# Patient Record
Sex: MALE | Race: WHITE | Employment: OTHER | URBAN - METROPOLITAN AREA
[De-identification: names, ages, dates, MRNs, and addresses within clinical notes are randomized per-mention and may not be internally consistent; named-entity substitution may affect disease eponyms.]

---

## 2017-02-06 PROBLEM — I10 ESSENTIAL HYPERTENSION: Status: ACTIVE | Noted: 2017-02-06

## 2017-06-06 PROBLEM — E78.01 FAMILIAL HYPERCHOLESTEROLEMIA: Status: ACTIVE | Noted: 2017-06-06

## 2017-09-21 PROBLEM — E78.01 FAMILIAL HYPERCHOLESTEROLEMIA: Status: RESOLVED | Noted: 2017-06-06 | Resolved: 2017-09-21

## 2018-05-26 ENCOUNTER — APPOINTMENT (OUTPATIENT)
Dept: GENERAL RADIOLOGY | Age: 78
End: 2018-05-26
Attending: EMERGENCY MEDICINE
Payer: MEDICARE

## 2018-05-26 ENCOUNTER — HOSPITAL ENCOUNTER (EMERGENCY)
Age: 78
Discharge: HOME | End: 2018-05-27
Attending: EMERGENCY MEDICINE
Payer: MEDICARE

## 2018-05-26 VITALS
RESPIRATION RATE: 22 BRPM | OXYGEN SATURATION: 92 % | WEIGHT: 270 LBS | SYSTOLIC BLOOD PRESSURE: 158 MMHG | HEIGHT: 72 IN | HEART RATE: 93 BPM | DIASTOLIC BLOOD PRESSURE: 73 MMHG | TEMPERATURE: 99 F | BODY MASS INDEX: 36.57 KG/M2

## 2018-05-26 DIAGNOSIS — S80.12XA TRAUMATIC HEMATOMA OF LEFT LOWER LEG, INITIAL ENCOUNTER: ICD-10-CM

## 2018-05-26 DIAGNOSIS — L03.116 CELLULITIS OF LEFT LOWER LEG: Primary | ICD-10-CM

## 2018-05-26 LAB
ALBUMIN SERPL-MCNC: 2.8 G/DL (ref 3.2–4.6)
ALBUMIN/GLOB SERPL: 0.6 {RATIO} (ref 1.2–3.5)
ALP SERPL-CCNC: 60 U/L (ref 50–136)
ALT SERPL-CCNC: 19 U/L (ref 12–65)
ANION GAP SERPL CALC-SCNC: 4 MMOL/L (ref 7–16)
AST SERPL-CCNC: 44 U/L (ref 15–37)
BASOPHILS # BLD: 0 K/UL (ref 0–0.2)
BASOPHILS NFR BLD: 0 % (ref 0–2)
BILIRUB SERPL-MCNC: 0.9 MG/DL (ref 0.2–1.1)
BUN SERPL-MCNC: 25 MG/DL (ref 8–23)
CALCIUM SERPL-MCNC: 8.2 MG/DL (ref 8.3–10.4)
CHLORIDE SERPL-SCNC: 105 MMOL/L (ref 98–107)
CO2 SERPL-SCNC: 29 MMOL/L (ref 21–32)
CREAT SERPL-MCNC: 0.92 MG/DL (ref 0.8–1.5)
DIFFERENTIAL METHOD BLD: ABNORMAL
EOSINOPHIL # BLD: 0.1 K/UL (ref 0–0.8)
EOSINOPHIL NFR BLD: 1 % (ref 0.5–7.8)
ERYTHROCYTE [DISTWIDTH] IN BLOOD BY AUTOMATED COUNT: 14.2 % (ref 11.9–14.6)
GLOBULIN SER CALC-MCNC: 4.4 G/DL (ref 2.3–3.5)
GLUCOSE SERPL-MCNC: 100 MG/DL (ref 65–100)
HCT VFR BLD AUTO: 36.4 % (ref 41.1–50.3)
HGB BLD-MCNC: 11.8 G/DL (ref 13.6–17.2)
IMM GRANULOCYTES # BLD: 0 K/UL (ref 0–0.5)
IMM GRANULOCYTES NFR BLD AUTO: 0 % (ref 0–5)
INR PPP: 4.2
LACTATE BLD-SCNC: 0.8 MMOL/L (ref 0.5–1.9)
LYMPHOCYTES # BLD: 1.3 K/UL (ref 0.5–4.6)
LYMPHOCYTES NFR BLD: 13 % (ref 13–44)
MCH RBC QN AUTO: 30.8 PG (ref 26.1–32.9)
MCHC RBC AUTO-ENTMCNC: 32.4 G/DL (ref 31.4–35)
MCV RBC AUTO: 95 FL (ref 79.6–97.8)
MONOCYTES # BLD: 1.5 K/UL (ref 0.1–1.3)
MONOCYTES NFR BLD: 15 % (ref 4–12)
NEUTS SEG # BLD: 7.2 K/UL (ref 1.7–8.2)
NEUTS SEG NFR BLD: 71 % (ref 43–78)
PLATELET # BLD AUTO: 195 K/UL (ref 150–450)
PMV BLD AUTO: 10.2 FL (ref 10.8–14.1)
POTASSIUM SERPL-SCNC: 5.2 MMOL/L (ref 3.5–5.1)
PROT SERPL-MCNC: 7.2 G/DL (ref 6.3–8.2)
PROTHROMBIN TIME: 40.4 SEC (ref 11.5–14.5)
RBC # BLD AUTO: 3.83 M/UL (ref 4.23–5.67)
SODIUM SERPL-SCNC: 138 MMOL/L (ref 136–145)
WBC # BLD AUTO: 10.2 K/UL (ref 4.3–11.1)

## 2018-05-26 PROCEDURE — 83605 ASSAY OF LACTIC ACID: CPT

## 2018-05-26 PROCEDURE — 85610 PROTHROMBIN TIME: CPT | Performed by: EMERGENCY MEDICINE

## 2018-05-26 PROCEDURE — 85025 COMPLETE CBC W/AUTO DIFF WBC: CPT | Performed by: EMERGENCY MEDICINE

## 2018-05-26 PROCEDURE — 80053 COMPREHEN METABOLIC PANEL: CPT | Performed by: EMERGENCY MEDICINE

## 2018-05-26 PROCEDURE — 74011250637 HC RX REV CODE- 250/637: Performed by: EMERGENCY MEDICINE

## 2018-05-26 PROCEDURE — 96375 TX/PRO/DX INJ NEW DRUG ADDON: CPT | Performed by: EMERGENCY MEDICINE

## 2018-05-26 PROCEDURE — 96374 THER/PROPH/DIAG INJ IV PUSH: CPT | Performed by: EMERGENCY MEDICINE

## 2018-05-26 PROCEDURE — 73590 X-RAY EXAM OF LOWER LEG: CPT

## 2018-05-26 PROCEDURE — 99284 EMERGENCY DEPT VISIT MOD MDM: CPT | Performed by: EMERGENCY MEDICINE

## 2018-05-26 PROCEDURE — 74011250636 HC RX REV CODE- 250/636: Performed by: EMERGENCY MEDICINE

## 2018-05-26 RX ORDER — CEPHALEXIN 500 MG/1
500 CAPSULE ORAL 4 TIMES DAILY
Qty: 28 CAP | Refills: 0 | Status: SHIPPED | OUTPATIENT
Start: 2018-05-26 | End: 2018-06-02

## 2018-05-26 RX ORDER — HYDROMORPHONE HYDROCHLORIDE 1 MG/ML
0.5 INJECTION, SOLUTION INTRAMUSCULAR; INTRAVENOUS; SUBCUTANEOUS
Status: DISCONTINUED | OUTPATIENT
Start: 2018-05-26 | End: 2018-05-26 | Stop reason: SDUPTHER

## 2018-05-26 RX ORDER — ONDANSETRON 2 MG/ML
4 INJECTION INTRAMUSCULAR; INTRAVENOUS
Status: COMPLETED | OUTPATIENT
Start: 2018-05-26 | End: 2018-05-26

## 2018-05-26 RX ORDER — CEFAZOLIN SODIUM/WATER 2 G/20 ML
2 SYRINGE (ML) INTRAVENOUS
Status: COMPLETED | OUTPATIENT
Start: 2018-05-26 | End: 2018-05-26

## 2018-05-26 RX ORDER — HYDROMORPHONE HYDROCHLORIDE 1 MG/ML
0.5 INJECTION, SOLUTION INTRAMUSCULAR; INTRAVENOUS; SUBCUTANEOUS
Status: COMPLETED | OUTPATIENT
Start: 2018-05-26 | End: 2018-05-26

## 2018-05-26 RX ORDER — HYDROMORPHONE HYDROCHLORIDE 1 MG/ML
1 INJECTION, SOLUTION INTRAMUSCULAR; INTRAVENOUS; SUBCUTANEOUS
Status: DISCONTINUED | OUTPATIENT
Start: 2018-05-26 | End: 2018-05-26

## 2018-05-26 RX ORDER — ACETAMINOPHEN 325 MG/1
650 TABLET ORAL
Status: COMPLETED | OUTPATIENT
Start: 2018-05-26 | End: 2018-05-26

## 2018-05-26 RX ADMIN — ACETAMINOPHEN 650 MG: 325 TABLET ORAL at 22:43

## 2018-05-26 RX ADMIN — ONDANSETRON 4 MG: 2 INJECTION INTRAMUSCULAR; INTRAVENOUS at 22:43

## 2018-05-26 RX ADMIN — Medication 2 G: at 22:43

## 2018-05-26 RX ADMIN — HYDROMORPHONE HYDROCHLORIDE 0.5 MG: 1 INJECTION, SOLUTION INTRAMUSCULAR; INTRAVENOUS; SUBCUTANEOUS at 22:43

## 2018-05-27 NOTE — DISCHARGE INSTRUCTIONS
Cellulitis: Care Instructions  Your Care Instructions    Cellulitis is a skin infection. It often occurs after a break in the skin from a scrape, cut, bite, or puncture, or after a rash. The doctor has checked you carefully, but problems can develop later. If you notice any problems or new symptoms, get medical treatment right away. Follow-up care is a key part of your treatment and safety. Be sure to make and go to all appointments, and call your doctor if you are having problems. It's also a good idea to know your test results and keep a list of the medicines you take. How can you care for yourself at home? · Take your antibiotics as directed. Do not stop taking them just because you feel better. You need to take the full course of antibiotics. · Prop up the infected area on pillows to reduce pain and swelling. Try to keep the area above the level of your heart as often as you can. · If your doctor told you how to care for your wound, follow your doctor's instructions. If you did not get instructions, follow this general advice:  ¨ Wash the wound with clean water 2 times a day. Don't use hydrogen peroxide or alcohol, which can slow healing. ¨ You may cover the wound with a thin layer of petroleum jelly, such as Vaseline, and a nonstick bandage. ¨ Apply more petroleum jelly and replace the bandage as needed. · Be safe with medicines. Take pain medicines exactly as directed. ¨ If the doctor gave you a prescription medicine for pain, take it as prescribed. ¨ If you are not taking a prescription pain medicine, ask your doctor if you can take an over-the-counter medicine. To prevent cellulitis in the future  · Try to prevent cuts, scrapes, or other injuries to your skin. Cellulitis most often occurs where there is a break in the skin. · If you get a scrape, cut, mild burn, or bite, wash the wound with clean water as soon as you can to help avoid infection.  Don't use hydrogen peroxide or alcohol, which can slow healing. · If you have swelling in your legs (edema), support stockings and good skin care may help prevent leg sores and cellulitis. · Take care of your feet, especially if you have diabetes or other conditions that increase the risk of infection. Wear shoes and socks. Do not go barefoot. If you have athlete's foot or other skin problems on your feet, talk to your doctor about how to treat them. When should you call for help? Call your doctor now or seek immediate medical care if:  ? · You have signs that your infection is getting worse, such as:  ¨ Increased pain, swelling, warmth, or redness. ¨ Red streaks leading from the area. ¨ Pus draining from the area. ¨ A fever. ? · You get a rash. ? Watch closely for changes in your health, and be sure to contact your doctor if:  ? · You are not getting better after 1 day (24 hours). ? · You do not get better as expected. Where can you learn more? Go to http://marlyn-socorro.info/. Miguel Danger in the search box to learn more about \"Cellulitis: Care Instructions. \"  Current as of: October 13, 2016  Content Version: 11.4  © 2838-8230 [employer]. Care instructions adapted under license by [employer] (which disclaims liability or warranty for this information). If you have questions about a medical condition or this instruction, always ask your healthcare professional. [place] [address] any warranty or liability for your use of this information. Bruises: Care Instructions  Your Care Instructions    Bruises occur when small blood vessels under the skin tear or rupture, most often from a twist, bump, or fall. Blood leaks into tissues under the skin and causes a black-and-blue spot that often turns colors, including purplish black, reddish blue, or yellowish green, as the bruise heals. Bruises hurt, but most are not serious and will go away on their own within 2 to 4 weeks.  Sometimes, gravity causes them to spread down the body. A leg bruise usually will take longer to heal than a bruise on the face or arms. Follow-up care is a key part of your treatment and safety. Be sure to make and go to all appointments, and call your doctor if you are having problems. It's also a good idea to know your test results and keep a list of the medicines you take. How can you care for yourself at home? · Take pain medicines exactly as directed. ¨ If the doctor gave you a prescription medicine for pain, take it as prescribed. ¨ If you are not taking a prescription pain medicine, ask your doctor if you can take an over-the-counter medicine. · Put ice or a cold pack on the area for 10 to 20 minutes at a time. Put a thin cloth between the ice and your skin. · If you can, prop up the bruised area on pillows as much as possible for the next few days. Try to keep the bruise above the level of your heart. When should you call for help? Call your doctor now or seek immediate medical care if:  ? · You have signs of infection, such as:  ¨ Increased pain, swelling, warmth, or redness. ¨ Red streaks leading from the bruise. ¨ Pus draining from the bruise. ¨ A fever. ? · You have a bruise on your leg and signs of a blood clot, such as:  ¨ Increasing redness and swelling along with warmth, tenderness, and pain in the bruised area. ¨ Pain in your calf, back of the knee, thigh, or groin. ¨ Redness and swelling in your leg or groin. ? · Your pain gets worse. ? Watch closely for changes in your health, and be sure to contact your doctor if:  ? · You do not get better as expected. Where can you learn more? Go to http://marlyn-socorro.info/. Enter [phone] in the search box to learn more about \"Bruises: Care Instructions. \"  Current as of: March 20, 2017  Content Version: 11.4  © 7648-4642 [employer].  Care instructions adapted under license by [employer] (which disclaims liability or warranty for this information). If you have questions about a medical condition or this instruction, always ask your healthcare professional. [place] [address] any warranty or liability for your use of this information.

## 2018-05-27 NOTE — ED TRIAGE NOTES
Pt c/o left lower leg pain, left lower leg is very red, swollen and hot to touch. There is a wound on his lower left leg that he has a bandage covering.

## 2018-05-27 NOTE — ED TRIAGE NOTES
[facility] brought pt in after fall on Sunday. Pt unable to perform basic ADL's at home without pain. Pt does have bruising on left leg and pt c/o pain to that leg. Pt lives alone and thought he could get by without coming to ER but as the days have gone on since fall pt has had increased pain with movement and standing so he called 911 for them to bring him to ER.

## 2018-05-27 NOTE — ED PROVIDER NOTES
Patient is a 77 y.o. male presenting with fall. The history is provided by the patient. Fall   Incident onset: 6 days ago on Sunday. The fall occurred while walking. He fell from a height of ground level. He landed on hard floor. Point of impact: left knee and lower leg. Pain location: left lower leg. The pain is moderate. He was ambulatory at the scene. There was no entrapment after the fall. There was no drug use involved in the accident. There was no alcohol use involved in the accident. Pertinent negatives include no fever, no numbness, no abdominal pain, no nausea, no vomiting, no hematuria, no headaches, no loss of consciousness and no tingling. The symptoms are aggravated by activity. He has tried nothing for the symptoms.         Past Medical History:   Diagnosis Date    AICD (automatic cardioverter/defibrillator) present 10/27/2015    Aortic stenosis, mild to moderate 10/27/2015    Cardiomyopathy, ischemic 8/17/2012    Carotid occlusion, bilateral 8/20/2012    Chronic back pain 2/12/2015    Chronic rhinitis 10/27/2015    Chronic systolic congestive heart failure ([address] 10/27/2015    NYHA class 2    Coronary artery disease 8/17/2012    DVT, lower extremity, recurrent ([address]     1994 and 2003 - on chronic coumadin    Factor 5 Leiden mutation, heterozygous ([address] 8/17/2012    Heart attack ([address]     \"I had a heart attack the day I was being discharged from my triple by-pass\"    History of alcoholism ([address] quit age 34    History of complete eye exam 10/2016    Hx of smoking quit after 19    Hyperlipidemia 8/17/2012    Hypertension 8/17/2012    NSVT (nonsustained ventricular tachycardia) ([address]     Obesity 8/17/2012    Osteoarthritis 2/12/2015    Pacemaker     Paroxysmal atrial fibrillation ([address] 10/27/2015    Phlebitis and thrombophlebitis 1995, 2004    due to Factor 5 Leiden    S/P total knee arthroplasty 2/26/2015    Tobacco abuse 2/12/2015    Wears dentures        Past Surgical History: Procedure Laterality Date    HX ANGIOPLASTY      rotoblator    HX CATARACT REMOVAL      iop    HX CORONARY ARTERY BYPASS GRAFT  x 3, 2011    in [address]  2011    +Defibrilator placement    HX LIPOMA RESECTION  1998    back    HX LYMPH NODE DISSECTION      Lymph node bx/removal from back    HX ORTHOPAEDIC Left     Knee surgery         Family History:   Problem Relation Age of Onset    Heart Disease Mother       CHF age 71    Other Father      blood clot  age 76    Cancer Sister 68     Pancreatic    Cancer Brother      Skin    Heart Disease Brother      Heart Valve       Social History     Social History    Marital status:      Spouse name: N/A    Number of children: N/A    Years of education: N/A     Occupational History    Not on file. Social History Main Topics    Smoking status: Former Smoker     Packs/day: 1.00     Years: 20.00     Quit date: 1972    Smokeless tobacco: Never Used    Alcohol use No      Comment: quit at the age 34    Drug use: No    Sexual activity: Not on file     Other Topics Concern    Not on file     Social History Narrative    . First wife  , remarried 10/99. Raised in [place]. 11th grade education. [address] [place] [address] Rd. Hobbies include fishing and travel. No children. ALLERGIES: Review of patient's allergies indicates no known allergies. Review of Systems   Constitutional: Negative for fever. Respiratory: Negative for shortness of breath. Cardiovascular: Negative for chest pain. Gastrointestinal: Negative for abdominal pain, nausea and vomiting. Genitourinary: Negative for dysuria, hematuria and urgency. Musculoskeletal: Negative for back pain and neck pain. Skin: Positive for color change and wound. Neurological: Negative for tingling, loss of consciousness, numbness and headaches.        Vitals:    18 2124   BP: (!) 168/117   Pulse: 95 Resp: 22   Temp: 99 °F (37.2 °C)   SpO2: 99%   Weight: 122.5 kg (270 lb)   Height: 6' (1.829 m)            Physical Exam   Constitutional: He appears well-developed and well-nourished. HENT:   Mouth/Throat: Oropharynx is clear and moist.   Eyes: Conjunctivae are normal.   Cardiovascular: Normal rate, regular rhythm and normal heart sounds. Pulmonary/Chest: Effort normal and breath sounds normal.   Abdominal: Soft. He exhibits no distension. There is no tenderness. Musculoskeletal:   Left lower leg has  Diffuse erythema and bruising with some palm sized area of hematoma formation. There is diffuse warmth. Swelling extends up into the left knee and distal thigh area. Left lower extremity is neurovascularly intact. 1+ bilateral dorsalis pedis and posterior tibial pulses are present. Motor and sensation are intact. No abscesses appreciated. Neurological: He is alert. Nursing note and vitals reviewed. MDM  Number of Diagnoses or Management Options  Diagnosis management comments: X-rays negative for fracture. There is no gas in the soft tissues. No evidence of osteomyelitis. INR is elevated. I will have patient hold his Coumadin tomorrow and Monday and follow-up at [facility] cardiology on Tuesday to have it rechecked and then have discussion of what dose to start back on. He usually takes 10 mg every day but Tuesday's on which she takes 5 mg. He does not have diabetes.   Doubt DVT given elevated INR but he does have a history of phlebitis and DVT in the past.       Amount and/or Complexity of Data Reviewed  Clinical lab tests: ordered and reviewed (Results for orders placed or performed during the hospital encounter of 05/26/18  -PROTHROMBIN TIME + INR       Result                                            Value                         Ref Range                       Prothrombin time                                  40.4 (H)                      11.5 - 14.5 sec                 INR 4.2 ()                                                 -CBC WITH AUTOMATED DIFF       Result                                            Value                         Ref Range                       WBC                                               10.2                          4.3 - 11.1 K/uL                 RBC                                               3.83 (L)                      4.23 - 5.67 M/uL                HGB                                               11.8 (L)                      13.6 - 17.2 g/dL                HCT                                               36.4 (L)                      41.1 - 50.3 %                   MCV                                               95.0                          79.6 - 97.8 FL                  MCH                                               30.8                          26.1 - 32.9 PG                  MCHC                                              32.4                          31.4 - 35.0 g/dL                RDW                                               14.2                          11.9 - 14.6 %                   PLATELET                                          195                           150 - 450 K/uL                  MPV                                               10.2 (L)                      10.8 - 14.1 FL                  DF                                                AUTOMATED                                                     NEUTROPHILS                                       71                            43 - 78 %                       LYMPHOCYTES                                       13                            13 - 44 %                       MONOCYTES                                         15 (H)                        4.0 - 12.0 %                    EOSINOPHILS                                       1                             0.5 - 7.8 %                     BASOPHILS 0                             0.0 - 2.0 %                     IMMATURE GRANULOCYTES                             0                             0.0 - 5.0 %                     ABS. NEUTROPHILS                                  7.2                           1.7 - 8.2 K/UL                  ABS. LYMPHOCYTES                                  1.3                           0.5 - 4.6 K/UL                  ABS. MONOCYTES                                    1.5 (H)                       0.1 - 1.3 K/UL                  ABS. EOSINOPHILS                                  0.1                           0.0 - 0.8 K/UL                  ABS. BASOPHILS                                    0.0                           0.0 - 0.2 K/UL                  ABS. IMM.  GRANS.                                  0.0                           0.0 - 0.5 K/UL             -METABOLIC PANEL, COMPREHENSIVE       Result                                            Value                         Ref Range                       Sodium                                            138                           136 - 145 mmol/L                Potassium                                         5.2 (H)                       3.5 - 5.1 mmol/L                Chloride                                          105                           98 - 107 mmol/L                 CO2                                               29                            21 - 32 mmol/L                  Anion gap                                         4 (L)                         7 - 16 mmol/L                   Glucose                                           100                           65 - 100 mg/dL                  BUN                                               25 (H)                        8 - 23 MG/DL                    Creatinine                                        0.92                          0.8 - 1.5 MG/DL                 GFR est AA >60                           >60 ml/min/1.73m2               GFR est non-AA                                    >60                           >60 ml/min/1.73m2               Calcium                                           8.2 (L)                       8.3 - 10.4 MG/DL                Bilirubin, total                                  0.9                           0.2 - 1.1 MG/DL                 ALT (SGPT)                                        19                            12 - 65 U/L                     AST (SGOT)                                        44 (H)                        15 - 37 U/L                     Alk. phosphatase                                  60                            50 - 136 U/L                    Protein, total                                    7.2                           6.3 - 8.2 g/dL                  Albumin                                           2.8 (L)                       3.2 - 4.6 g/dL                  Globulin                                          4.4 (H)                       2.3 - 3.5 g/dL                  A-G Ratio                                         0.6 (L)                       1.2 - 3.5                  -POC LACTIC ACID       Result                                            Value                         Ref Range                       Lactic Acid (POC)                                 0.8                           0.5 - 1.9 mmol/L           )  Tests in the radiology section of CPT®: ordered and reviewed (Xr Tib/fib Lt    Result Date: 5/26/2018  LEFT TIBIA-FIBULA AP AND LATERAL VIEWS HISTORY: Fall with pain swelling and bruising. FINDINGS: A left knee prosthesis is present. Edema is present throughout the soft tissues of the leg. There is no displaced tib-fib fracture. IMPRESSION: No displaced fracture.     )          ED Course       Procedures

## 2018-06-12 PROBLEM — E66.01 SEVERE OBESITY (BMI 35.0-39.9): Status: ACTIVE | Noted: 2018-06-12

## 2018-06-26 ENCOUNTER — HOSPITAL ENCOUNTER (OUTPATIENT)
Dept: WOUND CARE | Age: 78
Discharge: HOME | End: 2018-06-26
Attending: SURGERY
Payer: MEDICARE

## 2018-06-26 PROCEDURE — 87186 SC STD MICRODIL/AGAR DIL: CPT | Performed by: FAMILY MEDICINE

## 2018-06-26 PROCEDURE — 11045 DBRDMT SUBQ TISS EACH ADDL: CPT

## 2018-06-26 PROCEDURE — 99214 OFFICE O/P EST MOD 30 MIN: CPT

## 2018-06-26 PROCEDURE — 87075 CULTR BACTERIA EXCEPT BLOOD: CPT | Performed by: FAMILY MEDICINE

## 2018-06-26 PROCEDURE — 87077 CULTURE AEROBIC IDENTIFY: CPT | Performed by: FAMILY MEDICINE

## 2018-06-26 PROCEDURE — 87205 SMEAR GRAM STAIN: CPT | Performed by: FAMILY MEDICINE

## 2018-06-26 PROCEDURE — 11042 DBRDMT SUBQ TIS 1ST 20SQCM/<: CPT

## 2018-06-27 ENCOUNTER — HOME HEALTH ADMISSION (OUTPATIENT)
Dept: HOME HEALTH SERVICES | Facility: HOME HEALTH | Age: 78
End: 2018-06-27

## 2018-06-29 ENCOUNTER — HOSPITAL ENCOUNTER (OUTPATIENT)
Dept: WOUND CARE | Age: 78
Discharge: HOME | End: 2018-06-29
Attending: SURGERY
Payer: MEDICARE

## 2018-06-29 PROCEDURE — 99214 OFFICE O/P EST MOD 30 MIN: CPT

## 2018-07-03 ENCOUNTER — APPOINTMENT (OUTPATIENT)
Dept: WOUND CARE | Age: 78
End: 2018-07-03
Attending: SURGERY
Payer: MEDICARE

## 2018-07-03 LAB
BACTERIA SPEC CULT: ABNORMAL
GRAM STN SPEC: ABNORMAL
GRAM STN SPEC: ABNORMAL
SERVICE CMNT-IMP: ABNORMAL

## 2018-07-04 LAB
BACTERIA SPEC CULT: NORMAL
SERVICE CMNT-IMP: NORMAL

## 2018-07-06 ENCOUNTER — HOSPITAL ENCOUNTER (OUTPATIENT)
Dept: WOUND CARE | Age: 78
Discharge: HOME | End: 2018-07-06
Attending: SURGERY
Payer: MEDICARE

## 2018-07-06 PROCEDURE — 97597 DBRDMT OPN WND 1ST 20 CM/<: CPT

## 2018-07-06 PROCEDURE — 97598 DBRDMT OPN WND ADDL 20CM/<: CPT

## 2018-07-11 ENCOUNTER — HOSPITAL ENCOUNTER (OUTPATIENT)
Dept: WOUND CARE | Age: 78
Discharge: HOME | End: 2018-07-11
Attending: SURGERY
Payer: MEDICARE

## 2018-07-11 PROCEDURE — 97605 NEG PRS WND THER DME<=50SQCM: CPT

## 2018-07-20 ENCOUNTER — HOSPITAL ENCOUNTER (OUTPATIENT)
Dept: WOUND CARE | Age: 78
Discharge: HOME | End: 2018-07-20
Attending: SURGERY
Payer: MEDICARE

## 2018-07-20 PROCEDURE — 29581 APPL MULTLAYER CMPRN SYS LEG: CPT

## 2018-08-03 ENCOUNTER — HOSPITAL ENCOUNTER (OUTPATIENT)
Dept: WOUND CARE | Age: 78
Discharge: HOME | End: 2018-08-03
Attending: SURGERY
Payer: MEDICARE

## 2018-08-03 PROCEDURE — 29581 APPL MULTLAYER CMPRN SYS LEG: CPT

## 2018-08-17 ENCOUNTER — HOSPITAL ENCOUNTER (OUTPATIENT)
Dept: WOUND CARE | Age: 78
Discharge: HOME | End: 2018-08-17
Attending: SURGERY
Payer: MEDICARE

## 2018-08-17 PROCEDURE — 29581 APPL MULTLAYER CMPRN SYS LEG: CPT

## 2018-08-31 ENCOUNTER — HOSPITAL ENCOUNTER (OUTPATIENT)
Dept: WOUND CARE | Age: 78
Discharge: HOME | End: 2018-08-31
Attending: SURGERY
Payer: MEDICARE

## 2018-08-31 VITALS
HEIGHT: 72 IN | SYSTOLIC BLOOD PRESSURE: 147 MMHG | OXYGEN SATURATION: 93 % | BODY MASS INDEX: 39.9 KG/M2 | DIASTOLIC BLOOD PRESSURE: 79 MMHG | TEMPERATURE: 97.6 F | WEIGHT: 294.6 LBS | HEART RATE: 82 BPM | RESPIRATION RATE: 24 BRPM

## 2018-08-31 PROCEDURE — 77030013575 HC DRSG HYDROFERA HOLL -B

## 2018-08-31 PROCEDURE — 77030019607 HC DSG BURN S&N -A

## 2018-08-31 PROCEDURE — A6021 COLLAGEN DRESSING <=16 SQ IN: HCPCS

## 2018-08-31 PROCEDURE — 29581 APPL MULTLAYER CMPRN SYS LEG: CPT

## 2018-08-31 NOTE — WOUND CARE
[address] [place] [address] [address] Phone: [phone] Fax: [phone] Patient: Ora Elizondo MRN: [id number]  SSN: [id number] YOB: 1940  Age: 77 y.o. Sex: male Return Appointment: 2 weeks with Mohamud Driscoll MD 
 
Instructions: Wound vac to be discontinued. Cleanse wound and periwound with wound cleanser or normal saline. Endoform-Cut to wound size and apply to wound bed. Hydrofera Blue: Cut to wound size, moisten with saline, and apply to wound bed. Place endoform to wound bed and cover with hydrofera blue. Secure with exudry and coban 2 to left leg. Home health to change 2 to 3x weekly Should you experience increased redness, swelling, pain, foul odor, size of wound(s), or have a temperature over 101 degrees please contact the [address] [place] [address] Road at [phone] or if after hours contact your primary care physician or go to the hospital emergency department. Signed By: Clara Short August 31, 2018

## 2018-08-31 NOTE — WOUND CARE
08/31/18 1404 Wound Leg Lower Date First Assessed/Time First Assessed: 08/31/18 0934   POA: Yes  Wound Type: Trauma  Location: Leg Lower  Wound Description (Optional): #1 DRESSING STATUS Clean, dry, and intact DRESSING TYPE (npwt, coban 2) Non-Pressure Injury Full thickness (subcut/muscle) Wound Length (cm) 3.4 cm Wound Width (cm) 4.5 cm Wound Depth (cm) 0.2 Wound Surface area (cm^2) 15.3 cm^2 Condition of Base Granulation;Epithelializing Condition of Edges Open Epithelialization (%) 10 Tissue Type Red Tissue Type Percent Red 90 Drainage Amount  Moderate Drainage Color Serosanguinous Wound Odor None Periwound Skin Condition Intact Cleansing and Cleansing Agents  Normal saline

## 2018-09-05 NOTE — PROGRESS NOTES
Wound Center Progress Note Patient: Cayla Trejo MRN: [id number]  SSN: [id number] YOB: 1940  Age: 77 y.o. Sex: male Subjective: Chief Complaint: 
Left knee wound History of Present Illness:    
Left leg contusion s/p fall Wound Caused By: Contusion, anticoagulation, lagos lesion Associated Signs and Symptoms: Initial drainage, pain Timing: constant wound since June 2 Quality: wound Severity: full thickness Modifying Factors: CHF, LE edema Current Wound Care: wound vac Past Medical History:  
Diagnosis Date  AICD (automatic cardioverter/defibrillator) present 10/27/2015  Aortic stenosis, mild to moderate 10/27/2015  Cardiomyopathy, ischemic 8/17/2012  Carotid occlusion, bilateral 8/20/2012  Chronic back pain 2/12/2015  Chronic rhinitis 10/27/2015  Chronic systolic congestive heart failure ([address] 10/27/2015 NYHA class 2  
 Coronary artery disease 8/17/2012  DVT, lower extremity, recurrent ([address] 1994 and 2003 - on chronic coumadin  Factor 5 Leiden mutation, heterozygous ([address] 8/17/2012  Heart attack ([address] \"I had a heart attack the day I was being discharged from my triple by-pass\"  History of alcoholism ([address] quit age 34  
 History of complete eye exam 10/2016  Hx of smoking quit after 19  Hyperlipidemia 8/17/2012  Hypertension 8/17/2012  NSVT (nonsustained ventricular tachycardia) (HCC)  Obesity 8/17/2012  Osteoarthritis 2/12/2015  Pacemaker  Paroxysmal atrial fibrillation ([address] 10/27/2015  Phlebitis and thrombophlebitis 1995, 2004  
 due to Factor 5 Leiden  S/P total knee arthroplasty 2/26/2015  Tobacco abuse 2/12/2015  Wears dentures Past Surgical History:  
Procedure Laterality Date [address]  HX CATARACT REMOVAL    
 iop  HX CORONARY ARTERY BYPASS GRAFT  x 3, 12/2011  
 in [place]  HX IMPLANTABLE CARDIOVERTER DEFIBRILLATOR  2011 +Defibrilator placement [address]  HX LYMPH NODE DISSECTION Lymph node bx/removal from back  HX ORTHOPAEDIC Left Knee surgery Family History Problem Relation Age of Onset  Heart Disease Mother   
   CHF age 71  
 Other Father   
  blood clot  age 76  Cancer Sister 68 Pancreatic  Cancer Brother Skin  Heart Disease Brother Heart Valve Social History Substance Use Topics  Smoking status: Former Smoker Packs/day: 1.00 Years: 20.00 Quit date: 1972  Smokeless tobacco: Never Used  Alcohol use No  
   Comment: quit at the age 34 Prior to Admission medications Medication Sig Start Date End Date Taking? Authorizing Provider HYDROcodone-acetaminophen (NORCO)  mg tablet Take one tablet by mouth every 4 to 6 hours prn for pain. 8/15/18   Myrna Duque MD  
warfarin (COUMADIN) 5 mg tablet TAKE 2 TABLET BY MOUTH MONDAY AND FRIDAY AND 1 1/2 TABLET BY MOUTH THE OTHER DAYS Patient taking differently: 10mg on Sun,MOn ,Wed,Thurs,Fri,SAt, 5mg on 18   Myrna Duqeu MD  
carvedilol (COREG) 25 mg tablet TAKE 1 TABLET BY MOUTH TWICE DAILY WITH MEALS 18   Myrna Duque MD  
lisinopril (PRINIVIL, ZESTRIL) 20 mg tablet TAKE 1 TABLET BY MOUTH TWICE DAILY FOR HIGH BLOOD PRESSURE 18   Myrna Duque MD  
furosemide (LASIX) 40 mg tablet TAKE 1 TABLET BY MOUTH DAILY 10/25/17   Myrna Duque MD  
amLODIPine (NORVASC) 10 mg tablet TAKE 1 TABLET BY MOUTH ONCE DAILY 17   Myrna Duque MD  
pravastatin (PRAVACHOL) 40 mg tablet TAKE 1 TABLET BY MOUTH EVERY EVENING 17   Myrna Duque MD  
guaiFENesin (MUCINEX) 1,200 mg tp12 ER tablet Take 1,200 mg by mouth two (2) times a day. Historical Provider MULTIVITS-MINERALS/FA/LYCOPENE (MEN'S DAILY PO) Take 1 Tab by mouth daily. Historical Provider aspirin 81 mg tablet Take 81 mg by mouth. TAKE AM OF SURGERY WITH SMALL SIP OF WATER    Historical Provider No Known Allergies Review of Systems: 
Pertinent items are noted in the History of Present Illness. Lab Results Component Value Date/Time Hemoglobin A1c 6.2 (H) 08/22/2017 10:14 AM  
  
 
Immunization History Administered Date(s) Administered  Influenza High Dose Vaccine PF 09/14/2016  Influenza Vaccine 10/01/2014  Influenza Vaccine (Quad) Mdck Pf 09/21/2017  Influenza Vaccine PF 09/29/2015  Influenza Vaccine Whole 11/03/2011  Pneumococcal Conjugate (PCV-13) 08/22/2017  Pneumococcal Vaccine (Pcv) 10/01/2005, 10/20/2010  TB Skin Test (PPD) Intradermal 02/26/2015 Body mass index is 39.95 kg/(m^2). Counseling regarding nutrition done: Yes Pateint counsled about risks of smoking and cessation Current medications: 
Current Outpatient Prescriptions Medication Sig Dispense Refill  
 HYDROcodone-acetaminophen (NORCO)  mg tablet Take one tablet by mouth every 4 to 6 hours prn for pain. 135 Tab 0  
 warfarin (COUMADIN) 5 mg tablet TAKE 2 TABLET BY MOUTH MONDAY AND FRIDAY AND 1 1/2 TABLET BY MOUTH THE OTHER DAYS (Patient taking differently: 10mg on Sun,MOn ,Wed,Thurs,Fri,SAt, 5mg on Tues) 180 Tab 3  carvedilol (COREG) 25 mg tablet TAKE 1 TABLET BY MOUTH TWICE DAILY WITH MEALS 180 Tab 3  
 lisinopril (PRINIVIL, ZESTRIL) 20 mg tablet TAKE 1 TABLET BY MOUTH TWICE DAILY FOR HIGH BLOOD PRESSURE 180 Tab 3  furosemide (LASIX) 40 mg tablet TAKE 1 TABLET BY MOUTH DAILY 90 Tab 3  
 amLODIPine (NORVASC) 10 mg tablet TAKE 1 TABLET BY MOUTH ONCE DAILY 90 Tab 0  pravastatin (PRAVACHOL) 40 mg tablet TAKE 1 TABLET BY MOUTH EVERY EVENING 90 Tab 3  
 guaiFENesin (MUCINEX) 1,200 mg tp12 ER tablet Take 1,200 mg by mouth two (2) times a day.  MULTIVITS-MINERALS/FA/LYCOPENE (MEN'S DAILY PO) Take 1 Tab by mouth daily.  aspirin 81 mg tablet Take 81 mg by mouth. TAKE AM OF SURGERY WITH SMALL SIP OF WATER Objective:  
 
Physical Exam:  
 
Visit Vitals  /79 (BP 1 Location: Left arm)  Pulse 82  Temp 97.6 °F (36.4 °C)  Resp 24  
 Ht 6' (1.829 m)  Wt 133.6 kg (294 lb 9.6 oz)  SpO2 93%  BMI 39.95 kg/m2 General: well developed, well nourished, pleasant , NAD. Hygiene good Psych: cooperative. Pleasant. No anxiety or depression. Normal mood and affect. Neuro: alert and oriented to person/place/situation. Otherwise nonfocal. 
Derm: Normal turgor for age, dry skin HEENT: Normocephalic, atraumatic Neck: Normal range of motion Chest: Good air entry bilaterally Cardio: Normal heart sounds Abdomen: Soft, nontender Lower extremities: color normal; temperature normal.  
Unstable gait. Ulcer Description: Wound Knee Left (Active) Number of days:1287 Wound Leg Lower (Active) DRESSING STATUS Clean, dry, and intact 8/31/2018  2:04 PM  
Non-Pressure Injury Full thickness (subcut/muscle) 8/31/2018  2:04 PM  
Wound Length (cm) 3.4 cm 8/31/2018  2:04 PM  
Wound Width (cm) 4.5 cm 8/31/2018  2:04 PM  
Wound Depth (cm) 0.2 8/31/2018  2:04 PM  
Wound Surface area (cm^2) 15.3 cm^2 8/31/2018  2:04 PM  
Condition of Base Granulation;Epithelializing 8/31/2018  2:04 PM  
Condition of Edges Open 8/31/2018  2:04 PM  
Epithelialization (%) 10 8/31/2018  2:04 PM  
Tissue Type Red 8/31/2018  2:04 PM  
Tissue Type Percent Red 90 8/31/2018  2:04 PM  
Drainage Amount  Moderate 8/31/2018  2:04 PM  
Drainage Color Serosanguinous 8/31/2018  2:04 PM  
Wound Odor None 8/31/2018  2:04 PM  
Periwound Skin Condition Intact 8/31/2018  2:04 PM  
Cleansing and Cleansing Agents  Normal saline 8/31/2018  2:04 PM  
Number of days:5 Data Review: No results found for this or any previous visit (from the past 24 hour(s)). Assessment:  
 
Good local wound care Edema management Nutrition optimization Good Diabetic control Plan:  
 
Filled in. Ready to d/c vac. Will start endoform and hydrafera. Signed By: Jean-Claude Lawson MD   
 September 5, 2018

## 2018-09-14 ENCOUNTER — HOSPITAL ENCOUNTER (OUTPATIENT)
Dept: WOUND CARE | Age: 78
Discharge: HOME | End: 2018-09-14
Attending: SURGERY
Payer: MEDICARE

## 2018-09-14 VITALS
RESPIRATION RATE: 24 BRPM | HEIGHT: 72 IN | DIASTOLIC BLOOD PRESSURE: 95 MMHG | TEMPERATURE: 98.5 F | WEIGHT: 294 LBS | OXYGEN SATURATION: 96 % | BODY MASS INDEX: 39.82 KG/M2 | HEART RATE: 85 BPM | SYSTOLIC BLOOD PRESSURE: 182 MMHG

## 2018-09-14 PROCEDURE — 29581 APPL MULTLAYER CMPRN SYS LEG: CPT

## 2018-09-14 NOTE — WOUND CARE
[address] [place] [address], [address] [address] Phone: [phone] Fax: [phone] Patient: Andie Elliott MRN: [id number]  SSN: [id number] YOB: 1940  Age: 77 y.o. Sex: male Return Appointment: 2 weeks with Alexys Pink MD 
 
Instructions: . Left lateral leg Cleanse wound and periwound with wound cleanser or normal saline. Hydrofera Ready: Cut to wound size, place in wound bed, shiny side out. Cover with exudry. Home health to change 3x weekly Coban 2 to left lower leg. Continue to wear compression stocking to right leg daily. Should you experience increased redness, swelling, pain, foul odor, size of wound(s), or have a temperature over 101 degrees please contact the [address] [place] [address] Road at [phone] or if after hours contact your primary care physician or go to the hospital emergency department. Signed By: Marlon Morel September 14, 2018

## 2018-09-14 NOTE — WOUND CARE
09/14/18 1415 Wound Leg Lower Date First Assessed/Time First Assessed: 08/31/18 0934   POA: Yes  Wound Type: Trauma  Location: Leg Lower  Wound Description (Optional): #1 DRESSING STATUS Other (comment) (compression dressing slid down) DRESSING TYPE (hydrofera blue, gauze, kerlex, coban) Non-Pressure Injury Full thickness (subcut/muscle) Wound Length (cm) 2.7 cm Wound Width (cm) 4.5 cm Wound Depth (cm) 0.2 Wound Surface area (cm^2) 12.15 cm^2 Change in Wound Size % 20.59 Condition of Base Granulation;Epithelializing Epithelialization (%) 10 Tissue Type Red Tissue Type Percent Red 90 Drainage Amount  Moderate Drainage Color Serous Wound Odor None Periwound Skin Condition Intact;Edematous Cleansing and Cleansing Agents  Normal saline

## 2018-09-28 ENCOUNTER — HOSPITAL ENCOUNTER (OUTPATIENT)
Dept: WOUND CARE | Age: 78
Discharge: HOME | End: 2018-09-28
Attending: SURGERY
Payer: MEDICARE

## 2018-09-28 VITALS
TEMPERATURE: 98.1 F | HEIGHT: 72 IN | WEIGHT: 294 LBS | BODY MASS INDEX: 39.82 KG/M2 | SYSTOLIC BLOOD PRESSURE: 160 MMHG | HEART RATE: 86 BPM | DIASTOLIC BLOOD PRESSURE: 78 MMHG

## 2018-09-28 PROCEDURE — 29581 APPL MULTLAYER CMPRN SYS LEG: CPT

## 2018-09-28 NOTE — WOUND CARE
09/28/18 1324 Wound Leg Lower Date First Assessed/Time First Assessed: 08/31/18 0934   POA: Yes  Wound Type: Trauma  Location: Leg Lower  Wound Description (Optional): #1 DRESSING STATUS Clean, dry, and intact DRESSING TYPE (Hydrofera Ready, ABD, Coban 2) Non-Pressure Injury Full thickness (subcut/muscle) Wound Length (cm) 3 cm Wound Width (cm) 3.8 cm Wound Depth (cm) 0.1 Wound Surface area (cm^2) 11.4 cm^2 Change in Wound Size % 25.49 Condition of Base Epithelializing;Granulation Condition of Edges Open Epithelialization (%) 10 Tissue Type Red Tissue Type Percent Red 100 Drainage Amount  Moderate Drainage Color Serous Wound Odor None Periwound Skin Condition Edematous; Intact Cleansing and Cleansing Agents  Normal saline; Soap and water

## 2018-09-28 NOTE — WOUND CARE
[address] [place] [address], [address] Phone: [phone] Fax: [phone] Patient: Satnam Mckinney MRN: [id number]  SSN: [id number] YOB: 1940  Age: 77 y.o. Sex: male Return Appointment: 2 weeks with Ramos Vu MD 
 
Instructions:  
Left lateral leg: 
Cleanse wound and periwound with wound cleanser or normal saline. Apply Hydrofera Ready to wound bed: Cut to wound size, place in wound bed, shiny side out. Cover with exudry. Coban 2 to left lower leg. Home health to change 3x weekly. Continue to wear compression stocking to right leg daily. Should you experience increased redness, swelling, pain, foul odor, size of wound(s), or have a temperature over 101 degrees please contact the [address] [place] [address] Road at [phone] or if after hours contact your primary care physician or go to the hospital emergency department. Signed By: Jessica Toscano RN September 28, 2018

## 2018-10-12 ENCOUNTER — HOSPITAL ENCOUNTER (OUTPATIENT)
Dept: WOUND CARE | Age: 78
Discharge: HOME | End: 2018-10-12
Attending: SURGERY
Payer: MEDICARE

## 2018-10-12 ENCOUNTER — HOME HEALTH ADMISSION (OUTPATIENT)
Dept: HOME HEALTH SERVICES | Facility: HOME HEALTH | Age: 78
End: 2018-10-12

## 2018-10-12 VITALS
RESPIRATION RATE: 20 BRPM | HEIGHT: 72 IN | OXYGEN SATURATION: 97 % | HEART RATE: 78 BPM | BODY MASS INDEX: 40.09 KG/M2 | DIASTOLIC BLOOD PRESSURE: 78 MMHG | WEIGHT: 296 LBS | TEMPERATURE: 98 F | SYSTOLIC BLOOD PRESSURE: 145 MMHG

## 2018-10-12 PROCEDURE — 29581 APPL MULTLAYER CMPRN SYS LEG: CPT

## 2018-10-12 NOTE — WOUND CARE
[address] [place] [address] [address] Phone: [phone] Fax: [phone] Patient: Randy Jon MRN: [id number]  SSN: [id number] YOB: 1940  Age: 77 y.o. Sex: male Return Appointment: 2 weeks with Tammi Manzo MD 
 
Instructions:  
Cleanse wound and periwound with wound cleanser or normal saline. Apply Hydrofera Ready to wound bed: Cut to wound size, place in wound bed, shiny side out. Cover with exudry. Coban 2 to left lower leg. Home health to change 2x weekly, Tuesdays & Fridays 
  
Continue to wear compression stocking to right leg daily. Should you experience increased redness, swelling, pain, foul odor, size of wound(s), or have a temperature over 101 degrees please contact the [address] [place] [address] Road at [phone] or if after hours contact your primary care physician or go to the hospital emergency department. Signed By: Sera Herrera RN October 12, 2018

## 2018-10-12 NOTE — DISCHARGE INSTRUCTIONS
Cleanse wound and periwound with wound cleanser or normal saline. Apply Hydrofera Ready to wound bed: Cut to wound size, place in wound bed, shiny side out. Cover with exudry. Coban 2 to left lower leg. Home health to change 2x weekly, Tuesdays & Fridays     Continue to wear compression stocking to right leg daily.

## 2018-10-12 NOTE — WOUND CARE
10/12/18 1420 Wound Leg Lower Date First Assessed/Time First Assessed: 08/31/18 0934   POA: Yes  Wound Type: Trauma  Location: Leg Lower  Wound Description (Optional): #1 DRESSING STATUS Clean, dry, and intact DRESSING TYPE (Hydrofera, ABD, Coban 2) Non-Pressure Injury Full thickness (subcut/muscle) Wound Length (cm) 2.5 cm Wound Width (cm) 2.5 cm Wound Depth (cm) 0.1 Wound Surface area (cm^2) 6.25 cm^2 Change in Wound Size % 59.15 Condition of Base Epithelializing;Granulation Condition of Edges Open Epithelialization (%) 10 Tissue Type Red Tissue Type Percent Red 100 Drainage Amount  Moderate Drainage Color Serous Wound Odor None Periwound Skin Condition Edematous Cleansing and Cleansing Agents  Soap and water

## 2018-10-16 ENCOUNTER — HOME CARE VISIT (OUTPATIENT)
Dept: SCHEDULING | Facility: HOME HEALTH | Age: 78
End: 2018-10-16

## 2018-10-26 ENCOUNTER — HOSPITAL ENCOUNTER (OUTPATIENT)
Dept: WOUND CARE | Age: 78
Discharge: HOME | End: 2018-10-26
Attending: SURGERY
Payer: MEDICARE

## 2018-10-26 VITALS
HEART RATE: 73 BPM | OXYGEN SATURATION: 93 % | WEIGHT: 303.4 LBS | DIASTOLIC BLOOD PRESSURE: 81 MMHG | BODY MASS INDEX: 41.09 KG/M2 | SYSTOLIC BLOOD PRESSURE: 151 MMHG | HEIGHT: 72 IN | RESPIRATION RATE: 20 BRPM | TEMPERATURE: 98.9 F

## 2018-10-26 PROCEDURE — 29581 APPL MULTLAYER CMPRN SYS LEG: CPT

## 2018-10-26 NOTE — WOUND CARE
10/26/18 1357 Wound Leg Lower Date First Assessed/Time First Assessed: 08/31/18 0934   POA: Yes  Wound Type: Trauma  Location: Leg Lower  Wound Description (Optional): #1 DRESSING STATUS Clean, dry, and intact DRESSING TYPE (hydrofera, abd, tape) Wound Length (cm) 1.8 cm Wound Width (cm) 3.5 cm Wound Depth (cm) 0.3 Wound Surface area (cm^2) 6.3 cm^2 Change in Wound Size % 58.82 Condition of Base Granulation Tissue Type Percent Red 100 Direction of Tunnels 7    oclock Depth of Tunnel/Sinus (cm) 1.3 cm Drainage Amount  Moderate Drainage Color Serous Wound Odor None Periwound Skin Condition Edematous Cleansing and Cleansing Agents  Normal saline

## 2018-10-26 NOTE — WOUND CARE
[address] [place] [address] [address] Phone: [phone] Fax: [phone] Patient: Satnam Mckinney MRN: [id number]  SSN: [id number] YOB: 1940  Age: 77 y.o. Sex: male Return Appointment: 3 weeks with Ramos Vu MD 
 
Instructions: Cleanse wound and periwound with wound cleanser or normal saline. Apply Hydrofera Ready to wound bed: Cut to wound size, place in wound bed, shiny side out. Cover with exudry. Coban 2 to left lower leg. Home health to change 2x weekly, Tuesdays & Fridays 
  
Continue to wear compression stocking to right leg daily. Should you experience increased redness, swelling, pain, foul odor, size of wound(s), or have a temperature over 101 degrees please contact the [address] [place] [address] Road at [phone] or if after hours contact your primary care physician or go to the hospital emergency department. Signed By: Radha Puckett RN October 26, 2018

## 2018-11-16 ENCOUNTER — HOSPITAL ENCOUNTER (OUTPATIENT)
Dept: WOUND CARE | Age: 78
Discharge: HOME | End: 2018-11-16
Attending: SURGERY
Payer: MEDICARE

## 2018-11-16 VITALS
BODY MASS INDEX: 41.04 KG/M2 | DIASTOLIC BLOOD PRESSURE: 85 MMHG | TEMPERATURE: 96.8 F | SYSTOLIC BLOOD PRESSURE: 148 MMHG | RESPIRATION RATE: 18 BRPM | OXYGEN SATURATION: 93 % | WEIGHT: 303 LBS | HEIGHT: 72 IN | HEART RATE: 88 BPM

## 2018-11-16 PROCEDURE — 99214 OFFICE O/P EST MOD 30 MIN: CPT

## 2018-11-16 NOTE — WOUND CARE
11/16/18 1347 Wound Leg Lower Date First Assessed/Time First Assessed: 08/31/18 0934   POA: Yes  Wound Type: Trauma  Location: Leg Lower  Wound Description (Optional): #1 DRESSING STATUS Clean, dry, and intact DRESSING TYPE (hydrofera ready, abd) Wound Length (cm) 1.4 cm Wound Width (cm) 1.4 cm Wound Depth (cm) 0.1 Wound Surface area (cm^2) 1.96 cm^2 Change in Wound Size % 87.19 Condition of Base Granulation Tissue Type Percent Red 100 Drainage Amount  Small Drainage Color Serosanguinous Wound Odor None Periwound Skin Condition Edematous Cleansing and Cleansing Agents  Normal saline

## 2018-11-16 NOTE — WOUND CARE
[address] [place] [address], [facility] [address] Phone: [phone] Fax: [phone] Patient: Ingrid Freitas MRN: [id number]  SSN: [id number] YOB: 1940  Age: 77 y.o. Sex: male Return Appointment: 2 weeks with Delfina Ragsdale MD 
 
Instructions: Clean with saline. Hydrofera Ready: Cut to wound size, place in wound bed, shiny side out. Cover with dry gauze and  covrsite. Change 3 times/week. Wear compression stocking to both lower legs daily. Should you experience increased redness, swelling, pain, foul odor, size of wound(s), or have a temperature over 101 degrees please contact the [address] [place] [address] Road at [phone] or if after hours contact your primary care physician or go to the hospital emergency department. Signed By: Yasmine Espinoza RN November 16, 2018

## 2018-11-30 ENCOUNTER — HOSPITAL ENCOUNTER (OUTPATIENT)
Dept: WOUND CARE | Age: 78
Discharge: HOME | End: 2018-11-30
Attending: SURGERY
Payer: MEDICARE

## 2018-11-30 VITALS
BODY MASS INDEX: 41.53 KG/M2 | HEIGHT: 72 IN | TEMPERATURE: 97.8 F | DIASTOLIC BLOOD PRESSURE: 93 MMHG | RESPIRATION RATE: 18 BRPM | SYSTOLIC BLOOD PRESSURE: 184 MMHG | OXYGEN SATURATION: 98 % | HEART RATE: 74 BPM | WEIGHT: 306.6 LBS

## 2018-11-30 PROCEDURE — 97597 DBRDMT OPN WND 1ST 20 CM/<: CPT

## 2018-11-30 NOTE — WOUND CARE
11/30/18 1359 Wound Leg Lower Left Date First Assessed/Time First Assessed: 08/31/18 0934   POA: Yes  Wound Type: Trauma  Location: Leg Lower  Wound Description (Optional): #1  Orientation: Left DRESSING STATUS Clean, dry, and intact DRESSING TYPE (hydrofera ready, covriste) Wound Length (cm) 0.5 cm Wound Width (cm) 0.7 cm Wound Depth (cm) 0.1 Wound Surface area (cm^2) 0.35 cm^2 Change in Wound Size % 97.71 Condition of Base Granulation Tissue Type Percent Red 100 Drainage Amount  Small Drainage Color Serosanguinous Wound Odor None Periwound Skin Condition Edematous Cleansing and Cleansing Agents  Normal saline Dressing Type Applied (hydrofera ready, covrsite)

## 2018-11-30 NOTE — PROCEDURES
Wound Center Debridement    Patient: Melinda Donahue MRN: [id number]  SSN: [id number]    YOB: 1940  Age: 77 y.o.   Sex: male      November 30, 2018     Procedure Performed By: Leny Bright MD    Wound: 1 Left  Trauma Other part of lower leg To Fat Layer    Type of Debridement:  Selective      Time Out Taken: Yes    Pain Control: N/A      Type of Tissue Removed: Biofilm    Frequency of Debridements: PRN    Consent in chart     Instrument: Blade     Bleeding: Minimal     Hemostasis: Pressure     Procedural Pain: 0    Post-Procedural Pain: 0    Pre-Debridement Measurements: 0.5 x 0.7 x 0.1 cm    Post-Debridement Measurements: 0.5 x 0.7 x 0.1 cm    Surface Area Debrided: 0.35 sq. cm    Response to Procedure: tolerated the procedure well with no complications

## 2018-11-30 NOTE — WOUND CARE
[address] [place] [address], [address] [address] Phone: [phone] Fax: [phone] Patient: Sony Swenson MRN: [id number]  SSN: [id number] YOB: 1940  Age: 77 y.o. Sex: male Return Appointment: 2 weeks with Francesca Mark MD 
 
Instructions: Clean with saline. Hydrofera Ready: Cut to wound size, place in wound bed, shiny side out. Cover with dry gauze and  covrsite. Change 3 times/week. Wear compression stocking to both lower legs daily. Should you experience increased redness, swelling, pain, foul odor, size of wound(s), or have a temperature over 101 degrees please contact the [address] [place] [address] Road at [phone] or if after hours contact your primary care physician or go to the hospital emergency department. Signed By: Samantha Mederos RN November 30, 2018

## 2018-12-14 ENCOUNTER — HOSPITAL ENCOUNTER (OUTPATIENT)
Dept: WOUND CARE | Age: 78
Discharge: HOME | End: 2018-12-14
Attending: SURGERY
Payer: MEDICARE

## 2018-12-14 VITALS
HEIGHT: 72 IN | RESPIRATION RATE: 18 BRPM | SYSTOLIC BLOOD PRESSURE: 172 MMHG | HEART RATE: 89 BPM | DIASTOLIC BLOOD PRESSURE: 81 MMHG | WEIGHT: 306 LBS | OXYGEN SATURATION: 92 % | TEMPERATURE: 98 F | BODY MASS INDEX: 41.45 KG/M2

## 2018-12-14 PROCEDURE — 99215 OFFICE O/P EST HI 40 MIN: CPT

## 2018-12-14 PROCEDURE — 77030035128 HC DRSG ANTIMIC FOAM HYDROFERA HOLL -A

## 2018-12-14 NOTE — WOUND CARE
[address]  [address], [address] [facility]  Phone: [phone]  Fax: [phone]    Patient: Mary Darby MRN: [id number]  SSN: [id number]    YOB: 1940  Age: 77 y.o. Sex: male       Return Appointment: 2 weeks with Erika Hernandez MD     Instructions: Clean with saline. Hydrofera Ready: Cut to wound size, place in wound bed, shiny side out. Cover with dry gauze and  covrsite. Change 3 times/week. Wear compression stocking to both lower legs daily.           Should you experience increased redness, swelling, pain, foul odor, size of wound(s), or have a temperature over 101 degrees please contact the [address] [place] [address] Road at [phone] or if after hours contact your primary care physician or go to the hospital emergency department.     Signed By: Mercedes Coronado RN     December 14, 2018

## 2018-12-14 NOTE — WOUND CARE
12/14/18 1401   Wound Leg Lower Left   Date First Assessed/Time First Assessed: 08/31/18 0934   POA: Yes  Wound Type: Trauma  Location: Leg Lower  Wound Description (Optional): #1  Orientation: Left   DRESSING STATUS Clean, dry, and intact   DRESSING TYPE (hydrofera ready, covRsite)   Wound Length (cm) 0.2 cm   Wound Width (cm) 0.2 cm   Wound Depth (cm) 0.4   Wound Surface area (cm^2) 0.04 cm^2   Change in Wound Size % 99.74   Condition of Base Granulation   Tissue Type Percent Red 100   Drainage Amount  Small    Drainage Color Serosanguinous   Wound Odor None   Periwound Skin Condition Edematous   Cleansing and Cleansing Agents  Normal saline

## 2018-12-28 ENCOUNTER — HOSPITAL ENCOUNTER (OUTPATIENT)
Dept: WOUND CARE | Age: 78
Discharge: HOME | End: 2018-12-28
Attending: SURGERY
Payer: MEDICARE

## 2018-12-28 VITALS
TEMPERATURE: 97.6 F | RESPIRATION RATE: 18 BRPM | DIASTOLIC BLOOD PRESSURE: 93 MMHG | HEART RATE: 75 BPM | SYSTOLIC BLOOD PRESSURE: 148 MMHG

## 2018-12-28 PROCEDURE — 99214 OFFICE O/P EST MOD 30 MIN: CPT

## 2018-12-28 NOTE — WOUND CARE
[address] [place] [address], [facility] [address] Phone: [phone] Fax: [phone] Patient: Kaitlynn Loera MRN: [id number]  SSN: [id number] YOB: 1940  Age: 77 y.o. Sex: male Return Appointment: if needed with Jonathan Harper MD 
 
Instructions: Left leg Cleanse wound and periwound with wound cleanser or normal saline. Protect new skin with border foam.  
Wear compression stockings daily and keep legs moisturized. Elevate legs to reduce swelling. Discharge from wound center. Follow up if needed. Should you experience increased redness, swelling, pain, foul odor, size of wound(s), or have a temperature over 101 degrees please contact the [address] [place] [address] Road at [phone] or if after hours contact your primary care physician or go to the hospital emergency department. Signed By: Buzz Haq December 28, 2018

## 2018-12-28 NOTE — WOUND CARE
12/28/18 1345 Wound Leg Lower Left Date First Assessed/Time First Assessed: 08/31/18 0934   POA: Yes  Wound Type: Trauma  Location: Leg Lower  Wound Description (Optional): #1  Orientation: Left Wound Length (cm) 0 cm Wound Width (cm) 0 cm Wound Depth (cm) 0 Wound Surface area (cm^2) 0 cm^2 Change in Wound Size % 100 Condition of Base Epithelializing Epithelialization (%) 100 Drainage Amount  None Wound Odor None Periwound Skin Condition Edematous Cleansing and Cleansing Agents  Normal saline

## 2019-01-10 ENCOUNTER — HOSPITAL ENCOUNTER (INPATIENT)
Age: 79
LOS: 11 days | Discharge: NURSING, REHAB OR LONG-TERM CARE | DRG: 193 | End: 2019-01-21
Attending: EMERGENCY MEDICINE | Admitting: FAMILY MEDICINE
Payer: MEDICARE

## 2019-01-10 ENCOUNTER — APPOINTMENT (OUTPATIENT)
Dept: GENERAL RADIOLOGY | Age: 79
DRG: 193 | End: 2019-01-10
Attending: EMERGENCY MEDICINE
Payer: MEDICARE

## 2019-01-10 ENCOUNTER — APPOINTMENT (OUTPATIENT)
Dept: CT IMAGING | Age: 79
DRG: 193 | End: 2019-01-10
Attending: FAMILY MEDICINE
Payer: MEDICARE

## 2019-01-10 DIAGNOSIS — J44.1 COPD EXACERBATION (HCC): ICD-10-CM

## 2019-01-10 DIAGNOSIS — D68.51 FACTOR 5 LEIDEN MUTATION, HETEROZYGOUS (HCC): Chronic | ICD-10-CM

## 2019-01-10 DIAGNOSIS — I48.0 PAROXYSMAL ATRIAL FIBRILLATION (HCC): ICD-10-CM

## 2019-01-10 DIAGNOSIS — J18.9 PNEUMONIA DUE TO INFECTIOUS ORGANISM, UNSPECIFIED LATERALITY, UNSPECIFIED PART OF LUNG: ICD-10-CM

## 2019-01-10 DIAGNOSIS — Z95.810 AICD (AUTOMATIC CARDIOVERTER/DEFIBRILLATOR) PRESENT: ICD-10-CM

## 2019-01-10 DIAGNOSIS — R09.02 HYPOXIA: ICD-10-CM

## 2019-01-10 DIAGNOSIS — I25.5 CARDIOMYOPATHY, ISCHEMIC: Chronic | ICD-10-CM

## 2019-01-10 DIAGNOSIS — J96.01 ACUTE RESPIRATORY FAILURE WITH HYPOXIA AND HYPERCAPNIA (HCC): ICD-10-CM

## 2019-01-10 DIAGNOSIS — J18.9 COMMUNITY ACQUIRED PNEUMONIA OF LEFT LUNG, UNSPECIFIED PART OF LUNG: Primary | ICD-10-CM

## 2019-01-10 DIAGNOSIS — I50.22 CHRONIC SYSTOLIC CONGESTIVE HEART FAILURE (HCC): ICD-10-CM

## 2019-01-10 DIAGNOSIS — I35.0 AORTIC STENOSIS, MILD: ICD-10-CM

## 2019-01-10 DIAGNOSIS — J96.02 ACUTE RESPIRATORY FAILURE WITH HYPOXIA AND HYPERCAPNIA (HCC): ICD-10-CM

## 2019-01-10 DIAGNOSIS — E66.01 CLASS 3 SEVERE OBESITY DUE TO EXCESS CALORIES WITH SERIOUS COMORBIDITY IN ADULT, UNSPECIFIED BMI (HCC): Chronic | ICD-10-CM

## 2019-01-10 DIAGNOSIS — I25.810 CORONARY ARTERY DISEASE INVOLVING AUTOLOGOUS VEIN CORONARY BYPASS GRAFT WITHOUT ANGINA PECTORIS: Chronic | ICD-10-CM

## 2019-01-10 DIAGNOSIS — E66.2 OBESITY HYPOVENTILATION SYNDROME (HCC): ICD-10-CM

## 2019-01-10 DIAGNOSIS — J96.02 ACUTE RESPIRATORY FAILURE WITH HYPERCAPNIA (HCC): ICD-10-CM

## 2019-01-10 PROBLEM — J96.92 RESPIRATORY FAILURE WITH HYPERCAPNIA (HCC): Status: ACTIVE | Noted: 2019-01-10

## 2019-01-10 PROBLEM — E87.5 HYPERKALEMIA: Status: ACTIVE | Noted: 2019-01-10

## 2019-01-10 LAB
ALBUMIN SERPL-MCNC: 3.1 G/DL (ref 3.2–4.6)
ALBUMIN/GLOB SERPL: 0.6 {RATIO}
ALP SERPL-CCNC: 72 U/L (ref 50–136)
ALT SERPL-CCNC: 20 U/L (ref 12–65)
ANION GAP SERPL CALC-SCNC: 1 MMOL/L
ARTERIAL PATENCY WRIST A: YES
AST SERPL-CCNC: 41 U/L (ref 15–37)
BASE EXCESS BLD CALC-SCNC: 6 MMOL/L
BASOPHILS # BLD: 0.1 K/UL (ref 0–0.2)
BASOPHILS NFR BLD: 1 % (ref 0–2)
BDY SITE: ABNORMAL
BILIRUB SERPL-MCNC: 0.5 MG/DL (ref 0.2–1.1)
BNP SERPL-MCNC: 98 PG/ML
BODY TEMPERATURE: 98.6
BUN SERPL-MCNC: 25 MG/DL (ref 8–23)
CALCIUM SERPL-MCNC: 8.7 MG/DL (ref 8.3–10.4)
CHLORIDE SERPL-SCNC: 91 MMOL/L (ref 98–107)
CO2 BLD-SCNC: 44 MMOL/L
CO2 SERPL-SCNC: 39 MMOL/L (ref 21–32)
COLLECT TIME,HTIME: 2215
CREAT SERPL-MCNC: 1.15 MG/DL (ref 0.8–1.5)
D DIMER PPP FEU-MCNC: 0.66 UG/ML(FEU)
DIFFERENTIAL METHOD BLD: ABNORMAL
EOSINOPHIL # BLD: 0.2 K/UL (ref 0–0.8)
EOSINOPHIL NFR BLD: 2 % (ref 0.5–7.8)
ERYTHROCYTE [DISTWIDTH] IN BLOOD BY AUTOMATED COUNT: 14.2 % (ref 11.9–14.6)
FLOW RATE ISTAT,IFRATE: 15 L/MIN
GAS FLOW.O2 O2 DELIVERY SYS: ABNORMAL L/MIN
GLOBULIN SER CALC-MCNC: 4.9 G/DL (ref 2.3–3.5)
GLUCOSE BLD STRIP.AUTO-MCNC: 185 MG/DL (ref 65–100)
GLUCOSE SERPL-MCNC: 93 MG/DL (ref 65–100)
HCO3 BLD-SCNC: 41 MMOL/L (ref 22–26)
HCT VFR BLD AUTO: 49.5 % (ref 41.1–50.3)
HGB BLD-MCNC: 15.2 G/DL (ref 13.6–17.2)
IMM GRANULOCYTES # BLD AUTO: 0 K/UL (ref 0–0.5)
IMM GRANULOCYTES NFR BLD AUTO: 0 % (ref 0–5)
INR PPP: 1.6
LACTATE BLD-SCNC: 0.94 MMOL/L (ref 0.5–1.9)
LYMPHOCYTES # BLD: 1.6 K/UL (ref 0.5–4.6)
LYMPHOCYTES NFR BLD: 18 % (ref 13–44)
MCH RBC QN AUTO: 30 PG (ref 26.1–32.9)
MCHC RBC AUTO-ENTMCNC: 30.7 G/DL (ref 31.4–35)
MCV RBC AUTO: 97.6 FL (ref 79.6–97.8)
MONOCYTES # BLD: 1 K/UL (ref 0.1–1.3)
MONOCYTES NFR BLD: 11 % (ref 4–12)
NEUTS SEG # BLD: 5.9 K/UL (ref 1.7–8.2)
NEUTS SEG NFR BLD: 67 % (ref 43–78)
NRBC # BLD: 0 K/UL (ref 0–0.2)
O2/TOTAL GAS SETTING VFR VENT: 100 %
PCO2 BLD: 113.9 MMHG (ref 35–45)
PH BLD: 7.16 [PH] (ref 7.35–7.45)
PLATELET # BLD AUTO: 164 K/UL (ref 150–450)
PMV BLD AUTO: 11.9 FL (ref 9.4–12.3)
PO2 BLD: 84 MMHG (ref 75–100)
POTASSIUM SERPL-SCNC: 5.2 MMOL/L (ref 3.5–5.1)
PROT SERPL-MCNC: 8 G/DL
PROTHROMBIN TIME: 19.4 SEC (ref 11.7–14.5)
RBC # BLD AUTO: 5.07 M/UL (ref 4.23–5.6)
SAO2 % BLD: 91 % (ref 95–98)
SERVICE CMNT-IMP: ABNORMAL
SERVICE CMNT-IMP: ABNORMAL
SODIUM SERPL-SCNC: 131 MMOL/L (ref 136–145)
SPECIMEN TYPE: ABNORMAL
WBC # BLD AUTO: 8.8 K/UL (ref 4.3–11.1)

## 2019-01-10 PROCEDURE — 74011250637 HC RX REV CODE- 250/637: Performed by: FAMILY MEDICINE

## 2019-01-10 PROCEDURE — 94640 AIRWAY INHALATION TREATMENT: CPT

## 2019-01-10 PROCEDURE — 99284 EMERGENCY DEPT VISIT MOD MDM: CPT | Performed by: EMERGENCY MEDICINE

## 2019-01-10 PROCEDURE — 85025 COMPLETE CBC W/AUTO DIFF WBC: CPT

## 2019-01-10 PROCEDURE — 36600 WITHDRAWAL OF ARTERIAL BLOOD: CPT

## 2019-01-10 PROCEDURE — 74011250636 HC RX REV CODE- 250/636: Performed by: FAMILY MEDICINE

## 2019-01-10 PROCEDURE — 74011250636 HC RX REV CODE- 250/636: Performed by: EMERGENCY MEDICINE

## 2019-01-10 PROCEDURE — 71045 X-RAY EXAM CHEST 1 VIEW: CPT

## 2019-01-10 PROCEDURE — 82962 GLUCOSE BLOOD TEST: CPT

## 2019-01-10 PROCEDURE — 80053 COMPREHEN METABOLIC PANEL: CPT

## 2019-01-10 PROCEDURE — 71260 CT THORAX DX C+: CPT

## 2019-01-10 PROCEDURE — 83605 ASSAY OF LACTIC ACID: CPT

## 2019-01-10 PROCEDURE — 65270000029 HC RM PRIVATE

## 2019-01-10 PROCEDURE — 74011000250 HC RX REV CODE- 250: Performed by: FAMILY MEDICINE

## 2019-01-10 PROCEDURE — 85610 PROTHROMBIN TIME: CPT

## 2019-01-10 PROCEDURE — 77030013032 HC MSK BPAP/CPAP FISP -B

## 2019-01-10 PROCEDURE — 74011000258 HC RX REV CODE- 258: Performed by: EMERGENCY MEDICINE

## 2019-01-10 PROCEDURE — 83880 ASSAY OF NATRIURETIC PEPTIDE: CPT

## 2019-01-10 PROCEDURE — 77030021668 HC NEB PREFIL KT VYRM -A

## 2019-01-10 PROCEDURE — 74011636320 HC RX REV CODE- 636/320: Performed by: EMERGENCY MEDICINE

## 2019-01-10 PROCEDURE — 85379 FIBRIN DEGRADATION QUANT: CPT

## 2019-01-10 PROCEDURE — 82803 BLOOD GASES ANY COMBINATION: CPT

## 2019-01-10 RX ORDER — ACETAMINOPHEN 325 MG/1
650 TABLET ORAL
Status: DISCONTINUED | OUTPATIENT
Start: 2019-01-10 | End: 2019-01-21 | Stop reason: HOSPADM

## 2019-01-10 RX ORDER — NALOXONE HYDROCHLORIDE 0.4 MG/ML
0.4 INJECTION, SOLUTION INTRAMUSCULAR; INTRAVENOUS; SUBCUTANEOUS AS NEEDED
Status: DISCONTINUED | OUTPATIENT
Start: 2019-01-10 | End: 2019-01-21 | Stop reason: HOSPADM

## 2019-01-10 RX ORDER — SODIUM CHLORIDE 0.9 % (FLUSH) 0.9 %
5-40 SYRINGE (ML) INJECTION EVERY 8 HOURS
Status: DISCONTINUED | OUTPATIENT
Start: 2019-01-10 | End: 2019-01-12

## 2019-01-10 RX ORDER — BISACODYL 5 MG
5 TABLET, DELAYED RELEASE (ENTERIC COATED) ORAL DAILY PRN
Status: DISCONTINUED | OUTPATIENT
Start: 2019-01-10 | End: 2019-01-11

## 2019-01-10 RX ORDER — ONDANSETRON 2 MG/ML
4 INJECTION INTRAMUSCULAR; INTRAVENOUS
Status: DISCONTINUED | OUTPATIENT
Start: 2019-01-10 | End: 2019-01-21 | Stop reason: HOSPADM

## 2019-01-10 RX ORDER — LORATADINE 10 MG/1
10 TABLET ORAL DAILY
Status: DISCONTINUED | OUTPATIENT
Start: 2019-01-10 | End: 2019-01-21 | Stop reason: HOSPADM

## 2019-01-10 RX ORDER — ASPIRIN 81 MG/1
81 TABLET ORAL DAILY
Status: DISCONTINUED | OUTPATIENT
Start: 2019-01-11 | End: 2019-01-21 | Stop reason: HOSPADM

## 2019-01-10 RX ORDER — CARVEDILOL 25 MG/1
25 TABLET ORAL 2 TIMES DAILY WITH MEALS
Status: DISCONTINUED | OUTPATIENT
Start: 2019-01-10 | End: 2019-01-21 | Stop reason: HOSPADM

## 2019-01-10 RX ORDER — AMLODIPINE BESYLATE 10 MG/1
10 TABLET ORAL DAILY
Status: DISCONTINUED | OUTPATIENT
Start: 2019-01-11 | End: 2019-01-21 | Stop reason: HOSPADM

## 2019-01-10 RX ORDER — PRAVASTATIN SODIUM 20 MG/1
40 TABLET ORAL
Status: DISCONTINUED | OUTPATIENT
Start: 2019-01-10 | End: 2019-01-21 | Stop reason: HOSPADM

## 2019-01-10 RX ORDER — HYDRALAZINE HYDROCHLORIDE 20 MG/ML
10 INJECTION INTRAMUSCULAR; INTRAVENOUS
Status: DISCONTINUED | OUTPATIENT
Start: 2019-01-10 | End: 2019-01-21 | Stop reason: HOSPADM

## 2019-01-10 RX ORDER — GUAIFENESIN 600 MG/1
1200 TABLET, EXTENDED RELEASE ORAL 2 TIMES DAILY
Status: DISCONTINUED | OUTPATIENT
Start: 2019-01-10 | End: 2019-01-21 | Stop reason: HOSPADM

## 2019-01-10 RX ORDER — FUROSEMIDE 10 MG/ML
40 INJECTION INTRAMUSCULAR; INTRAVENOUS DAILY
Status: DISCONTINUED | OUTPATIENT
Start: 2019-01-10 | End: 2019-01-13

## 2019-01-10 RX ORDER — LISINOPRIL 20 MG/1
20 TABLET ORAL DAILY
Status: DISCONTINUED | OUTPATIENT
Start: 2019-01-11 | End: 2019-01-21 | Stop reason: HOSPADM

## 2019-01-10 RX ORDER — MORPHINE SULFATE 2 MG/ML
1 INJECTION, SOLUTION INTRAMUSCULAR; INTRAVENOUS
Status: DISCONTINUED | OUTPATIENT
Start: 2019-01-10 | End: 2019-01-21 | Stop reason: HOSPADM

## 2019-01-10 RX ORDER — SODIUM CHLORIDE 0.9 % (FLUSH) 0.9 %
5-40 SYRINGE (ML) INJECTION AS NEEDED
Status: DISCONTINUED | OUTPATIENT
Start: 2019-01-10 | End: 2019-01-16

## 2019-01-10 RX ORDER — SODIUM CHLORIDE 0.9 % (FLUSH) 0.9 %
5-40 SYRINGE (ML) INJECTION AS NEEDED
Status: DISCONTINUED | OUTPATIENT
Start: 2019-01-10 | End: 2019-01-21 | Stop reason: HOSPADM

## 2019-01-10 RX ORDER — SODIUM CHLORIDE 0.9 % (FLUSH) 0.9 %
10 SYRINGE (ML) INJECTION
Status: COMPLETED | OUTPATIENT
Start: 2019-01-10 | End: 2019-01-10

## 2019-01-10 RX ORDER — HYDROCODONE BITARTRATE AND ACETAMINOPHEN 10; 325 MG/1; MG/1
1 TABLET ORAL
Status: DISCONTINUED | OUTPATIENT
Start: 2019-01-10 | End: 2019-01-21 | Stop reason: HOSPADM

## 2019-01-10 RX ORDER — WARFARIN SODIUM 5 MG/1
5 TABLET ORAL EVERY EVENING
Status: DISCONTINUED | OUTPATIENT
Start: 2019-01-10 | End: 2019-01-14

## 2019-01-10 RX ORDER — IPRATROPIUM BROMIDE AND ALBUTEROL SULFATE 2.5; .5 MG/3ML; MG/3ML
3 SOLUTION RESPIRATORY (INHALATION)
Status: DISCONTINUED | OUTPATIENT
Start: 2019-01-10 | End: 2019-01-12

## 2019-01-10 RX ADMIN — CARVEDILOL 25 MG: 25 TABLET, FILM COATED ORAL at 18:58

## 2019-01-10 RX ADMIN — CEFTRIAXONE 1 G: 1 INJECTION, POWDER, FOR SOLUTION INTRAMUSCULAR; INTRAVENOUS at 19:04

## 2019-01-10 RX ADMIN — AZITHROMYCIN MONOHYDRATE 500 MG: 500 INJECTION, POWDER, LYOPHILIZED, FOR SOLUTION INTRAVENOUS at 19:50

## 2019-01-10 RX ADMIN — Medication 25 ML: at 19:07

## 2019-01-10 RX ADMIN — METHYLPREDNISOLONE SODIUM SUCCINATE 40 MG: 40 INJECTION, POWDER, FOR SOLUTION INTRAMUSCULAR; INTRAVENOUS at 18:56

## 2019-01-10 RX ADMIN — SODIUM CHLORIDE 100 ML: 900 INJECTION, SOLUTION INTRAVENOUS at 18:18

## 2019-01-10 RX ADMIN — FUROSEMIDE 40 MG: 10 INJECTION, SOLUTION INTRAMUSCULAR; INTRAVENOUS at 18:56

## 2019-01-10 RX ADMIN — LORATADINE 10 MG: 10 TABLET ORAL at 18:40

## 2019-01-10 RX ADMIN — Medication 10 ML: at 18:18

## 2019-01-10 RX ADMIN — IPRATROPIUM BROMIDE AND ALBUTEROL SULFATE 3 ML: .5; 3 SOLUTION RESPIRATORY (INHALATION) at 19:35

## 2019-01-10 RX ADMIN — IOPAMIDOL 100 ML: 755 INJECTION, SOLUTION INTRAVENOUS at 18:19

## 2019-01-10 NOTE — H&P
Hospitalist H&P Note Admit Date:  1/10/2019  4:02 PM  
Name:  Franchesca Wells Age:  77 y.o. 
:  1940 MRN:  [id number] PCP:  Al Genao MD 
Treatment Team: Attending Provider: Sancho Romero MD; Primary Nurse: Ranjeet Vargas RN Sob,hypoxia,pna HPI:  
19-year-old male history of coronary artery disease, ischemic cardipmyopathy, hypertension, factor V Leiden, CABG, AICD for nonsustained V. tach, phlebitis, DVT, paroxysmal atrial fibrillation, aortic stenosis presents from his primary care office for pneumonia. Pt does have chronic sob on exertion since cabg- several years ago. Since past 2 weeks- nasal congestion,increased sob-- no fever or headache or dizziness or chest pain. Went to pcp today for his INR check- daughter complained that pt was looking ill- wanted pcp to evaluate- found to be hypoxic - oxygen sat 83%- was given breathing treatment- was told that has pneumonia- sent to er for further evaluation. In er found to have oxygen saturation 89- cxr- cardiomegaly with infiltrate- started in rocephin and zithromax. Mild elevated d-dimer- ordered ct chest with contrast - pending. Pt will be admitted for acute on chronic sob-secondary to pneumonia. 10 systems reviewed and negative except as noted in HPI. Past Medical History:  
Diagnosis Date  AICD (automatic cardioverter/defibrillator) present 10/27/2015  Aortic stenosis, mild to moderate 10/27/2015  Cardiomyopathy, ischemic 2012  Carotid occlusion, bilateral 2012  Chronic back pain 2015  Chronic rhinitis 10/27/2015  Chronic systolic congestive heart failure ([address] 10/27/2015 NYHA class 2  
 Coronary artery disease 2012  DVT, lower extremity, recurrent ([address]  and  - on chronic coumadin  Factor 5 Leiden mutation, heterozygous ([address] 2012  Heart attack ([address] \"I had a heart attack the day I was being discharged from my triple by-pass\"  History of alcoholism ([facility] quit age 34  
 History of complete eye exam 10/2016  Hx of smoking quit after 19  Hyperlipidemia 2012  Hypertension 2012  NSVT [facility]nonsustained ventricular tachycardia) [facility]  Obesity 2012  Osteoarthritis 2015  Pacemaker  Paroxysmal atrial fibrillation ([facility] 10/27/2015  Phlebitis and thrombophlebitis ,   
 due to Factor 5 Leiden  S/P total knee arthroplasty 2015  Tobacco abuse 2015  Wears dentures Past Surgical History:  
Procedure Laterality Date Ashish [address]  HX CATARACT REMOVAL    
 iop  HX CORONARY ARTERY BYPASS GRAFT  x 3, 2011  
 in [place]  HX IMPLANTABLE CARDIOVERTER DEFIBRILLATOR  2011 +Defibrilator placement [facility] [place]  HX LYMPH NODE DISSECTION Lymph node bx/removal from back  HX ORTHOPAEDIC Left Knee surgery No Known Allergies Social History Tobacco Use  Smoking status: Former Smoker Packs/day: 1.00 Years: 20.00 Pack years: 20.00 Last attempt to quit: 1972 Years since quittin.0  Smokeless tobacco: Never Used Substance Use Topics  Alcohol use: No  
  Comment: quit at the age 34 Family History Problem Relation Age of Onset  Heart Disease Mother   
      CHF age 71  
 Other Father   
     blood clot  age 76  Cancer Sister 68 Pancreatic  Cancer Brother Skin  Heart Disease Brother Heart Valve Immunization History Administered Date(s) Administered  Influenza High Dose Vaccine PF 2016  Influenza Vaccine 10/01/2014  Influenza Vaccine (Quad) Mdck Pf 2017  Influenza Vaccine PF 2015  Influenza Vaccine Whole 2011  Pneumococcal Conjugate (PCV-13) 2017  Pneumococcal Vaccine (Pcv) 10/01/2005, 10/20/2010  TB Skin Test (PPD) Intradermal 2015 PTA Medications: Prior to Admission Medications Prescriptions Last Dose Informant Patient Reported? Taking? HYDROcodone-acetaminophen (NORCO)  mg tablet   No No  
Sig: Take one tablet by mouth every 4 to 6 hours prn for pain. MULTIVITS-MINERALS/FA/LYCOPENE (MEN'S DAILY PO)   Yes No  
Sig: Take 1 Tab by mouth daily. amLODIPine (NORVASC) 10 mg tablet   No No  
Sig: TAKE 1 TABLET BY MOUTH ONCE DAILY  
aspirin 81 mg tablet   Yes No  
Sig: Take 81 mg by mouth. TAKE AM OF SURGERY WITH SMALL SIP OF WATER  
carvedilol (COREG) 25 mg tablet   No No  
Sig: TAKE 1 TABLET BY MOUTH TWICE DAILY WITH MEALS  
furosemide (LASIX) 40 mg tablet   No No  
Sig: TAKE 1 TABLET BY MOUTH DAILY  
guaiFENesin (MUCINEX) 1,200 mg tp12 ER tablet   Yes No  
Sig: Take 1,200 mg by mouth two (2) times a day. lisinopril (PRINIVIL, ZESTRIL) 20 mg tablet   No No  
Sig: TAKE 1 TABLET BY MOUTH TWICE DAILY FOR HIGH BLOOD PRESSURE  
pravastatin (PRAVACHOL) 40 mg tablet   No No  
Sig: TAKE 1 TABLET BY MOUTH EVERY EVENING  
warfarin (COUMADIN) 5 mg tablet   No No  
Sig: TAKE 2 TABLET BY MOUTH MONDAY AND FRIDAY AND 1 1/2 TABLET BY MOUTH THE OTHER DAYS Patient taking differently: 10mg on Sun,MOn ,Wed,Thurs,Fri,SAt, 5mg on Tues Facility-Administered Medications: None Objective:  
 
Patient Vitals for the past 24 hrs: 
 Temp Pulse Resp BP SpO2  
01/10/19 1637    164/74 93 % 01/10/19 1604 97.6 °F (36.4 °C) 68 19 160/78 (!) 89 % Oxygen Therapy O2 Sat (%): 93 % (01/10/19 1637) Pulse via Oximetry: 66 beats per minute (01/10/19 1637) O2 Device: Room air (01/10/19 1604) No intake or output data in the 24 hours ending 01/10/19 1826 Physical Exam: 
General:    Well nourished. Alert. Mild resp distress Eyes:   Normal sclera. Extraocular movements intact. ENT:  Normocephalic, atraumatic. Moist mucous membranes CV:   RRR. No murmur, rub, or gallop. Lungs:  Coarse breath sounds- no wheezing Abdomen: Soft, nontender, nondistended. Bowel sounds normal. obese Extremities: Warm and dry. No cyanosis . mild pitting edema both legs-has stocking. Neurologic: CN II-XII grossly intact. Sensation intact. Skin:     No rashes or jaundice. Wound left lateral- lower 1/3rd region- chronic Psych:  Normal mood and affect. I reviewed the labs, imaging, EKGs, telemetry, and other studies done this admission. Data Review:  
Recent Results (from the past 24 hour(s)) AMB POC PT/INR Collection Time: 01/10/19  2:11 PM  
Result Value Ref Range VALID INTERNAL CONTROL POC Yes Prothrombin time (POC) 21.2 seconds INR POC 1.8   
CBC WITH AUTOMATED DIFF Collection Time: 01/10/19  5:11 PM  
Result Value Ref Range WBC 8.8 4.3 - 11.1 K/uL  
 RBC 5.07 4.23 - 5.6 M/uL  
 HGB 15.2 13.6 - 17.2 g/dL HCT 49.5 41.1 - 50.3 % MCV 97.6 79.6 - 97.8 FL  
 MCH 30.0 26.1 - 32.9 PG  
 MCHC 30.7 (L) 31.4 - 35.0 g/dL  
 RDW 14.2 11.9 - 14.6 % PLATELET 630 953 - 634 K/uL MPV 11.9 9.4 - 12.3 FL ABSOLUTE NRBC 0.00 0.0 - 0.2 K/uL  
 DF AUTOMATED NEUTROPHILS 67 43 - 78 % LYMPHOCYTES 18 13 - 44 % MONOCYTES 11 4.0 - 12.0 % EOSINOPHILS 2 0.5 - 7.8 % BASOPHILS 1 0.0 - 2.0 % IMMATURE GRANULOCYTES 0 0.0 - 5.0 %  
 ABS. NEUTROPHILS 5.9 1.7 - 8.2 K/UL  
 ABS. LYMPHOCYTES 1.6 0.5 - 4.6 K/UL  
 ABS. MONOCYTES 1.0 0.1 - 1.3 K/UL  
 ABS. EOSINOPHILS 0.2 0.0 - 0.8 K/UL  
 ABS. BASOPHILS 0.1 0.0 - 0.2 K/UL  
 ABS. IMM. GRANS. 0.0 0.0 - 0.5 K/UL METABOLIC PANEL, COMPREHENSIVE Collection Time: 01/10/19  5:11 PM  
Result Value Ref Range Sodium 131 (L) 136 - 145 mmol/L Potassium 5.2 (H) 3.5 - 5.1 mmol/L Chloride 91 (L) 98 - 107 mmol/L  
 CO2 39 (H) 21 - 32 mmol/L Anion gap 1 mmol/L Glucose 93 65 - 100 mg/dL BUN 25 (H) 8 - 23 MG/DL Creatinine 1.15 0.8 - 1.5 MG/DL  
 GFR est AA >60 >60 ml/min/1.73m2 GFR est non-AA >60 ml/min/1.73m2  Calcium 8.7 8.3 - 10.4 MG/DL  
 Bilirubin, total 0.5 0.2 - 1.1 MG/DL  
 ALT (SGPT) 20 12 - 65 U/L  
 AST (SGOT) 41 (H) 15 - 37 U/L Alk. phosphatase 72 50 - 136 U/L Protein, total 8.0 g/dL Albumin 3.1 (L) 3.2 - 4.6 g/dL Globulin 4.9 (H) 2.3 - 3.5 g/dL A-G Ratio 0.6 BNP Collection Time: 01/10/19  5:11 PM  
Result Value Ref Range BNP 98 pg/mL POC LACTIC ACID Collection Time: 01/10/19  5:16 PM  
Result Value Ref Range Lactic Acid (POC) 0.94 0.5 - 1.9 mmol/L  
D DIMER Collection Time: 01/10/19  5:23 PM  
Result Value Ref Range D DIMER 0.66 (HH) <0.56 ug/ml(FEU) PROTHROMBIN TIME + INR Collection Time: 01/10/19  5:23 PM  
Result Value Ref Range Prothrombin time 19.4 (H) 11.7 - 14.5 sec INR 1.6 All Micro Results None Other Studies: Xr Chest Sngl V Result Date: 1/10/2019 Chest single view 1/10/2019 CLINICAL HISTORY: Worsening shortness of breath and congestion for 2 weeks. FINDINGS: A request was made to only perform and interpret a lateral view of the chest. On this lateral view, there is a suggestion of a positive spine sign suggesting a basilar infiltrate which could reside on the left or right. No significant layering pleural effusion is seen. The cardiac silhouette does appear at least mildly enlarged. No clear pneumothorax is seen although this would be best appreciated on the frontal view. IMPRESSION: 1. Cardiomegaly and potential basilar infiltrate suggested on this lateral view. Amb Poc Xray Chest 2 Views AP done showing pacemaker in place, Marked cardiomegaly,  Possible infiltrate in JODY area, no obvious effusion Assessment and Plan:  
 
Hospital Problems as of 1/10/2019 Date Reviewed: 10/2/2018 Codes Class Noted - Resolved POA Hyperkalemia ICD-10-CM: E87.5 ICD-9-CM: 276.7  1/10/2019 - Present Unknown Hypoxia ICD-10-CM: R09.02 
ICD-9-CM: 799.02  1/10/2019 - Present Unknown PNA (pneumonia) ICD-10-CM: J18.9 ICD-9-CM: 840  1/10/2019 - Present Unknown PLAN: 
Acute on chronic sob secondary to pna- ct chest with contrast pending. Hypoxia- secondary to pna 
pna-rocpehin and zithromax Mild hyperkalemia H/o dvt- subtherapeutic inr- pharmacy to dose coumadin Cad with cabg Systolic chf - last echo 1298- ef 40-45% - BNP 98 Obesity Advanced life care discussed with patient-pt is full code. DVT ppx:  coumadin Anticipated DC needs:   
Code status:  Full Estimated LOS:  Greater than 2 midnights Risk:  high Signed: 
Kira García MD

## 2019-01-10 NOTE — ED PROVIDER NOTES
79-year-old male history of coronary artery disease, ischemic cardipmyopathy, hypertension, factor V Leiden, CABG, AICD for nonsustained V. tach, phlebitis, DVT, paroxysmal atrial fibrillation, aortic stenosis presents from his primary care office for pneumonia. He reports chronic shortness of breath with any exertion since CABG and defibrillator was placed several years ago. Symptoms worsened over the past 2 weeks. He reports mild cough and congestion. No fevers or chills. He has chronic edema that is not significantly changed. Just finished last wound care appointment for injury to his left leg. His sats were found to be low in the office today 89%. He went to the office for an INR check and was found to be low. He was given an albuterol neb in office today. The history is provided by the spouse and the patient. Shortness of Breath Associated symptoms include cough, wheezing and leg swelling. Pertinent negatives include no fever, no headaches, no chest pain, no vomiting, no abdominal pain and no rash. Past Medical History:  
Diagnosis Date  AICD (automatic cardioverter/defibrillator) present 10/27/2015  Aortic stenosis, mild to moderate 10/27/2015  Cardiomyopathy, ischemic 8/17/2012  Carotid occlusion, bilateral 8/20/2012  Chronic back pain 2/12/2015  Chronic rhinitis 10/27/2015  Chronic systolic congestive heart failure ([address] 10/27/2015 NYHA class 2  
 Coronary artery disease 8/17/2012  DVT, lower extremity, recurrent ([address] 1994 and 2003 - on chronic coumadin  Factor 5 Leiden mutation, heterozygous ([address] 8/17/2012  Heart attack ([address] \"I had a heart attack the day I was being discharged from my triple by-pass\"  History of alcoholism ([address] quit age 34  
 History of complete eye exam 10/2016  Hx of smoking quit after 19  Hyperlipidemia 8/17/2012  Hypertension 8/17/2012  NSVT (nonsustained ventricular tachycardia) ([facility])  Obesity 2012  Osteoarthritis 2015  Pacemaker  Paroxysmal atrial fibrillation ([address] 10/27/2015  Phlebitis and thrombophlebitis 2004  
 due to Factor 5 Leiden  S/P total knee arthroplasty 2015  Tobacco abuse 2015  Wears dentures Past Surgical History:  
Procedure Laterality Date Ashish [address]  HX CATARACT REMOVAL    
 iop  HX CORONARY ARTERY BYPASS GRAFT  x 3, 2011  
 in [place]  HX IMPLANTABLE CARDIOVERTER DEFIBRILLATOR  2011 +Defibrilator placement [id number]  HX LYMPH NODE DISSECTION Lymph node bx/removal from back  HX ORTHOPAEDIC Left Knee surgery Family History:  
Problem Relation Age of Onset  Heart Disease Mother   
      CHF age 71  
 Other Father   
     blood clot  age 76  Cancer Sister 68 Pancreatic  Cancer Brother Skin  Heart Disease Brother Heart Valve Social History Socioeconomic History  Marital status:  Spouse name: Not on file  Number of children: Not on file  Years of education: Not on file  Highest education level: Not on file Social Needs  Financial resource strain: Not on file  Food insecurity - worry: Not on file  Food insecurity - inability: Not on file  Transportation needs - medical: Not on file  Transportation needs - non-medical: Not on file Occupational History  Not on file Tobacco Use  Smoking status: Former Smoker Packs/day: 1.00 Years: 20.00 Pack years: 20.00 Last attempt to quit: 1972 Years since quittin.0  Smokeless tobacco: Never Used Substance and Sexual Activity  Alcohol use: No  
  Comment: quit at the age 34  Drug use: No  
 Sexual activity: Not on file Other Topics Concern  Not on file Social History Narrative . First wife  , remarried 10/99. Raised in [place].    grade education. [address] [place] [address] Rd. Hobbies include fishing and travel. No children. ALLERGIES: Patient has no known allergies. Review of Systems Constitutional: Positive for fatigue. Negative for chills and fever. HENT: Positive for congestion. Negative for hearing loss. Eyes: Negative for visual disturbance. Respiratory: Positive for cough, shortness of breath and wheezing. Cardiovascular: Positive for leg swelling. Negative for chest pain and palpitations. Gastrointestinal: Negative for abdominal pain, diarrhea, nausea and vomiting. Musculoskeletal: Positive for back pain. Skin: Negative for rash. Neurological: Negative for weakness and headaches. Psychiatric/Behavioral: Negative for confusion. Vitals:  
 01/10/19 1604 BP: 160/78 Pulse: 68 Resp: 19 Temp: 97.6 °F (36.4 °C) SpO2: (!) 89% Weight: 136.1 kg (300 lb) Physical Exam  
Constitutional: He appears well-developed and well-nourished. Obese HENT:  
Head: Normocephalic and atraumatic. Right Ear: External ear normal.  
Left Ear: External ear normal.  
Nose: Nose normal.  
Mouth/Throat: Oropharynx is clear and moist.  
Eyes: Conjunctivae are normal. Pupils are equal, round, and reactive to light. Neck: Normal range of motion. Neck supple. Cardiovascular: Regular rhythm, normal heart sounds and intact distal pulses. Pulmonary/Chest: Effort normal. No respiratory distress. He has decreased breath sounds. He has wheezes. He has rhonchi. Abdominal: Soft. Bowel sounds are normal. He exhibits no distension. There is no tenderness. Musculoskeletal: Normal range of motion. Right lower leg: He exhibits edema. Left lower leg: He exhibits edema. Neurological: He is alert. Skin: Skin is warm and dry. Psychiatric: Judgment normal.  
Nursing note and vitals reviewed. MDM Number of Diagnoses or Management Options Diagnosis management comments: Parts of this document were created using dragon voice recognition software. The chart has been reviewed but errors may still be present. sats 89%, not on home oxygen. AP chest xray in office shows \"possible JODY infiltrate. \" INR low with risk for PE. Will check labs and lateral chest xray. Placed on oxygen. 6:00 PM 
Normal age adjusted d dimer. Placed on abx. dw hospitalist for admission. Pt updated. Amount and/or Complexity of Data Reviewed Clinical lab tests: ordered and reviewed (Results for orders placed or performed during the hospital encounter of 23/54/96 
-METABOLIC PANEL, COMPREHENSIVE Result                      Value             Ref Range Sodium                      131 (L)           136 - 145 mm* Potassium                   5.2 (H)           3.5 - 5.1 mm* Chloride                    91 (L)            98 - 107 mmo* CO2                         39 (H)            21 - 32 mmol* Anion gap                   1                 mmol/L Glucose                     93                65 - 100 mg/* BUN                         25 (H)            8 - 23 MG/DL Creatinine                  1.15              0.8 - 1.5 MG* 
     GFR est AA                  >60               >60 ml/min/1* GFR est non-AA              >60               ml/min/1.73m2 Calcium                     8.7               8.3 - 10.4 M* Bilirubin, total            0.5               0.2 - 1.1 MG* ALT (SGPT)                  20                12 - 65 U/L   
     AST (SGOT)                  41 (H)            15 - 37 U/L Alk. phosphatase            72                50 - 136 U/L Protein, total              8.0               g/dL Albumin                     3.1 (L)           3.2 - 4.6 g/*      Globulin                    4.9 (H)           2.3 - 3.5 g/* 
 A-G Ratio                   0.6                             
-D DIMER Result                      Value             Ref Range D DIMER                     0.66 (HH)         <0.56 ug/ml(* 
-PROTHROMBIN TIME + INR Result                      Value             Ref Range Prothrombin time            19.4 (H)          11.7 - 14.5 * INR                         1.6 -POC LACTIC ACID Result                      Value             Ref Range Lactic Acid (POC)           0.94              0.5 - 1.9 mm* 
) Tests in the radiology section of CPT®: ordered and reviewed (Xr Chest Sngl V Result Date: 1/10/2019 Chest single view 1/10/2019 CLINICAL HISTORY: Worsening shortness of breath and congestion for 2 weeks. FINDINGS: A request was made to only perform and interpret a lateral view of the chest. On this lateral view, there is a suggestion of a positive spine sign suggesting a basilar infiltrate which could reside on the left or right. No significant layering pleural effusion is seen. The cardiac silhouette does appear at least mildly enlarged. No clear pneumothorax is seen although this would be best appreciated on the frontal view. IMPRESSION: 1. Cardiomegaly and potential basilar infiltrate suggested on this lateral view. Amb Poc Xray Chest 2 Views AP done showing pacemaker in place, Marked cardiomegaly,  Possible infiltrate in JODY area, no obvious effusion ) Tests in the medicine section of CPT®: ordered and reviewed Procedures

## 2019-01-10 NOTE — ED TRIAGE NOTES
Pt to er with daughter, Tara Evans was at doctor office and was told he has pneumonia and low O2 sat

## 2019-01-11 PROBLEM — J44.1 COPD EXACERBATION (HCC): Status: ACTIVE | Noted: 2019-01-11

## 2019-01-11 LAB
ANION GAP SERPL CALC-SCNC: 3 MMOL/L
ARTERIAL PATENCY WRIST A: YES
BASE EXCESS BLD CALC-SCNC: 12 MMOL/L
BASE EXCESS BLD CALC-SCNC: 7 MMOL/L
BASE EXCESS BLD CALC-SCNC: 9 MMOL/L
BASOPHILS # BLD: 0 K/UL (ref 0–0.2)
BASOPHILS NFR BLD: 0 % (ref 0–2)
BDY SITE: ABNORMAL
BODY TEMPERATURE: 98.6
BUN SERPL-MCNC: 27 MG/DL (ref 8–23)
CALCIUM SERPL-MCNC: 8.2 MG/DL (ref 8.3–10.4)
CHLORIDE SERPL-SCNC: 91 MMOL/L (ref 98–107)
CO2 BLD-SCNC: 41 MMOL/L
CO2 BLD-SCNC: 42 MMOL/L
CO2 BLD-SCNC: 44 MMOL/L
CO2 SERPL-SCNC: 39 MMOL/L (ref 21–32)
COLLECT TIME,HTIME: 20
COLLECT TIME,HTIME: 517
COLLECT TIME,HTIME: 955
CREAT SERPL-MCNC: 1.06 MG/DL (ref 0.8–1.5)
DIFFERENTIAL METHOD BLD: ABNORMAL
EOSINOPHIL # BLD: 0 K/UL (ref 0–0.8)
EOSINOPHIL NFR BLD: 0 % (ref 0.5–7.8)
ERYTHROCYTE [DISTWIDTH] IN BLOOD BY AUTOMATED COUNT: 13.9 % (ref 11.9–14.6)
EXHALED MINUTE VOLUME, VE: 14.8 L/MIN
EXHALED MINUTE VOLUME, VE: 20.5 L/MIN
EXHALED MINUTE VOLUME, VE: 9.5 L/MIN
GAS FLOW.O2 O2 DELIVERY SYS: ABNORMAL L/MIN
GAS FLOW.O2 SETTING OXYMISER: 14 BPM
GAS FLOW.O2 SETTING OXYMISER: 20 BPM
GAS FLOW.O2 SETTING OXYMISER: 20 BPM
GLUCOSE SERPL-MCNC: 147 MG/DL (ref 65–100)
HCO3 BLD-SCNC: 38.8 MMOL/L (ref 22–26)
HCO3 BLD-SCNC: 40.2 MMOL/L (ref 22–26)
HCO3 BLD-SCNC: 41.6 MMOL/L (ref 22–26)
HCT VFR BLD AUTO: 48.8 % (ref 41.1–50.3)
HGB BLD-MCNC: 14.5 G/DL (ref 13.6–17.2)
IMM GRANULOCYTES # BLD AUTO: 0 K/UL (ref 0–0.5)
IMM GRANULOCYTES NFR BLD AUTO: 0 % (ref 0–5)
INR PPP: 1.5
INSPIRATION.DURATION SETTING TIME VENT: 0.9 SEC
INSPIRATION.DURATION SETTING TIME VENT: 1 SEC
INSPIRATION.DURATION SETTING TIME VENT: 1 SEC
LYMPHOCYTES # BLD: 0.5 K/UL (ref 0.5–4.6)
LYMPHOCYTES NFR BLD: 6 % (ref 13–44)
MCH RBC QN AUTO: 29.7 PG (ref 26.1–32.9)
MCHC RBC AUTO-ENTMCNC: 29.7 G/DL (ref 31.4–35)
MCV RBC AUTO: 99.8 FL (ref 79.6–97.8)
MONOCYTES # BLD: 0.1 K/UL (ref 0.1–1.3)
MONOCYTES NFR BLD: 1 % (ref 4–12)
NEUTS SEG # BLD: 7.7 K/UL (ref 1.7–8.2)
NEUTS SEG NFR BLD: 92 % (ref 43–78)
NRBC # BLD: 0 K/UL (ref 0–0.2)
O2/TOTAL GAS SETTING VFR VENT: 40 %
O2/TOTAL GAS SETTING VFR VENT: 45 %
O2/TOTAL GAS SETTING VFR VENT: 50 %
PCO2 BLD: 67 MMHG (ref 35–45)
PCO2 BLD: 85.3 MMHG (ref 35–45)
PCO2 BLD: 93.9 MMHG (ref 35–45)
PEEP RESPIRATORY: 8 CMH2O
PEEP RESPIRATORY: 8 CMH2O
PH BLD: 7.25 [PH] (ref 7.35–7.45)
PH BLD: 7.26 [PH] (ref 7.35–7.45)
PH BLD: 7.39 [PH] (ref 7.35–7.45)
PIP ISTAT,IPIP: 17
PIP ISTAT,IPIP: 21
PIP ISTAT,IPIP: 25
PLATELET # BLD AUTO: 159 K/UL (ref 150–450)
PMV BLD AUTO: 10.1 FL (ref 9.4–12.3)
PO2 BLD: 57 MMHG (ref 75–100)
PO2 BLD: 68 MMHG (ref 75–100)
PO2 BLD: 72 MMHG (ref 75–100)
POTASSIUM SERPL-SCNC: 5 MMOL/L (ref 3.5–5.1)
PROTHROMBIN TIME: 18 SEC (ref 11.7–14.5)
RBC # BLD AUTO: 4.89 M/UL (ref 4.23–5.6)
SAO2 % BLD: 87 % (ref 95–98)
SAO2 % BLD: 89 % (ref 95–98)
SAO2 % BLD: 90 % (ref 95–98)
SERVICE CMNT-IMP: 1
SERVICE CMNT-IMP: 2
SERVICE CMNT-IMP: ABNORMAL
SODIUM SERPL-SCNC: 133 MMOL/L (ref 136–145)
SPECIMEN TYPE: ABNORMAL
VT SETTING VENT: 600 ML
WBC # BLD AUTO: 8.4 K/UL (ref 4.3–11.1)

## 2019-01-11 PROCEDURE — C1751 CATH, INF, PER/CENT/MIDLINE: HCPCS

## 2019-01-11 PROCEDURE — 85610 PROTHROMBIN TIME: CPT

## 2019-01-11 PROCEDURE — 74011250636 HC RX REV CODE- 250/636: Performed by: INTERNAL MEDICINE

## 2019-01-11 PROCEDURE — 85025 COMPLETE CBC W/AUTO DIFF WBC: CPT

## 2019-01-11 PROCEDURE — 82803 BLOOD GASES ANY COMBINATION: CPT

## 2019-01-11 PROCEDURE — 80048 BASIC METABOLIC PNL TOTAL CA: CPT

## 2019-01-11 PROCEDURE — 86580 TB INTRADERMAL TEST: CPT | Performed by: FAMILY MEDICINE

## 2019-01-11 PROCEDURE — 65610000001 HC ROOM ICU GENERAL

## 2019-01-11 PROCEDURE — 74011250636 HC RX REV CODE- 250/636

## 2019-01-11 PROCEDURE — 77010033711 HC HIGH FLOW OXYGEN

## 2019-01-11 PROCEDURE — 74011000302 HC RX REV CODE- 302: Performed by: FAMILY MEDICINE

## 2019-01-11 PROCEDURE — 99223 1ST HOSP IP/OBS HIGH 75: CPT | Performed by: INTERNAL MEDICINE

## 2019-01-11 PROCEDURE — 93005 ELECTROCARDIOGRAM TRACING: CPT | Performed by: FAMILY MEDICINE

## 2019-01-11 PROCEDURE — 74011250637 HC RX REV CODE- 250/637: Performed by: FAMILY MEDICINE

## 2019-01-11 PROCEDURE — 74011250636 HC RX REV CODE- 250/636: Performed by: FAMILY MEDICINE

## 2019-01-11 PROCEDURE — 36415 COLL VENOUS BLD VENIPUNCTURE: CPT

## 2019-01-11 PROCEDURE — 77030034850

## 2019-01-11 PROCEDURE — 94640 AIRWAY INHALATION TREATMENT: CPT

## 2019-01-11 PROCEDURE — 36569 INSJ PICC 5 YR+ W/O IMAGING: CPT | Performed by: FAMILY MEDICINE

## 2019-01-11 PROCEDURE — 05H533Z INSERTION OF INFUSION DEVICE INTO RIGHT SUBCLAVIAN VEIN, PERCUTANEOUS APPROACH: ICD-10-PCS | Performed by: FAMILY MEDICINE

## 2019-01-11 PROCEDURE — 74011000250 HC RX REV CODE- 250: Performed by: FAMILY MEDICINE

## 2019-01-11 PROCEDURE — 74011000258 HC RX REV CODE- 258: Performed by: INTERNAL MEDICINE

## 2019-01-11 PROCEDURE — 94660 CPAP INITIATION&MGMT: CPT

## 2019-01-11 PROCEDURE — 76937 US GUIDE VASCULAR ACCESS: CPT

## 2019-01-11 PROCEDURE — 36600 WITHDRAWAL OF ARTERIAL BLOOD: CPT

## 2019-01-11 RX ORDER — BISACODYL 5 MG
10 TABLET, DELAYED RELEASE (ENTERIC COATED) ORAL DAILY
Status: DISCONTINUED | OUTPATIENT
Start: 2019-01-12 | End: 2019-01-11

## 2019-01-11 RX ORDER — LORAZEPAM 2 MG/ML
INJECTION INTRAMUSCULAR
Status: COMPLETED
Start: 2019-01-11 | End: 2019-01-11

## 2019-01-11 RX ORDER — NYSTATIN 100000 [USP'U]/G
POWDER TOPICAL 2 TIMES DAILY
Status: DISCONTINUED | OUTPATIENT
Start: 2019-01-11 | End: 2019-01-21 | Stop reason: HOSPADM

## 2019-01-11 RX ORDER — LORAZEPAM 2 MG/ML
INJECTION INTRAMUSCULAR
Status: ACTIVE
Start: 2019-01-11 | End: 2019-01-11

## 2019-01-11 RX ORDER — SODIUM CHLORIDE 0.9 % (FLUSH) 0.9 %
10 SYRINGE (ML) INJECTION AS NEEDED
Status: DISCONTINUED | OUTPATIENT
Start: 2019-01-11 | End: 2019-01-16

## 2019-01-11 RX ORDER — LORAZEPAM 2 MG/ML
1 INJECTION INTRAMUSCULAR ONCE
Status: COMPLETED | OUTPATIENT
Start: 2019-01-11 | End: 2019-01-11

## 2019-01-11 RX ORDER — LORAZEPAM 2 MG/ML
1 INJECTION INTRAMUSCULAR
Status: DISCONTINUED | OUTPATIENT
Start: 2019-01-11 | End: 2019-01-11

## 2019-01-11 RX ORDER — ENOXAPARIN SODIUM 100 MG/ML
40 INJECTION SUBCUTANEOUS EVERY 12 HOURS
Status: DISCONTINUED | OUTPATIENT
Start: 2019-01-11 | End: 2019-01-13

## 2019-01-11 RX ORDER — BISACODYL 5 MG
10 TABLET, DELAYED RELEASE (ENTERIC COATED) ORAL
Status: DISCONTINUED | OUTPATIENT
Start: 2019-01-11 | End: 2019-01-21 | Stop reason: HOSPADM

## 2019-01-11 RX ORDER — HYDRALAZINE HYDROCHLORIDE 20 MG/ML
20 INJECTION INTRAMUSCULAR; INTRAVENOUS
Status: ACTIVE | OUTPATIENT
Start: 2019-01-11 | End: 2019-01-11

## 2019-01-11 RX ORDER — LORAZEPAM 2 MG/ML
1 INJECTION INTRAMUSCULAR
Status: COMPLETED | OUTPATIENT
Start: 2019-01-11 | End: 2019-01-11

## 2019-01-11 RX ORDER — SODIUM CHLORIDE 0.9 % (FLUSH) 0.9 %
10 SYRINGE (ML) INJECTION EVERY 8 HOURS
Status: DISCONTINUED | OUTPATIENT
Start: 2019-01-11 | End: 2019-01-21 | Stop reason: HOSPADM

## 2019-01-11 RX ORDER — DOCUSATE SODIUM 100 MG/1
400 CAPSULE, LIQUID FILLED ORAL
Status: DISCONTINUED | OUTPATIENT
Start: 2019-01-11 | End: 2019-01-21 | Stop reason: HOSPADM

## 2019-01-11 RX ORDER — DOCUSATE SODIUM 100 MG/1
400 CAPSULE, LIQUID FILLED ORAL DAILY
Status: DISCONTINUED | OUTPATIENT
Start: 2019-01-12 | End: 2019-01-11

## 2019-01-11 RX ADMIN — GUAIFENESIN 1200 MG: 600 TABLET, EXTENDED RELEASE ORAL at 21:29

## 2019-01-11 RX ADMIN — IPRATROPIUM BROMIDE AND ALBUTEROL SULFATE 3 ML: .5; 3 SOLUTION RESPIRATORY (INHALATION) at 11:59

## 2019-01-11 RX ADMIN — LORAZEPAM 1 MG: 2 INJECTION, SOLUTION INTRAMUSCULAR; INTRAVENOUS at 00:40

## 2019-01-11 RX ADMIN — IPRATROPIUM BROMIDE AND ALBUTEROL SULFATE 3 ML: .5; 3 SOLUTION RESPIRATORY (INHALATION) at 19:32

## 2019-01-11 RX ADMIN — IPRATROPIUM BROMIDE AND ALBUTEROL SULFATE 3 ML: .5; 3 SOLUTION RESPIRATORY (INHALATION) at 03:21

## 2019-01-11 RX ADMIN — PRAVASTATIN SODIUM 40 MG: 20 TABLET ORAL at 21:29

## 2019-01-11 RX ADMIN — ENOXAPARIN SODIUM 40 MG: 40 INJECTION SUBCUTANEOUS at 09:35

## 2019-01-11 RX ADMIN — CARVEDILOL 25 MG: 25 TABLET, FILM COATED ORAL at 16:36

## 2019-01-11 RX ADMIN — NYSTATIN: 100000 POWDER TOPICAL at 09:36

## 2019-01-11 RX ADMIN — IPRATROPIUM BROMIDE AND ALBUTEROL SULFATE 3 ML: .5; 3 SOLUTION RESPIRATORY (INHALATION) at 07:44

## 2019-01-11 RX ADMIN — ENOXAPARIN SODIUM 40 MG: 40 INJECTION SUBCUTANEOUS at 21:30

## 2019-01-11 RX ADMIN — LORAZEPAM 1 MG: 2 INJECTION INTRAMUSCULAR; INTRAVENOUS at 04:49

## 2019-01-11 RX ADMIN — Medication 10 ML: at 05:01

## 2019-01-11 RX ADMIN — NYSTATIN: 100000 POWDER TOPICAL at 17:57

## 2019-01-11 RX ADMIN — WARFARIN SODIUM 5 MG: 5 TABLET ORAL at 17:57

## 2019-01-11 RX ADMIN — METHYLPREDNISOLONE SODIUM SUCCINATE 40 MG: 40 INJECTION, POWDER, FOR SOLUTION INTRAMUSCULAR; INTRAVENOUS at 21:29

## 2019-01-11 RX ADMIN — LORAZEPAM 1 MG: 2 INJECTION INTRAMUSCULAR at 04:49

## 2019-01-11 RX ADMIN — CEFTRIAXONE 1 G: 1 INJECTION, POWDER, FOR SOLUTION INTRAMUSCULAR; INTRAVENOUS at 19:19

## 2019-01-11 RX ADMIN — Medication 10 ML: at 01:25

## 2019-01-11 RX ADMIN — IPRATROPIUM BROMIDE AND ALBUTEROL SULFATE 3 ML: .5; 3 SOLUTION RESPIRATORY (INHALATION) at 23:00

## 2019-01-11 RX ADMIN — LORAZEPAM 1 MG: 2 INJECTION INTRAMUSCULAR; INTRAVENOUS at 01:24

## 2019-01-11 RX ADMIN — TUBERCULIN PURIFIED PROTEIN DERIVATIVE 5 UNITS: 5 INJECTION, SOLUTION INTRADERMAL at 01:11

## 2019-01-11 RX ADMIN — AZITHROMYCIN MONOHYDRATE 500 MG: 500 INJECTION, POWDER, LYOPHILIZED, FOR SOLUTION INTRAVENOUS at 19:30

## 2019-01-11 RX ADMIN — Medication 10 ML: at 16:36

## 2019-01-11 RX ADMIN — BISACODYL 10 MG: 5 TABLET, COATED ORAL at 21:29

## 2019-01-11 RX ADMIN — HYDROCODONE BITARTRATE AND ACETAMINOPHEN 1 TABLET: 10; 325 TABLET ORAL at 21:29

## 2019-01-11 RX ADMIN — METHYLPREDNISOLONE SODIUM SUCCINATE 40 MG: 40 INJECTION, POWDER, FOR SOLUTION INTRAMUSCULAR; INTRAVENOUS at 16:35

## 2019-01-11 RX ADMIN — LORATADINE 10 MG: 10 TABLET ORAL at 17:57

## 2019-01-11 RX ADMIN — Medication 10 ML: at 21:31

## 2019-01-11 RX ADMIN — FUROSEMIDE 40 MG: 10 INJECTION, SOLUTION INTRAMUSCULAR; INTRAVENOUS at 09:35

## 2019-01-11 RX ADMIN — DOCUSATE SODIUM 400 MG: 100 CAPSULE, LIQUID FILLED ORAL at 21:28

## 2019-01-11 NOTE — ED NOTES
Pt moving all around in bed after BiPap mask change causing raise in BP. Will advise Dr. Shannon Moore.

## 2019-01-11 NOTE — ED NOTES
In getting pt ready to go to the floor, he now responsive to pain only and all he does is eye opening and moaning. Supervisor advised, hospitalist paged and spoke with Dr. Cresencio Smith. Advised her of pts decline in consciousness, and O2 sat. ABG has been ordered. Pt to remain in ER. RR 28 and shallow.

## 2019-01-11 NOTE — PROGRESS NOTES
Pt suddenly very agitated and yelling at RN. Swinging arms at staff and pulling off Bipap mask. Unable to calm pt or redirect behavior. MD notified and ativan given IV per new PRN order. Pt resting calmly at this time and appears to be in NAD. Will continue to monitor. VSS.

## 2019-01-11 NOTE — PROGRESS NOTES
Unable to complete admission database or ICU consent. No family present and pt is unable to answer questions at this time.

## 2019-01-11 NOTE — CONSULTS
CONSULT NOTE    Maylin العراقي    1/11/2019    Date of Admission:  1/10/2019    The patient's chart is reviewed and the patient is discussed with the staff. Subjective:     Patient is a 77 y.o.  male  evaluated at the request of Dr. Bhavana Gifford. With  history of coronary artery disease, ischemic cardipmyopathy, hypertension, factor V Leiden, CABG, AICD for nonsustained V. tach, phlebitis, DVT, paroxysmal atrial fibrillation, aortic stenosis presents from his primary care office for pneumonia. Pt does have chronic sob on exertion since cabg- several years ago. Since past 2 weeks- nasal congestion,increased sob-- no fever or headache or dizziness or chest pain. Went to pcp1/10 for his INR check- daughter complained that pt was looking ill- wanted pcp to evaluate- found to be hypoxic - oxygen sat 83%- was given breathing treatment- was told that has pneumonia- sent to er for further evaluation.     In er found to have oxygen saturation 89- cxr- cardiomegaly with infiltrate- started in rocephin and zithromax. Mild elevated d-dimer- ordered ct chest with contrast-neg for pe . Overnight he has been on bipap and was agitated . Given ativan and now unreponsive despite improved abgs        Review of Systems  Review of systems not obtained due to patient factors.     Patient Active Problem List   Diagnosis Code    Obesity E66.9    Coronary artery disease I25.10    Cardiomyopathy, ischemic I25.5    Factor 5 Leiden mutation, heterozygous ([facility] D68.51    Hyperlipidemia E78.5    Carotid occlusion, bilateral I65.23    Osteoarthritis M19.90    Chronic back pain M54.9, G89.29    Chronic systolic congestive heart failure (HCC) I50.22    AICD (automatic cardioverter/defibrillator) present Z95.810    DVT, lower extremity, recurrent (HCC) I82.409    Paroxysmal atrial fibrillation (HCC) I48.0    Aortic stenosis, mild to moderate I35.0    Essential hypertension I10    Severe obesity (BMI 35.0-39. 9) E66.01    Hyperkalemia E87.5    Hypoxia R09.02    PNA (pneumonia) J18.9    Respiratory failure with hypercapnia (HCC) J96.92    COPD exacerbation (HCC) J44.1           Prior to Admission Medications   Prescriptions Last Dose Informant Patient Reported? Taking? HYDROcodone-acetaminophen (NORCO)  mg tablet   No No   Sig: Take one tablet by mouth every 4 to 6 hours prn for pain. MULTIVITS-MINERALS/FA/LYCOPENE (MEN'S DAILY PO)   Yes No   Sig: Take 1 Tab by mouth daily. amLODIPine (NORVASC) 10 mg tablet   No No   Sig: TAKE 1 TABLET BY MOUTH ONCE DAILY   aspirin 81 mg tablet   Yes No   Sig: Take 81 mg by mouth. TAKE AM OF SURGERY WITH SMALL SIP OF WATER   carvedilol (COREG) 25 mg tablet   No No   Sig: TAKE 1 TABLET BY MOUTH TWICE DAILY WITH MEALS   furosemide (LASIX) 40 mg tablet   No No   Sig: TAKE 1 TABLET BY MOUTH DAILY   guaiFENesin (MUCINEX) 1,200 mg tp12 ER tablet   Yes No   Sig: Take 1,200 mg by mouth two (2) times a day.    lisinopril (PRINIVIL, ZESTRIL) 20 mg tablet   No No   Sig: TAKE 1 TABLET BY MOUTH TWICE DAILY FOR HIGH BLOOD PRESSURE   pravastatin (PRAVACHOL) 40 mg tablet   No No   Sig: TAKE 1 TABLET BY MOUTH EVERY EVENING   warfarin (COUMADIN) 5 mg tablet   No No   Sig: TAKE 2 TABLET BY MOUTH MONDAY AND FRIDAY AND 1 1/2 TABLET BY MOUTH THE OTHER DAYS   Patient taking differently: 10mg on Sun,MOn ,Wed,Thurs,Fri,SAt, 5mg on Tues      Facility-Administered Medications: None       Past Medical History:   Diagnosis Date    AICD (automatic cardioverter/defibrillator) present 10/27/2015    Aortic stenosis, mild to moderate 10/27/2015    Cardiomyopathy, ischemic 8/17/2012    Carotid occlusion, bilateral 8/20/2012    Chronic back pain 2/12/2015    Chronic rhinitis 10/27/2015    Chronic systolic congestive heart failure ([facility] [address] 10/27/2015    NYHA class 2    Coronary artery disease 8/17/2012    DVT, lower extremity, recurrent ([facility] [address]     1994 and 2003 - on chronic coumadin    Factor 5 Leiden mutation, heterozygous ([facility] 2012    Heart attack ([facility]     \"I had a heart attack the day I was being discharged from my triple by-pass\"    History of alcoholism ([facility] quit age 34    History of complete eye exam 10/2016    Hx of smoking quit after 19    Hyperlipidemia 2012    Hypertension 2012    NSVT (nonsustained ventricular tachycardia) ([facility]     Obesity 2012    Osteoarthritis 2015    Pacemaker     Paroxysmal atrial fibrillation ([facility] 10/27/2015    Phlebitis and thrombophlebitis ,     due to Factor 5 Leiden    S/P total knee arthroplasty 2015    Tobacco abuse 2015    Wears dentures      Past Surgical History:   Procedure Laterality Date    HX ANGIOPLASTY      rotoblator    HX CATARACT REMOVAL      iop    HX CORONARY ARTERY BYPASS GRAFT  x 3, 2011    in [facility] [address]  2011    +Defibrilator placement    HX LIPOMA RESECTION  1998    back    HX LYMPH NODE DISSECTION      Lymph node bx/removal from back    HX ORTHOPAEDIC Left     Knee surgery     Social History     Socioeconomic History    Marital status:      Spouse name: Not on file    Number of children: Not on file    Years of education: Not on file    Highest education level: Not on file   Social Needs    Financial resource strain: Not on file    Food insecurity - worry: Not on file    Food insecurity - inability: Not on file   [employer] needs - medical: Not on file   [employer] needs - non-medical: Not on file   Occupational History    Not on file   Tobacco Use    Smoking status: Former Smoker     Packs/day: 1.00     Years: 20.00     Pack years: 20.00     Last attempt to quit: 1972     Years since quittin.0    Smokeless tobacco: Never Used   Substance and Sexual Activity    Alcohol use: No     Comment: quit at the age 34    Drug use: No    Sexual activity: Not on file   Other Topics Concern  Not on file   Social History Narrative    . First wife  , remarried 10/99. [place] in [place] 11[place] grade education. [address] [place] [address] Rd. Hobbies include fishing and travel. No children.      Family History   Problem Relation Age of Onset    Heart Disease Mother          CHF age 71    Other Father         blood clot  age 76    Cancer Sister 68        Pancreatic    Cancer Brother         Skin    Heart Disease Brother         Heart Valve     No Known Allergies    Current Facility-Administered Medications   Medication Dose Route Frequency    hydrALAZINE (APRESOLINE) 20 mg/mL injection 20 mg  20 mg IntraVENous NOW    LORazepam (ATIVAN) injection 1 mg  1 mg IntraVENous Q6H PRN    sodium chloride (NS) flush 5-40 mL  5-40 mL IntraVENous Q8H    sodium chloride (NS) flush 5-40 mL  5-40 mL IntraVENous PRN    amLODIPine (NORVASC) tablet 10 mg  10 mg Oral DAILY    aspirin delayed-release tablet 81 mg  81 mg Oral DAILY    carvedilol (COREG) tablet 25 mg  25 mg Oral BID WITH MEALS    guaiFENesin ER (MUCINEX) tablet 1,200 mg  1,200 mg Oral BID    HYDROcodone-acetaminophen (NORCO)  mg tablet 1 Tab  1 Tab Oral Q6H PRN    lisinopril (PRINIVIL, ZESTRIL) tablet 20 mg  20 mg Oral DAILY    pravastatin (PRAVACHOL) tablet 40 mg  40 mg Oral QHS    sodium chloride (NS) flush 5-40 mL  5-40 mL IntraVENous Q8H    sodium chloride (NS) flush 5-40 mL  5-40 mL IntraVENous PRN    acetaminophen (TYLENOL) tablet 650 mg  650 mg Oral Q4H PRN    morphine injection 1 mg  1 mg IntraVENous Q4H PRN    naloxone (NARCAN) injection 0.4 mg  0.4 mg IntraVENous PRN    ondansetron (ZOFRAN) injection 4 mg  4 mg IntraVENous Q4H PRN    bisacodyl (DULCOLAX) tablet 5 mg  5 mg Oral DAILY PRN    hydrALAZINE (APRESOLINE) 20 mg/mL injection 10 mg  10 mg IntraVENous Q6H PRN    albuterol-ipratropium (DUO-NEB) 2.5 MG-0.5 MG/3 ML  3 mL Nebulization Q4H RT    methylPREDNISolone (PF) (SOLU-MEDROL) injection 40 mg  40 mg IntraVENous DAILY    furosemide (LASIX) injection 40 mg  40 mg IntraVENous DAILY    loratadine (CLARITIN) tablet 10 mg  10 mg Oral DAILY    warfarin (COUMADIN) tablet 5 mg  5 mg Oral QPM    tuberculin injection 5 Units  5 Units IntraDERMal ONCE         Objective:     Vitals:    01/11/19 0630 01/11/19 0715 01/11/19 0738 01/11/19 0739   BP: 95/49 111/59     Pulse: 66 70     Resp: 16 20     Temp:  98 °F (36.7 °C)     SpO2: 96% 96% 95% 96%   Weight:           PHYSICAL EXAM     Constitutional:  the patient is well developed and in no acute distress  EENMT:  Sclera clear, pupils equal, oral mucosa moist  Respiratory: some rhonchi  Cardiovascular:  RRR without M,G,R  Gastrointestinal: soft and non-tender; with positive bowel sounds. Musculoskeletal: warm without cyanosis. There is 1+ lower leg edema. Skin:  no jaundice or rashes, no wounds   Neurologic: no gross neuro deficits     Psychiatric:  unresponsive    CXR:        Recent Labs     01/11/19  0340 01/10/19  1723 01/10/19  1711 01/10/19  1411   WBC 8.4  --  8.8  --    HGB 14.5  --  15.2  --    HCT 48.8  --  49.5  --      --  164  --    INR 1.5 1.6  --  1.8     Recent Labs     01/11/19  0340 01/10/19  1711   * 131*   K 5.0 5.2*   CL 91* 91*   * 93   CO2 39* 39*   BUN 27* 25*   CREA 1.06 1.15   CA 8.2* 8.7   ALB  --  3.1*   TBILI  --  0.5   ALT  --  20   SGOT  --  41*     No results for input(s): PH, PCO2, PO2, HCO3 in the last 72 hours. No results for input(s): LCAD, LAC in the last 72 hours.     Assessment:  (Medical Decision Making)     Hospital Problems  Date Reviewed: 10/2/2018          Codes Class Noted POA    COPD exacerbation ([facility] [address] ICD-10-CM: J44.1  ICD-9-CM: 491.21  1/11/2019 Unknown        Hyperkalemia ICD-10-CM: E87.5  ICD-9-CM: 276.7  1/10/2019 Unknown        Hypoxia ICD-10-CM: R09.02  ICD-9-CM: 799.02  1/10/2019 Unknown        * (Principal) PNA (pneumonia) ICD-10-CM: J18.9  ICD-9-CM: 702  1/10/2019 Unknown        Respiratory failure with hypercapnia [facility]) ICD-10-CM: J96.92  ICD-9-CM: 518.81  1/10/2019 Unknown        Essential hypertension ICD-10-CM: I10  ICD-9-CM: 401.9  2/6/2017 Yes        Chronic systolic congestive heart failure ([facility] ICD-10-CM: I50.22  ICD-9-CM: 428.22, 428.0  10/27/2015 Yes        AICD (automatic cardioverter/defibrillator) present ICD-10-CM: Z95.810  ICD-9-CM: V45.02  10/27/2015 Yes        Paroxysmal atrial fibrillation ([facility] ICD-10-CM: I48.0  ICD-9-CM: 427.31  10/27/2015 Yes        Aortic stenosis, mild to moderate ICD-10-CM: I35.0  ICD-9-CM: 424.1  10/27/2015 Yes        Osteoarthritis (Chronic) ICD-10-CM: M19.90  ICD-9-CM: 715.90  2/12/2015 Yes    Overview Signed 10/27/2015  9:58 AM by Ivana Ruff     With severe chronic low back pain.              Chronic back pain (Chronic) ICD-10-CM: M54.9, G89.29  ICD-9-CM: 724.5, 338.29  2/12/2015 Yes        Obesity (Chronic) ICD-10-CM: E66.9  ICD-9-CM: 278.00  8/17/2012 Yes        Coronary artery disease (Chronic) ICD-10-CM: I25.10  ICD-9-CM: 414.00  8/17/2012 Yes    Overview Signed 8/17/2012  3:57 PM by Berdie Dense     Diagnosed 1999, CABG 12/2011             Cardiomyopathy, ischemic (Chronic) ICD-10-CM: I25.5  ICD-9-CM: 414.8  8/17/2012 Yes    Overview Signed 8/17/2012  3:58 PM by Berdie Dense     Pacemaker AICD 12/2011,  35% LUEF             Factor 5 Leiden mutation, heterozygous ([facility] (Chronic) ICD-10-CM: D29.17  ICD-9-CM: 289.81  8/17/2012 Yes    Overview Signed 8/17/2012  4:02 PM by Berdie Dense     Chronic anticoag., hx DVT             Hyperlipidemia (Chronic) ICD-10-CM: E78.5  ICD-9-CM: 272.4  8/17/2012 Yes              Plan:  (Medical Decision Making)   1   Hold ativan  2  If agitated started precedex  3  antibx for cap- rocephine and zmax  4  Albuterol rx  5  Iv steroids  6  Continue bipap -may be able to change to nc or optiflow today  --    More than 50% of the time documented was spent in face-to-face contact with the patient and in the care of the patient on the floor/unit where the patient is located. Thank you very much for this referral.  We appreciate the opportunity to participate in this patient's care. Will follow along with above stated plan.     Henok Morel MD

## 2019-01-11 NOTE — PROGRESS NOTES
Report received from Rex LIU RN;  Pt currently on bipap, withdraws from pain. Pt will not follow commands at this time. reveiwed all lab work. Awaiting pulmonology for further orders.

## 2019-01-11 NOTE — PROGRESS NOTES
MIDLINE Placement Note PRE-PROCEDURE VERIFICATION 
PROCEDURE DETAIL Time out completed with Shadia Israel RN and everyone in agreement with procedure. A single lumen Midline was started for vascular access and desire for reliable access. The following documentation is in addition to the Midline properties in the lines/airways flowsheet : 
Lot #: [id number] Xylocaine used: yes Mid-Arm Circumference: 38 (cm) Internal Catheter Length: 10 (cm) Internal Catheter Total Length: 10 (cm) Vein Selection for Midline:right cephalic Midline was threaded over guidewire after PICC was unsuccessful. Line is okay to use:

## 2019-01-11 NOTE — ED NOTES
TRANSFER - OUT REPORT: 
 
Verbal report given to Sri Vallejo RN (name) on Jude Fernandez  being transferred to 376 ICU (unit) for routine progression of care Report consisted of patients Situation, Background, Assessment and  
Recommendations(SBAR). Information from the following report(s) ED Summary was reviewed with the receiving nurse. Lines:  
Peripheral IV 01/10/19 Left Forearm (Active) Site Assessment Clean, dry, & intact 1/10/2019  5:31 PM  
Phlebitis Assessment 0 1/10/2019  5:31 PM  
Infiltration Assessment 0 1/10/2019  5:31 PM  
  
 
Opportunity for questions and clarification was provided. Patient transported with: 
 Registered Nurse and Magdaleno Duff.

## 2019-01-11 NOTE — PROGRESS NOTES
Interdisciplinary Rounds with staff. I also did an initial visit but patient was asleep. Don Mane M.Div.

## 2019-01-11 NOTE — PROGRESS NOTES
TRANSFER - IN REPORT: 
 
Verbal report received from Teofilo Cummings RN on Johana Vasquez  being received from Emergency Department for routine progression of care Report consisted of patients Situation, Background, Assessment and  
Recommendations(SBAR). Information from the following report(s) ED Summary, Intake/Output and Recent Results was reviewed with the receiving nurse. Opportunity for questions and clarification was provided. Assessment will be completed upon patients arrival to unit and care assumed.

## 2019-01-11 NOTE — PROGRESS NOTES
TRANSFER - IN REPORT: 
 
Verbal report received from MIKE Villalobos on [facility]  being received from ER for routine progression of care Report consisted of patients Situation, Background, Assessment and  
Recommendations(SBAR). Information from the following report(s) SBAR, Kardex, ED Summary, Procedure Summary, Intake/Output, MAR and Recent Results was reviewed with the receiving nurse. Opportunity for questions and clarification was provided.

## 2019-01-11 NOTE — PROGRESS NOTES
Dual skin assessment completed by Mary Desouza and MIKE Murrieta. Pt has excoriation to groin, LLE danita, abrasion to R ankles and L shin. Pt has a healing wound to his L knee. Bilateral feet very dry and flaky with very thick toe nails. Sacrum with 2 medium sized deep purple spots that appear to be possible DTI. Allevyn placed to sacrum for prophylaxis.

## 2019-01-11 NOTE — PROGRESS NOTES
Unsuccessful PICC Placement Note Correct Procedure: yes time out completed assistant with Freddie Leal RN all persons present in agreement with time out. PRE-PROCEDURE VERIFICATION Correct Site:  Yes Allergies verfied:  Yes Temperature: Temp: 98 °F (36.7 °C), Temperature Source: Temp Source: Axillary Recent Labs  
  01/11/19 
0340 BUN 27* CREA 1.06  
 INR 1.5 WBC 8.4 Allergies: @AL PROCEDURE DETAIL A single lumen PICC line was attempted for vascular access and desire for reliable access. Complication related to insertion:  PICC would not drop into SVC after numerous attempts. Lot #: [id number] Xylocaine used: yes Mid-Arm Circumference: 38 (cm)

## 2019-01-11 NOTE — PROGRESS NOTES
Hospitalist Progress Note Admit Date:  1/10/2019  4:02 PM  
Name:  Johana Vasquez Age:  77 y.o. 
:  1940 MRN:  [id number] PCP:  Delgado Sapmson MD 
Treatment Team: Attending Provider: Lakia Trejo MD; Consulting Provider: Lolly King MD; Utilization Review: Prudence Covert, RN Subjective:  
67-year-old male history of coronary artery disease, ischemic cardipmyopathy, hypertension, factor V Leiden, CABG, AICD for nonsustained V. tach, phlebitis, DVT, paroxysmal atrial fibrillation, aortic stenosis presents from his primary care office for pneumonia. Pt does have chronic sob on exertion since cabg- several years ago. Since past 2 weeks- nasal congestion,increased sob-- no fever or headache or dizziness or chest pain. Went to pcp today for his INR check- daughter complained that pt was looking ill- wanted pcp to evaluate- found to be hypoxic - oxygen sat 83%- was given breathing treatment- was told that has pneumonia- sent to er for further evaluation. 
  
In er found to have oxygen saturation 89- cxr- cardiomegaly with infiltrate- started in rocephin and zithromax. Mild elevated d-dimer- ordered ct chest with contrast - no pe 
  
Pt will be admitted for acute on chronic sob-secondary to pneumonia. 19 Pt in ICU presently on bipap- responding to painful stimuli Objective:  
 
Patient Vitals for the past 24 hrs: 
 Temp Pulse Resp BP SpO2  
19 1550     93 % 19 1330  63 22 105/55 93 % 19 1300  (!) 59 23 117/48 95 % 19 1230  61 30 109/50 94 % 19 1200  79 26 120/61 95 % 19 1155     94 % 19 1130  63 25 (!) 88/47 94 % 19 1100  (!) 59 (!) 33 102/53 94 % 19 1030  (!) 59 21 99/45 93 % 19 1000  60 20 102/54 93 % 19 0930  67 27 118/53 94 % 19 0900  71 19 114/57 93 % 19 0830  62 8 97/52 94 % 19 0800  70 11 101/56 95 % 01/11/19 0739     96 % 01/11/19 0738     95 % 01/11/19 0730  71 13 111/59 96 % 01/11/19 0715 98 °F (36.7 °C) 70 20 111/59 96 % 01/11/19 0700  70 15 95/49 95 % 01/11/19 0630  66 16 95/49 96 % 01/11/19 0602  63 20 97/55 96 % 01/11/19 0600  68 23 (!) 79/46 96 % 01/11/19 0504  64 10 129/66 97 % 01/11/19 0500  73 10 129/66 98 % 01/11/19 0430  64 17 159/67 97 % 01/11/19 0410     95 % 01/11/19 0400  74 21 114/60 94 % 01/11/19 0330 97.3 °F (36.3 °C) 67 19 102/51 97 % 01/11/19 0321     92 % 01/11/19 0300  66 20 112/58 93 % 01/11/19 0230  63 20 95/49 95 % 01/11/19 0223  66 17 101/52 93 % 01/11/19 0201     94 % 01/11/19 0200  64 19 (!) 82/47 95 % 01/11/19 0145  65 20 97/50 95 % 01/11/19 0135  69 19 104/54 93 % 01/11/19 0122 97.4 °F (36.3 °C) 76 26 151/72 96 % 01/11/19 0040  (!) 0  152/85 100 % 01/11/19 0031     96 % 01/11/19 0024  73  (!) 198/96 99 % 01/11/19 0013     100 % 01/10/19 2321     97 % 01/10/19 2320    148/73   
01/10/19 2300  (!) 59 14 106/57 99 % 01/10/19 2256  (!) 59 13  97 % 01/10/19 2240  62 19 118/58 95 % 01/10/19 2239     95 % 01/10/19 2220  68 20 162/72 92 % 01/10/19 2203  67 20 157/85 93 % 01/10/19 2201  66 (!) 32  93 % 01/10/19 2140  64 (!) 31 147/67 93 % 01/10/19 2120  63 29 129/60 92 % 01/10/19 2100  63 (!) 33 137/63   
01/10/19 2039  68 9  94 % 01/10/19 2030     (!) 88 % 01/10/19 2025     (!) 86 % 01/10/19 2020    123/57 (!) 86 % 01/10/19 2000    141/65 95 % 01/10/19 1940    152/65 96 % 01/10/19 1935     93 % 01/10/19 1905  60   94 % 01/10/19 1904    166/73 94 % 01/10/19 1851     (!) 82 % 01/10/19 1850     (!) 86 % Oxygen Therapy O2 Sat (%): 93 % (01/11/19 1550) Pulse via Oximetry: 82 beats per minute (01/11/19 1550) O2 Device: Hi flow nasal cannula (01/11/19 1550) O2 Flow Rate (L/min): 10 l/min (01/11/19 1550) O2 Temperature: 87.8 °F (31 °C) (01/11/19 1155) FIO2 (%): 50 % (01/11/19 1155) Intake/Output Summary (Last 24 hours) at 1/11/2019 1638 Last data filed at 1/11/2019 1383 Gross per 24 hour Intake  Output 1890 ml Net -1890 ml General:    Well nourished. On bipap- responding to painful stimuli 
heent- normal 
CV:   RRR. No murmur, rub, or gallop. Lungs:   Clear to auscultation bilaterally. No wheezing, rhonchi, or rales. Abdomen:   Soft, nontender, nondistended. Cns- responding to painful stimuli Extremities: Warm and dry. No cyanosis. bilateral lower ext edema. Wound left leg. Skin:     No rashes or jaundice. Data Review: 
I have reviewed all labs, meds, telemetry events, and studies from the last 24 hours. Recent Results (from the past 24 hour(s)) CBC WITH AUTOMATED DIFF Collection Time: 01/10/19  5:11 PM  
Result Value Ref Range WBC 8.8 4.3 - 11.1 K/uL  
 RBC 5.07 4.23 - 5.6 M/uL  
 HGB 15.2 13.6 - 17.2 g/dL HCT 49.5 41.1 - 50.3 % MCV 97.6 79.6 - 97.8 FL  
 MCH 30.0 26.1 - 32.9 PG  
 MCHC 30.7 (L) 31.4 - 35.0 g/dL  
 RDW 14.2 11.9 - 14.6 % PLATELET 064 134 - 206 K/uL MPV 11.9 9.4 - 12.3 FL ABSOLUTE NRBC 0.00 0.0 - 0.2 K/uL  
 DF AUTOMATED NEUTROPHILS 67 43 - 78 % LYMPHOCYTES 18 13 - 44 % MONOCYTES 11 4.0 - 12.0 % EOSINOPHILS 2 0.5 - 7.8 % BASOPHILS 1 0.0 - 2.0 % IMMATURE GRANULOCYTES 0 0.0 - 5.0 %  
 ABS. NEUTROPHILS 5.9 1.7 - 8.2 K/UL  
 ABS. LYMPHOCYTES 1.6 0.5 - 4.6 K/UL  
 ABS. MONOCYTES 1.0 0.1 - 1.3 K/UL  
 ABS. EOSINOPHILS 0.2 0.0 - 0.8 K/UL  
 ABS. BASOPHILS 0.1 0.0 - 0.2 K/UL  
 ABS. IMM. GRANS. 0.0 0.0 - 0.5 K/UL METABOLIC PANEL, COMPREHENSIVE Collection Time: 01/10/19  5:11 PM  
Result Value Ref Range Sodium 131 (L) 136 - 145 mmol/L Potassium 5.2 (H) 3.5 - 5.1 mmol/L Chloride 91 (L) 98 - 107 mmol/L  
 CO2 39 (H) 21 - 32 mmol/L  Anion gap 1 mmol/L  
 Glucose 93 65 - 100 mg/dL BUN 25 (H) 8 - 23 MG/DL Creatinine 1.15 0.8 - 1.5 MG/DL  
 GFR est AA >60 >60 ml/min/1.73m2 GFR est non-AA >60 ml/min/1.73m2 Calcium 8.7 8.3 - 10.4 MG/DL Bilirubin, total 0.5 0.2 - 1.1 MG/DL  
 ALT (SGPT) 20 12 - 65 U/L  
 AST (SGOT) 41 (H) 15 - 37 U/L Alk. phosphatase 72 50 - 136 U/L Protein, total 8.0 g/dL Albumin 3.1 (L) 3.2 - 4.6 g/dL Globulin 4.9 (H) 2.3 - 3.5 g/dL A-G Ratio 0.6 BNP Collection Time: 01/10/19  5:11 PM  
Result Value Ref Range BNP 98 pg/mL POC LACTIC ACID Collection Time: 01/10/19  5:16 PM  
Result Value Ref Range Lactic Acid (POC) 0.94 0.5 - 1.9 mmol/L  
D DIMER Collection Time: 01/10/19  5:23 PM  
Result Value Ref Range D DIMER 0.66 (HH) <0.56 ug/ml(FEU) PROTHROMBIN TIME + INR Collection Time: 01/10/19  5:23 PM  
Result Value Ref Range Prothrombin time 19.4 (H) 11.7 - 14.5 sec INR 1.6 GLUCOSE, POC Collection Time: 01/10/19 10:14 PM  
Result Value Ref Range Glucose (POC) 185 (H) 65 - 100 mg/dL POC G3 Collection Time: 01/10/19 10:19 PM  
Result Value Ref Range Device: Non rebreather FIO2 (POC) 100 % pH (POC) 7.164 (LL) 7.35 - 7.45    
 pCO2 (POC) 113.9 (HH) 35 - 45 MMHG  
 pO2 (POC) 84 75 - 100 MMHG  
 HCO3 (POC) 41.0 (H) 22 - 26 MMOL/L  
 sO2 (POC) 91 (L) 95 - 98 % Base excess (POC) 6 mmol/L Allens test (POC) YES Site RIGHT RADIAL Patient temp. 98.6 Specimen type (POC) ARTERIAL Performed by WarrantlygRT   
 CO2, POC 44 MMOL/L Flow rate (POC) 15.000 L/min Critical value read back 22:23 COLLECT TIME 2,215 POC G3 Collection Time: 01/11/19 12:25 AM  
Result Value Ref Range Device: BIPAP    
 FIO2 (POC) 45 % pH (POC) 7.254 (L) 7.35 - 7.45    
 pCO2 (POC) 93.9 (HH) 35 - 45 MMHG  
 pO2 (POC) 72 (L) 75 - 100 MMHG  
 HCO3 (POC) 41.6 (H) 22 - 26 MMOL/L  
 sO2 (POC) 90 (L) 95 - 98 % Base excess (POC) 9 mmol/L Tidal volume 600 ml Set Rate 14 bpm  
 PEEP/CPAP (POC) 8 cmH2O  
 PIP (POC) 25 Allens test (POC) YES Inspiratory Time 0.9 sec Site RIGHT RADIAL Patient temp. 98.6 Specimen type (POC) ARTERIAL Performed by Juan Jose)RT   
 CO2, POC 44 MMOL/L Respiratory comment: 1 Exhaled minute volume 20.50 L/min COLLECT TIME 20 METABOLIC PANEL, BASIC Collection Time: 01/11/19  3:40 AM  
Result Value Ref Range Sodium 133 (L) 136 - 145 mmol/L Potassium 5.0 3.5 - 5.1 mmol/L Chloride 91 (L) 98 - 107 mmol/L  
 CO2 39 (H) 21 - 32 mmol/L Anion gap 3 mmol/L Glucose 147 (H) 65 - 100 mg/dL BUN 27 (H) 8 - 23 MG/DL Creatinine 1.06 0.8 - 1.5 MG/DL  
 GFR est AA >60 >60 ml/min/1.73m2 GFR est non-AA >60 ml/min/1.73m2 Calcium 8.2 (L) 8.3 - 10.4 MG/DL  
CBC WITH AUTOMATED DIFF Collection Time: 01/11/19  3:40 AM  
Result Value Ref Range WBC 8.4 4.3 - 11.1 K/uL  
 RBC 4.89 4.23 - 5.6 M/uL  
 HGB 14.5 13.6 - 17.2 g/dL HCT 48.8 41.1 - 50.3 % MCV 99.8 (H) 79.6 - 97.8 FL  
 MCH 29.7 26.1 - 32.9 PG  
 MCHC 29.7 (L) 31.4 - 35.0 g/dL  
 RDW 13.9 11.9 - 14.6 % PLATELET 832 568 - 458 K/uL MPV 10.1 9.4 - 12.3 FL ABSOLUTE NRBC 0.00 0.0 - 0.2 K/uL  
 DF AUTOMATED NEUTROPHILS 92 (H) 43 - 78 % LYMPHOCYTES 6 (L) 13 - 44 % MONOCYTES 1 (L) 4.0 - 12.0 % EOSINOPHILS 0 (L) 0.5 - 7.8 % BASOPHILS 0 0.0 - 2.0 % IMMATURE GRANULOCYTES 0 0.0 - 5.0 %  
 ABS. NEUTROPHILS 7.7 1.7 - 8.2 K/UL  
 ABS. LYMPHOCYTES 0.5 0.5 - 4.6 K/UL  
 ABS. MONOCYTES 0.1 0.1 - 1.3 K/UL  
 ABS. EOSINOPHILS 0.0 0.0 - 0.8 K/UL  
 ABS. BASOPHILS 0.0 0.0 - 0.2 K/UL  
 ABS. IMM. GRANS. 0.0 0.0 - 0.5 K/UL PROTHROMBIN TIME + INR Collection Time: 01/11/19  3:40 AM  
Result Value Ref Range Prothrombin time 18.0 (H) 11.7 - 14.5 sec INR 1.5 POC G3 Collection Time: 01/11/19  5:23 AM  
Result Value Ref Range Device: BIPAP    
 FIO2 (POC) 50 %  pH (POC) 7.265 (L) 7.35 - 7.45    
 pCO2 (POC) 85.3 (HH) 35 - 45 MMHG  
 pO2 (POC) 68 (L) 75 - 100 MMHG  
 HCO3 (POC) 38.8 (H) 22 - 26 MMOL/L  
 sO2 (POC) 89 (L) 95 - 98 % Base excess (POC) 7 mmol/L Tidal volume 600 ml Set Rate 20 bpm  
 PEEP/CPAP (POC) 8 cmH2O  
 PIP (POC) 21 Allens test (POC) YES Inspiratory Time 1.0 sec Site RIGHT RADIAL Patient temp. 98.6 Specimen type (POC) ARTERIAL Performed by Juan Jose)RT   
 CO2, POC 41 MMOL/L Respiratory comment: 2 Exhaled minute volume 14.80 L/min COLLECT TIME 517 POC G3 Collection Time: 01/11/19 11:48 AM  
Result Value Ref Range Device: BIPAP    
 FIO2 (POC) 40 % pH (POC) 7.386 7.35 - 7.45    
 pCO2 (POC) 67.0 (HH) 35 - 45 MMHG  
 pO2 (POC) 57 (L) 75 - 100 MMHG  
 HCO3 (POC) 40.2 (H) 22 - 26 MMOL/L  
 sO2 (POC) 87 (L) 95 - 98 % Base excess (POC) 12 mmol/L Tidal volume 600 ml Set Rate 20 bpm  
 PIP (POC) 17 Allens test (POC) YES Inspiratory Time 1.0 sec Site RIGHT BRACHIAL Patient temp. 98.6 Specimen type (POC) ARTERIAL Performed by Jaida   
 CO2, POC 42 MMOL/L Critical value read back 00:01 Exhaled minute volume 9.50 L/min COLLECT TIME 955 All Micro Results None Current Meds: 
Current Facility-Administered Medications Medication Dose Route Frequency  LORazepam (ATIVAN) injection 1 mg  1 mg IntraVENous Q6H PRN  
 methylPREDNISolone (PF) (SOLU-MEDROL) injection 40 mg  40 mg IntraVENous Q8H  
 azithromycin (ZITHROMAX) 500 mg in 0.9% sodium chloride (MBP/ADV) 250 mL  500 mg IntraVENous Q24H  cefTRIAXone (ROCEPHIN) 1 g in 0.9% sodium chloride (MBP/ADV) 50 mL  1 g IntraVENous Q24H  
 enoxaparin (LOVENOX) injection 40 mg  40 mg SubCUTAneous Q12H  nystatin (MYCOSTATIN) 100,000 unit/gram powder   Topical BID  influenza vaccine 2018-19 (6 mos+)(PF) (FLUARIX QUAD/FLULAVAL QUAD) injection 0.5 mL  0.5 mL IntraMUSCular PRIOR TO DISCHARGE  
  sodium chloride (NS) flush 10 mL  10 mL InterCATHeter Q8H  
 sodium chloride (NS) flush 10 mL  10 mL InterCATHeter PRN  
 sodium chloride (NS) flush 5-40 mL  5-40 mL IntraVENous Q8H  
 sodium chloride (NS) flush 5-40 mL  5-40 mL IntraVENous PRN  
 amLODIPine (NORVASC) tablet 10 mg  10 mg Oral DAILY  aspirin delayed-release tablet 81 mg  81 mg Oral DAILY  carvedilol (COREG) tablet 25 mg  25 mg Oral BID WITH MEALS  guaiFENesin ER (MUCINEX) tablet 1,200 mg  1,200 mg Oral BID  
 HYDROcodone-acetaminophen (NORCO)  mg tablet 1 Tab  1 Tab Oral Q6H PRN  
 lisinopril (PRINIVIL, ZESTRIL) tablet 20 mg  20 mg Oral DAILY  pravastatin (PRAVACHOL) tablet 40 mg  40 mg Oral QHS  sodium chloride (NS) flush 5-40 mL  5-40 mL IntraVENous Q8H  
 sodium chloride (NS) flush 5-40 mL  5-40 mL IntraVENous PRN  
 acetaminophen (TYLENOL) tablet 650 mg  650 mg Oral Q4H PRN  
 morphine injection 1 mg  1 mg IntraVENous Q4H PRN  
 naloxone (NARCAN) injection 0.4 mg  0.4 mg IntraVENous PRN  
 ondansetron (ZOFRAN) injection 4 mg  4 mg IntraVENous Q4H PRN  
 bisacodyl (DULCOLAX) tablet 5 mg  5 mg Oral DAILY PRN  
 hydrALAZINE (APRESOLINE) 20 mg/mL injection 10 mg  10 mg IntraVENous Q6H PRN  
 albuterol-ipratropium (DUO-NEB) 2.5 MG-0.5 MG/3 ML  3 mL Nebulization Q4H RT  
 furosemide (LASIX) injection 40 mg  40 mg IntraVENous DAILY  loratadine (CLARITIN) tablet 10 mg  10 mg Oral DAILY  warfarin (COUMADIN) tablet 5 mg  5 mg Oral QPM  
 tuberculin injection 5 Units  5 Units IntraDERMal ONCE Other Studies (last 24 hours): Xr Chest Sngl V Result Date: 1/10/2019 Chest single view 1/10/2019 CLINICAL HISTORY: Worsening shortness of breath and congestion for 2 weeks.  FINDINGS: A request was made to only perform and interpret a lateral view of the chest. On this lateral view, there is a suggestion of a positive spine sign suggesting a basilar infiltrate which could reside on the left or right. No significant layering pleural effusion is seen. The cardiac silhouette does appear at least mildly enlarged. No clear pneumothorax is seen although this would be best appreciated on the frontal view. IMPRESSION: 1. Cardiomegaly and potential basilar infiltrate suggested on this lateral view. Ct Chest W Cont Result Date: 1/10/2019 CT OF THE CHEST WITH INTRAVENOUS CONTRAST. INDICATION: Shortness of breath. COMPARISON: None. TECHNIQUE:   2.5 mm axial scans from above the aortic arch to the lung bases with 100 cc nonionic intravenous contrast without acute complication. Intravenous contrast was given to evaluate for pulmonary embolism. FINDINGS:  The degree of opacification of the pulmonary arteries is adequate. No intraluminal filling defects within the pulmonary arterial tree to suggest pulmonary embolism. Aorta is normal caliber with a uniform lumen without evidence of dissection. Lungs demonstrates patchy densities in the bases. . Included portion of the upper abdomen unremarkable. Small hiatal hernia suspected. There are sternal wires and aortic calcifications. Left-sided cardiac pacemaker is present. Shlomo Joseph IMPRESSION:  Negative for pulmonary embolism. Small hiatal hernia. Left-sided cardiac pacemaker Assessment and Plan:  
 
Hospital Problems as of 1/11/2019 Date Reviewed: 10/2/2018 Codes Class Noted - Resolved POA  
 COPD exacerbation ([facility] [address] ICD-10-CM: J44.1 ICD-9-CM: 491.21  1/11/2019 - Present Unknown Hyperkalemia ICD-10-CM: E87.5 ICD-9-CM: 276.7  1/10/2019 - Present Unknown Hypoxia ICD-10-CM: R09.02 
ICD-9-CM: 799.02  1/10/2019 - Present Unknown * (Principal) PNA (pneumonia) ICD-10-CM: J18.9 ICD-9-CM: 769  1/10/2019 - Present Unknown Respiratory failure with hypercapnia ([facility] [address] ICD-10-CM: E50.98 
ICD-9-CM: 518.81  1/10/2019 - Present Unknown  Essential hypertension ICD-10-CM: I10 
 ICD-9-CM: 401.9  2/6/2017 - Present Yes Chronic systolic congestive heart failure (HCC) ICD-10-CM: I50.22 ICD-9-CM: 428.22, 428.0  10/27/2015 - Present Yes AICD (automatic cardioverter/defibrillator) present ICD-10-CM: Z95.810 ICD-9-CM: V45.02  10/27/2015 - Present Yes Paroxysmal atrial fibrillation (HCC) ICD-10-CM: I48.0 ICD-9-CM: 427.31  10/27/2015 - Present Yes Aortic stenosis, mild to moderate ICD-10-CM: I35.0 ICD-9-CM: 424.1  10/27/2015 - Present Yes Osteoarthritis (Chronic) ICD-10-CM: M19.90 ICD-9-CM: 715.90  2/12/2015 - Present Yes Overview Signed 10/27/2015  9:58 AM by Paresh Choi With severe chronic low back pain. Chronic back pain (Chronic) ICD-10-CM: M54.9, G89.29 ICD-9-CM: 724.5, 338.29  2/12/2015 - Present Yes Obesity (Chronic) ICD-10-CM: S99.6 ICD-9-CM: 278.00  8/17/2012 - Present Yes Coronary artery disease (Chronic) ICD-10-CM: I25.10 ICD-9-CM: 414.00  8/17/2012 - Present Yes Overview Signed 8/17/2012  3:57 PM by Gabriella Garza Diagnosed 1999, CABG 12/2011 Cardiomyopathy, ischemic (Chronic) ICD-10-CM: I25.5 ICD-9-CM: 414.8  8/17/2012 - Present Yes Overview Signed 8/17/2012  3:58 PM by Gabriella Garza Pacemaker AICD 12/2011,  35% LUEF Factor 5 Leiden mutation, [facility] ([facility] (Chronic) ICD-10-CM: F62.06 
ICD-9-CM: 289.81  8/17/2012 - Present Yes Overview Signed 8/17/2012  4:02 PM by Gabriella Garza Chronic anticoag., hx DVT Hyperlipidemia (Chronic) ICD-10-CM: C30.3 ICD-9-CM: 272.4  8/17/2012 - Present Yes PLAN:   
Acute hypoxic and hypercapnic resp failure- on bipap 
pna- rocephin zithromax Copd exa- steroids Obesity Sub therapeutic inr Responding to painful stimuli- got ativan - will hold 
htn Cad DC planning/Dispo: DVT ppx:  coumadin Signed: 
Odilia Chao MD

## 2019-01-11 NOTE — ED NOTES
TRANSFER - OUT REPORT: 
 
Verbal report given to [address] (name) on Dax Hinson  being transferred to [facility] (unit) for routine progression of care Report consisted of patients Situation, Background, Assessment and  
Recommendations(SBAR). Information from the following report(s) ED Summary was reviewed with the receiving nurse. Lines:  
Peripheral IV 01/10/19 Left Forearm (Active) Site Assessment Clean, dry, & intact 1/10/2019  5:31 PM  
Phlebitis Assessment 0 1/10/2019  5:31 PM  
Infiltration Assessment 0 1/10/2019  5:31 PM  
  
 
Opportunity for questions and clarification was provided. Patient transported with: 
 O2 @ 2 liters

## 2019-01-11 NOTE — PROGRESS NOTES
Both IV accesses infiltrated. Unable to find another access. Dr Sergio Brown notified and PICC line ordered. Kathleen with PICC team notified of order.

## 2019-01-11 NOTE — PROGRESS NOTES
Warfarin dosing per pharmacist 
 
Kaitlynn Loera is a 77 y.o. male. @[facility](37)@    @[facility] [place] [facility] [place]05[place]@ Indication:  atrial fibrillation Goal INR:  2 - 3 Home dose:   5 - 10 mg? Risk factors or significant drug interactions:  macrolide antibiotics Other anticoagulants:  none Daily Monitoring Date  INR     Warfarin dose HGB              Notes 1/10  1.6  5 mg  15.2 Will initiate Warfarin 5 mg every evening. Will continue to monitor closely. Thank you, Iván MerrillD, BCPS

## 2019-01-11 NOTE — PROGRESS NOTES
Called to bedside for ABG draw. Results showed Acute respiratory failure. Dr. Freya Meraz notified and BIPAP/AVAP ordered. Patient was placed on BIPAP with large, full face mask. Patient tolerating well due to being obtunded. Will repeat ABG in one hour.

## 2019-01-11 NOTE — INTERDISCIPLINARY ROUNDS
Interdisciplinary team rounds were held 1/11/2019 with the following team members:Nursing, Outcomes Management, Pastoral Care and Physician and the patient. Plan of care discussed. See clinical pathway and/or care plan for interventions and desired outcomes.

## 2019-01-11 NOTE — PROGRESS NOTES
Care Management Interventions PCP Verified by CM: Yes Mode of Transport at Discharge: BLS Transition of Care Consult (CM Consult): SNF Current Support Network: Own Home Confirm Follow Up Transport: Family Plan discussed with Pt/Family/Caregiver: Yes Freedom of Choice Offered: Yes Discharge Location Discharge Placement: Skilled nursing facility Visited with pt regarding plans for discharge, plans are for pt to go to a SNF after d/c. PT/OT and PPD ord. Gave dtr a SNF list to review. States that she plans on moving pt in with her after rehab.

## 2019-01-12 ENCOUNTER — APPOINTMENT (OUTPATIENT)
Dept: GENERAL RADIOLOGY | Age: 79
DRG: 193 | End: 2019-01-12
Attending: INTERNAL MEDICINE
Payer: MEDICARE

## 2019-01-12 LAB
ANION GAP SERPL CALC-SCNC: 5 MMOL/L
BASOPHILS # BLD: 0 K/UL (ref 0–0.2)
BASOPHILS NFR BLD: 0 % (ref 0–2)
BNP SERPL-MCNC: 102 PG/ML
BUN SERPL-MCNC: 32 MG/DL (ref 8–23)
CALCIUM SERPL-MCNC: 8.2 MG/DL (ref 8.3–10.4)
CHLORIDE SERPL-SCNC: 91 MMOL/L (ref 98–107)
CO2 SERPL-SCNC: 38 MMOL/L (ref 21–32)
CREAT SERPL-MCNC: 1.25 MG/DL (ref 0.8–1.5)
DIFFERENTIAL METHOD BLD: ABNORMAL
EOSINOPHIL # BLD: 0 K/UL (ref 0–0.8)
EOSINOPHIL NFR BLD: 0 % (ref 0.5–7.8)
ERYTHROCYTE [DISTWIDTH] IN BLOOD BY AUTOMATED COUNT: 14.1 % (ref 11.9–14.6)
GLUCOSE SERPL-MCNC: 165 MG/DL (ref 65–100)
HCT VFR BLD AUTO: 44.9 % (ref 41.1–50.3)
HGB BLD-MCNC: 14 G/DL (ref 13.6–17.2)
IMM GRANULOCYTES # BLD AUTO: 0 K/UL (ref 0–0.5)
IMM GRANULOCYTES NFR BLD AUTO: 1 % (ref 0–5)
INR PPP: 1.2
LYMPHOCYTES # BLD: 0.8 K/UL (ref 0.5–4.6)
LYMPHOCYTES NFR BLD: 9 % (ref 13–44)
MCH RBC QN AUTO: 29.6 PG (ref 26.1–32.9)
MCHC RBC AUTO-ENTMCNC: 31.2 G/DL (ref 31.4–35)
MCV RBC AUTO: 94.9 FL (ref 79.6–97.8)
MM INDURATION POC: 0 MM (ref 0–5)
MM INDURATION POC: 0 MM (ref 0–5)
MONOCYTES # BLD: 0.3 K/UL (ref 0.1–1.3)
MONOCYTES NFR BLD: 4 % (ref 4–12)
NEUTS SEG # BLD: 7.6 K/UL (ref 1.7–8.2)
NEUTS SEG NFR BLD: 87 % (ref 43–78)
NRBC # BLD: 0 K/UL (ref 0–0.2)
PLATELET # BLD AUTO: 165 K/UL (ref 150–450)
PMV BLD AUTO: 10.6 FL (ref 9.4–12.3)
POTASSIUM SERPL-SCNC: 4.2 MMOL/L (ref 3.5–5.1)
PPD POC: NEGATIVE NEGATIVE
PPD POC: NEGATIVE NEGATIVE
PROTHROMBIN TIME: 15.8 SEC (ref 11.7–14.5)
RBC # BLD AUTO: 4.73 M/UL (ref 4.23–5.6)
SODIUM SERPL-SCNC: 134 MMOL/L (ref 136–145)
WBC # BLD AUTO: 8.7 K/UL (ref 4.3–11.1)

## 2019-01-12 PROCEDURE — 74011250637 HC RX REV CODE- 250/637: Performed by: FAMILY MEDICINE

## 2019-01-12 PROCEDURE — 85610 PROTHROMBIN TIME: CPT

## 2019-01-12 PROCEDURE — 85025 COMPLETE CBC W/AUTO DIFF WBC: CPT

## 2019-01-12 PROCEDURE — 36592 COLLECT BLOOD FROM PICC: CPT

## 2019-01-12 PROCEDURE — 74011250637 HC RX REV CODE- 250/637: Performed by: INTERNAL MEDICINE

## 2019-01-12 PROCEDURE — 99233 SBSQ HOSP IP/OBS HIGH 50: CPT | Performed by: INTERNAL MEDICINE

## 2019-01-12 PROCEDURE — 74011250636 HC RX REV CODE- 250/636: Performed by: FAMILY MEDICINE

## 2019-01-12 PROCEDURE — 74011000250 HC RX REV CODE- 250: Performed by: FAMILY MEDICINE

## 2019-01-12 PROCEDURE — 94640 AIRWAY INHALATION TREATMENT: CPT

## 2019-01-12 PROCEDURE — 80048 BASIC METABOLIC PNL TOTAL CA: CPT

## 2019-01-12 PROCEDURE — 65610000001 HC ROOM ICU GENERAL

## 2019-01-12 PROCEDURE — 71045 X-RAY EXAM CHEST 1 VIEW: CPT

## 2019-01-12 PROCEDURE — 74011000258 HC RX REV CODE- 258: Performed by: INTERNAL MEDICINE

## 2019-01-12 PROCEDURE — 74011000250 HC RX REV CODE- 250: Performed by: INTERNAL MEDICINE

## 2019-01-12 PROCEDURE — 74011250636 HC RX REV CODE- 250/636: Performed by: INTERNAL MEDICINE

## 2019-01-12 PROCEDURE — 77010033711 HC HIGH FLOW OXYGEN

## 2019-01-12 PROCEDURE — 97162 PT EVAL MOD COMPLEX 30 MIN: CPT

## 2019-01-12 PROCEDURE — 83880 ASSAY OF NATRIURETIC PEPTIDE: CPT

## 2019-01-12 PROCEDURE — 97166 OT EVAL MOD COMPLEX 45 MIN: CPT

## 2019-01-12 PROCEDURE — 94660 CPAP INITIATION&MGMT: CPT

## 2019-01-12 RX ORDER — CALCIUM CARBONATE 200(500)MG
200 TABLET,CHEWABLE ORAL
Status: DISCONTINUED | OUTPATIENT
Start: 2019-01-12 | End: 2019-01-21 | Stop reason: HOSPADM

## 2019-01-12 RX ORDER — ACETAZOLAMIDE 500 MG/1
500 CAPSULE, EXTENDED RELEASE ORAL EVERY 12 HOURS
Status: COMPLETED | OUTPATIENT
Start: 2019-01-12 | End: 2019-01-13

## 2019-01-12 RX ORDER — IPRATROPIUM BROMIDE AND ALBUTEROL SULFATE 2.5; .5 MG/3ML; MG/3ML
3 SOLUTION RESPIRATORY (INHALATION)
Status: DISCONTINUED | OUTPATIENT
Start: 2019-01-12 | End: 2019-01-13

## 2019-01-12 RX ORDER — PANTOPRAZOLE SODIUM 40 MG/1
40 TABLET, DELAYED RELEASE ORAL
Status: DISCONTINUED | OUTPATIENT
Start: 2019-01-12 | End: 2019-01-21 | Stop reason: HOSPADM

## 2019-01-12 RX ADMIN — PRAVASTATIN SODIUM 40 MG: 20 TABLET ORAL at 21:18

## 2019-01-12 RX ADMIN — ASPIRIN 81 MG: 81 TABLET, COATED ORAL at 08:23

## 2019-01-12 RX ADMIN — FUROSEMIDE 40 MG: 10 INJECTION, SOLUTION INTRAMUSCULAR; INTRAVENOUS at 08:23

## 2019-01-12 RX ADMIN — GUAIFENESIN 1200 MG: 600 TABLET, EXTENDED RELEASE ORAL at 21:09

## 2019-01-12 RX ADMIN — CEFTRIAXONE 1 G: 1 INJECTION, POWDER, FOR SOLUTION INTRAMUSCULAR; INTRAVENOUS at 20:32

## 2019-01-12 RX ADMIN — IPRATROPIUM BROMIDE AND ALBUTEROL SULFATE 3 ML: .5; 3 SOLUTION RESPIRATORY (INHALATION) at 11:18

## 2019-01-12 RX ADMIN — Medication 10 ML: at 18:42

## 2019-01-12 RX ADMIN — Medication 10 ML: at 05:14

## 2019-01-12 RX ADMIN — DOCUSATE SODIUM 400 MG: 100 CAPSULE, LIQUID FILLED ORAL at 21:09

## 2019-01-12 RX ADMIN — ACETAZOLAMIDE 500 MG: 500 CAPSULE, EXTENDED RELEASE ORAL at 21:09

## 2019-01-12 RX ADMIN — NYSTATIN: 100000 POWDER TOPICAL at 08:22

## 2019-01-12 RX ADMIN — AZITHROMYCIN MONOHYDRATE 500 MG: 500 INJECTION, POWDER, LYOPHILIZED, FOR SOLUTION INTRAVENOUS at 21:08

## 2019-01-12 RX ADMIN — ACETAZOLAMIDE 500 MG: 500 CAPSULE, EXTENDED RELEASE ORAL at 09:46

## 2019-01-12 RX ADMIN — IPRATROPIUM BROMIDE AND ALBUTEROL SULFATE 3 ML: .5; 3 SOLUTION RESPIRATORY (INHALATION) at 07:36

## 2019-01-12 RX ADMIN — CARVEDILOL 25 MG: 25 TABLET, FILM COATED ORAL at 08:23

## 2019-01-12 RX ADMIN — IPRATROPIUM BROMIDE AND ALBUTEROL SULFATE 3 ML: .5; 3 SOLUTION RESPIRATORY (INHALATION) at 03:03

## 2019-01-12 RX ADMIN — IPRATROPIUM BROMIDE AND ALBUTEROL SULFATE 3 ML: .5; 3 SOLUTION RESPIRATORY (INHALATION) at 23:27

## 2019-01-12 RX ADMIN — CALCIUM CARBONATE 200 MG: 500 TABLET, CHEWABLE ORAL at 09:46

## 2019-01-12 RX ADMIN — BISACODYL 10 MG: 5 TABLET, COATED ORAL at 21:09

## 2019-01-12 RX ADMIN — CALCIUM CARBONATE 200 MG: 500 TABLET, CHEWABLE ORAL at 12:59

## 2019-01-12 RX ADMIN — PANTOPRAZOLE SODIUM 40 MG: 40 TABLET, DELAYED RELEASE ORAL at 09:46

## 2019-01-12 RX ADMIN — IPRATROPIUM BROMIDE AND ALBUTEROL SULFATE 3 ML: .5; 3 SOLUTION RESPIRATORY (INHALATION) at 19:16

## 2019-01-12 RX ADMIN — NYSTATIN: 100000 POWDER TOPICAL at 18:41

## 2019-01-12 RX ADMIN — IPRATROPIUM BROMIDE AND ALBUTEROL SULFATE 3 ML: .5; 3 SOLUTION RESPIRATORY (INHALATION) at 15:19

## 2019-01-12 RX ADMIN — LISINOPRIL 20 MG: 20 TABLET ORAL at 08:23

## 2019-01-12 RX ADMIN — CALCIUM CARBONATE 200 MG: 500 TABLET, CHEWABLE ORAL at 18:41

## 2019-01-12 RX ADMIN — ENOXAPARIN SODIUM 40 MG: 40 INJECTION SUBCUTANEOUS at 21:18

## 2019-01-12 RX ADMIN — AMLODIPINE BESYLATE 10 MG: 10 TABLET ORAL at 09:46

## 2019-01-12 RX ADMIN — METHYLPREDNISOLONE SODIUM SUCCINATE 40 MG: 40 INJECTION, POWDER, FOR SOLUTION INTRAMUSCULAR; INTRAVENOUS at 21:18

## 2019-01-12 RX ADMIN — Medication 10 ML: at 21:22

## 2019-01-12 RX ADMIN — ENOXAPARIN SODIUM 40 MG: 40 INJECTION SUBCUTANEOUS at 09:46

## 2019-01-12 RX ADMIN — LORATADINE 10 MG: 10 TABLET ORAL at 18:41

## 2019-01-12 RX ADMIN — WARFARIN SODIUM 5 MG: 5 TABLET ORAL at 18:41

## 2019-01-12 RX ADMIN — GUAIFENESIN 1200 MG: 600 TABLET, EXTENDED RELEASE ORAL at 08:23

## 2019-01-12 RX ADMIN — METHYLPREDNISOLONE SODIUM SUCCINATE 40 MG: 40 INJECTION, POWDER, FOR SOLUTION INTRAMUSCULAR; INTRAVENOUS at 05:15

## 2019-01-12 NOTE — PROGRESS NOTES
Patient A&Ox4, follows commands, eyes focus and track. Breath sounds diminished, symmetrical chest expansion on 10L High flow NC. NSR with BBB/paced on monitor, S1/S2 auscultated. Bowel sounds active, abdomen obese and distended. Skin with excoriation to groin, abrasion to LLE. Lines: RUE Midline Drains: dukes Patient denies pain at this time. Call light within reach.

## 2019-01-12 NOTE — PROGRESS NOTES
Hospitalist Progress Note Admit Date:  1/10/2019  4:02 PM  
Name:  Timbo Laird Age:  77 y.o. 
:  1940 MRN:  [id number] PCP:  Gabriela Monson MD 
Treatment Team: Attending Provider: Guillermina Elias MD; Consulting Provider: Jacy Harper MD; Utilization Review: Carrie Mcneil RN Subjective:  
44-year-old male history of coronary artery disease, ischemic cardipmyopathy, hypertension, factor V Leiden, CABG, AICD for nonsustained V. tach, phlebitis, DVT, paroxysmal atrial fibrillation, aortic stenosis presents from his primary care office for pneumonia. Pt does have chronic sob on exertion since cabg- several years ago. Since past 2 weeks- nasal congestion,increased sob-- no fever or headache or dizziness or chest pain. Went to pcp today for his INR check- daughter complained that pt was looking ill- wanted pcp to evaluate- found to be hypoxic - oxygen sat 83%- was given breathing treatment- was told that has pneumonia- sent to er for further evaluation. 
  
In er found to have oxygen saturation 89- cxr- cardiomegaly with infiltrate- started in rocephin and zithromax. Mild elevated d-dimer- ordered ct chest with contrast - no pe 
  
Pt will be admitted for acute on chronic sob-secondary to pneumonia. 19 Pt in ICU presently on bipap- responding to painful stimuli 19 Says breathing ok On high flow oxygen- used bipap last night Objective:  
 
Patient Vitals for the past 24 hrs: 
 Temp Pulse Resp BP SpO2  
19 1718 97.1 °F (36.2 °C)      
19 1519     96 % 19 1501    92/54   
19 1500  80 27  92 % 19 1401  86 23 127/57 96 % 19 1306  87 19  94 % 19 1301    110/57   
19 1256 98.4 °F (36.9 °C)      
19 1254    117/67 94 % 19 1202  83 26  92 % 19 1201    111/50   
19 1118     94 % 19 0909  88 23  97 % 01/12/19 0900  91 27 157/77 97 % 01/12/19 0807  93 19 174/81 96 % 01/12/19 0737     95 % 01/12/19 0707 99.5 °F (37.5 °C) 91 15  95 % 01/12/19 0700    143/74   
01/12/19 0659  87 24  95 % 01/12/19 0615     94 % 01/12/19 0600  91 25 142/77 97 % 01/12/19 0504  78 29 147/74 95 % 01/12/19 0500  83 22 147/74 94 % 01/12/19 0341 99.8 °F (37.7 °C) 73 25 118/59 94 % 01/12/19 0307     95 % 01/12/19 0303     94 % 01/12/19 0205  75 22 104/52 95 % 01/12/19 0103  79 25 141/72 94 % 01/12/19 0040 99.2 °F (37.3 °C) 80 26 122/64 93 % 01/12/19 0003  81 26 128/61 95 % 01/11/19 2306     93 % 01/11/19 2303  89 (!) 35 145/65 95 % 01/11/19 2301     95 % 01/11/19 2203  85 (!) 35 144/72 97 % 01/11/19 2103  79 (!) 35 118/64 97 % 01/11/19 2003  76 (!) 36 129/59 96 % 01/11/19 1932     98 % 01/11/19 1915 98.3 °F (36.8 °C) 79 25 119/63 96 % Oxygen Therapy O2 Sat (%): 96 % (01/12/19 1519) Pulse via Oximetry: 81 beats per minute (01/12/19 1519) O2 Device: Hi flow nasal cannula;Humidifier (01/12/19 1519) O2 Flow Rate (L/min): 8 l/min(decreased to 6 lpm) (01/12/19 1519) O2 Temperature: 87.8 °F (31 °C) (01/11/19 1155) FIO2 (%): 50 % (01/12/19 0307) Intake/Output Summary (Last 24 hours) at 1/12/2019 1719 Last data filed at 1/12/2019 1704 Gross per 24 hour Intake 895 ml Output 3025 ml Net -2130 ml General:    Well nourished. On nasal cannula mild resp distress 
heent- normal 
CV:   RRR. No murmur, rub, or gallop. Lungs:   Coarse beath sounds Abdomen:   Soft, nontender, nondistended. Cns-awake,alert oriented x3, no focal neurological deficits Extremities: Warm and dry. No cyanosis. bilateral lower ext edema. Skin:     No rashes or jaundice. Data Review: 
I have reviewed all labs, meds, telemetry events, and studies from the last 24 hours. Recent Results (from the past 24 hour(s)) PLEASE READ & DOCUMENT PPD TEST IN 24 HRS Collection Time: 01/12/19  1:22 AM  
Result Value Ref Range PPD Negative Negative  
 mm Induration 0 mm PROTHROMBIN TIME + INR Collection Time: 01/12/19  3:47 AM  
Result Value Ref Range Prothrombin time 15.8 (H) 11.7 - 14.5 sec INR 1.2 METABOLIC PANEL, BASIC Collection Time: 01/12/19  3:47 AM  
Result Value Ref Range Sodium 134 (L) 136 - 145 mmol/L Potassium 4.2 3.5 - 5.1 mmol/L Chloride 91 (L) 98 - 107 mmol/L  
 CO2 38 (H) 21 - 32 mmol/L Anion gap 5 mmol/L Glucose 165 (H) 65 - 100 mg/dL BUN 32 (H) 8 - 23 MG/DL Creatinine 1.25 0.8 - 1.5 MG/DL  
 GFR est AA >60 >60 ml/min/1.73m2 GFR est non-AA 59 ml/min/1.73m2 Calcium 8.2 (L) 8.3 - 10.4 MG/DL  
CBC WITH AUTOMATED DIFF Collection Time: 01/12/19  3:47 AM  
Result Value Ref Range WBC 8.7 4.3 - 11.1 K/uL  
 RBC 4.73 4.23 - 5.6 M/uL  
 HGB 14.0 13.6 - 17.2 g/dL HCT 44.9 41.1 - 50.3 % MCV 94.9 79.6 - 97.8 FL  
 MCH 29.6 26.1 - 32.9 PG  
 MCHC 31.2 (L) 31.4 - 35.0 g/dL  
 RDW 14.1 11.9 - 14.6 % PLATELET 681 685 - 190 K/uL MPV 10.6 9.4 - 12.3 FL ABSOLUTE NRBC 0.00 0.0 - 0.2 K/uL  
 DF AUTOMATED NEUTROPHILS 87 (H) 43 - 78 % LYMPHOCYTES 9 (L) 13 - 44 % MONOCYTES 4 4.0 - 12.0 % EOSINOPHILS 0 (L) 0.5 - 7.8 % BASOPHILS 0 0.0 - 2.0 % IMMATURE GRANULOCYTES 1 0.0 - 5.0 %  
 ABS. NEUTROPHILS 7.6 1.7 - 8.2 K/UL  
 ABS. LYMPHOCYTES 0.8 0.5 - 4.6 K/UL  
 ABS. MONOCYTES 0.3 0.1 - 1.3 K/UL  
 ABS. EOSINOPHILS 0.0 0.0 - 0.8 K/UL  
 ABS. BASOPHILS 0.0 0.0 - 0.2 K/UL  
 ABS. IMM. GRANS. 0.0 0.0 - 0.5 K/UL BNP Collection Time: 01/12/19  3:47 AM  
Result Value Ref Range  pg/mL All Micro Results None Current Meds: 
Current Facility-Administered Medications Medication Dose Route Frequency  acetaZOLAMIDE SR (DIAMOX) capsule 500 mg  500 mg Oral Q12H  pantoprazole (PROTONIX) tablet 40 mg  40 mg Oral ACB  calcium carbonate (TUMS) chewable tablet 200 mg [elemental]  200 mg Oral TID WITH MEALS  
 albuterol-ipratropium (DUO-NEB) 2.5 MG-0.5 MG/3 ML  3 mL Nebulization Q4H RT  
 methylPREDNISolone (PF) (SOLU-MEDROL) injection 40 mg  40 mg IntraVENous Q8H  
 azithromycin (ZITHROMAX) 500 mg in 0.9% sodium chloride (MBP/ADV) 250 mL  500 mg IntraVENous Q24H  cefTRIAXone (ROCEPHIN) 1 g in 0.9% sodium chloride (MBP/ADV) 50 mL  1 g IntraVENous Q24H  
 enoxaparin (LOVENOX) injection 40 mg  40 mg SubCUTAneous Q12H  nystatin (MYCOSTATIN) 100,000 unit/gram powder   Topical BID  influenza vaccine 2018-19 (6 mos+)(PF) (FLUARIX QUAD/FLULAVAL QUAD) injection 0.5 mL  0.5 mL IntraMUSCular PRIOR TO DISCHARGE  sodium chloride (NS) flush 10 mL  10 mL InterCATHeter Q8H  
 sodium chloride (NS) flush 10 mL  10 mL InterCATHeter PRN  
 bisacodyl (DULCOLAX) tablet 10 mg  10 mg Oral QHS  docusate sodium (COLACE) capsule 400 mg  400 mg Oral QHS  sodium chloride (NS) flush 5-40 mL  5-40 mL IntraVENous Q8H  
 sodium chloride (NS) flush 5-40 mL  5-40 mL IntraVENous PRN  
 amLODIPine (NORVASC) tablet 10 mg  10 mg Oral DAILY  aspirin delayed-release tablet 81 mg  81 mg Oral DAILY  carvedilol (COREG) tablet 25 mg  25 mg Oral BID WITH MEALS  guaiFENesin ER (MUCINEX) tablet 1,200 mg  1,200 mg Oral BID  
 HYDROcodone-acetaminophen (NORCO)  mg tablet 1 Tab  1 Tab Oral Q6H PRN  
 lisinopril (PRINIVIL, ZESTRIL) tablet 20 mg  20 mg Oral DAILY  pravastatin (PRAVACHOL) tablet 40 mg  40 mg Oral QHS  sodium chloride (NS) flush 5-40 mL  5-40 mL IntraVENous Q8H  
 sodium chloride (NS) flush 5-40 mL  5-40 mL IntraVENous PRN  
 acetaminophen (TYLENOL) tablet 650 mg  650 mg Oral Q4H PRN  
 morphine injection 1 mg  1 mg IntraVENous Q4H PRN  
 naloxone (NARCAN) injection 0.4 mg  0.4 mg IntraVENous PRN  
 ondansetron (ZOFRAN) injection 4 mg  4 mg IntraVENous Q4H PRN  
  hydrALAZINE (APRESOLINE) 20 mg/mL injection 10 mg  10 mg IntraVENous Q6H PRN  
 furosemide (LASIX) injection 40 mg  40 mg IntraVENous DAILY  loratadine (CLARITIN) tablet 10 mg  10 mg Oral DAILY  warfarin (COUMADIN) tablet 5 mg  5 mg Oral QPM  
 
 
Other Studies (last 24 hours): Xr Chest Sngl V Result Date: 1/12/2019 EXAM: Chest x-ray. DATE: January 12, 2019 INDICATION: Dyspnea. COMPARISON: January 10, 2019. TECHNIQUE: Frontal view chest x-ray. FINDINGS: There is progressed left lung base atelectasis or infiltrate. The right lung is clear. Again noted is cardiomegaly, a prior sternotomy and a left chest wall defibrillator. No pneumothorax or significant pleural effusion is seen. IMPRESSION: Progressed left lung base atelectasis or infiltrate. Assessment and Plan:  
 
Hospital Problems as of 1/12/2019 Date Reviewed: 10/2/2018 Codes Class Noted - Resolved POA  
 COPD exacerbation ([facility] ICD-10-CM: J44.1 ICD-9-CM: 491.21  1/11/2019 - Present Unknown Hyperkalemia ICD-10-CM: E87.5 ICD-9-CM: 276.7  1/10/2019 - Present Unknown Hypoxia ICD-10-CM: R09.02 
ICD-9-CM: 799.02  1/10/2019 - Present Unknown * (Principal) PNA (pneumonia) ICD-10-CM: J18.9 ICD-9-CM: 894  1/10/2019 - Present Unknown Respiratory failure with hypercapnia ([facility] ICD-10-CM: F85.27 
ICD-9-CM: 518.81  1/10/2019 - Present Unknown Essential hypertension ICD-10-CM: I10 
ICD-9-CM: 401.9  2/6/2017 - Present Yes Chronic systolic congestive heart failure (HCC) ICD-10-CM: I50.22 ICD-9-CM: 428.22, 428.0  10/27/2015 - Present Yes AICD (automatic cardioverter/defibrillator) present ICD-10-CM: Z95.810 ICD-9-CM: V45.02  10/27/2015 - Present Yes Paroxysmal atrial fibrillation (HCC) ICD-10-CM: I48.0 ICD-9-CM: 427.31  10/27/2015 - Present Yes Aortic stenosis, mild to moderate ICD-10-CM: I35.0 ICD-9-CM: 424.1  10/27/2015 - Present Yes Osteoarthritis (Chronic) ICD-10-CM: M19.90 ICD-9-CM: 715.90  2/12/2015 - Present Yes Overview Signed 10/27/2015  9:58 AM by Soham Ro With severe chronic low back pain. Chronic back pain (Chronic) ICD-10-CM: M54.9, G89.29 ICD-9-CM: 724.5, 338.29  2/12/2015 - Present Yes Obesity (Chronic) ICD-10-CM: K70.4 ICD-9-CM: 278.00  8/17/2012 - Present Yes Coronary artery disease (Chronic) ICD-10-CM: I25.10 ICD-9-CM: 414.00  8/17/2012 - Present Yes Overview Signed 8/17/2012  3:57 PM by Dick Mahajan Diagnosed 1999, CABG 12/2011 Cardiomyopathy, ischemic (Chronic) ICD-10-CM: I25.5 ICD-9-CM: 414.8  8/17/2012 - Present Yes Overview Signed 8/17/2012  3:58 PM by Dick Mahajan Pacemaker AICD 12/2011,  35% LUEF Factor 5 Leiden mutation, heterozygous ([facility] (Chronic) ICD-10-CM: T50.41 
ICD-9-CM: 289.81  8/17/2012 - Present Yes Overview Signed 8/17/2012  4:02 PM by Dick Mahajan Chronic anticoag., hx DVT Hyperlipidemia (Chronic) ICD-10-CM: N17.4 ICD-9-CM: 272.4  8/17/2012 - Present Yes PLAN:   
Acute hypoxic and hypercapnic resp failure- on bipap at night- now on nasal cannula 
pna- rocephin zithromax Copd exa- steroids Obesity Sub therapeutic inr- coumadin,lovenox. htn Cad DC planning/Dispo: DVT ppx:  coumadin Signed: 
Adriana John MD

## 2019-01-12 NOTE — PROGRESS NOTES
Problem: Mobility Impaired (Adult and Pediatric) Goal: *Acute Goals and Plan of Care (Insert Text) STG: 
(1.)Mr. Merline Sarmiento will move from supine to sit and sit to supine  with MINIMAL ASSIST within 3 treatment day(s). (2.)Mr. Merline Sarmiento will transfer from bed to chair and chair to bed with MODERATE ASSIST using the least restrictive device within 3 treatment day(s). LTG: 
(1.)Mr. Merline Sarmiento will move from supine to sit and sit to supine  in bed with STAND BY ASSIST within 5-7 treatment day(s). (2.)Mr. Merline Sarmiento will transfer from bed to chair and chair to bed with CONTACT GUARD ASSIST using the least restrictive device within 5-7 treatment day(s). (3.)Mr. Merline Sarmiento will ambulate with MIN TO MODERATE ASSIST for 15 feet with the least restrictive device within 5-7  treatment day(s). ________________________________________________________________________________________________ PHYSICAL THERAPY: Initial Assessment, Treatment Day: Day of Assessment, AM 1/12/2019INPATIENT: Hospital Day: 3 Payor: [employer] / Plan: [address] / Product Type: [employer] Medicare /  
  
NAME/AGE/GENDER: Dolores Mccoy is a 77 y.o. male PRIMARY DIAGNOSIS: PNA (pneumonia) [J18.9] Hypoxia [R09.02] Hyperkalemia [E87.5] Respiratory failure with hypercapnia (HCC) [J96.92] PNA (pneumonia) PNA (pneumonia) ICD-10: Treatment Diagnosis:  
 · Generalized Muscle Weakness (M62.81) · Other abnormalities of gait and mobility (R26.89) Precaution/Allergies: 
Patient has no known allergies. ASSESSMENT:  
Mr. Merline Sarmiento presents with weakness, decreased mobility, decreased transfers. Patient reports a history of falls and decreased mobility, but is not a good historian and family not present. Patient did fairly well getting into and out of bed with some help. Attempted with mod A x 2 to stand at edge of bed.  Patient was able to stand 5-10 sec at a time with RW, but knees were shaking/buckling as he was standing each time. Patient was not safe to try to transfer to chair. Patient had no complaints during activity. Patient is very impulsive and was given cues to slow down. Patient would benefit from PT to improve strength, independence, and tolerance of mobility. He will most likely need some SNF placement at discharge for further rehab. This section established at most recent assessment PROBLEM LIST (Impairments causing functional limitations): 1. Decreased Strength 2. Decreased ADL/Functional Activities 3. Decreased Transfer Abilities 4. Decreased Ambulation Ability/Technique 5. Decreased Balance 6. Decreased Activity Tolerance 7. Increased Shortness of Breath 8. Decreased [place] with Home Exercise Program 
 INTERVENTIONS PLANNED: (Benefits and precautions of physical therapy have been discussed with the patient.) 1. Balance Exercise 2. Bed Mobility 3. Gait Training 4. Home Exercise Program (HEP) 5. Therapeutic Activites 6. Therapeutic Exercise/Strengthening 7. Transfer Training TREATMENT PLAN: Frequency/Duration: daily for duration of hospital stay Rehabilitation Potential For Stated Goals: Fair RECOMMENDED REHABILITATION/EQUIPMENT: (at time of discharge pending progress): Due to the probability of continued deficits (see above) this patient will likely need continued skilled physical therapy after discharge. Equipment:  
? Walkers, Type: Rolling Walker; Will need further assessment as PT continues HISTORY:  
History of Present Injury/Illness (Reason for Referral): 
Patient states he has a history of falls; sometimes he can walk and sometimes he can't. Patient is not a good historian. Family not present during evaluation.  
PER MD NOTES: 
 \"77year-old male history of coronary artery disease, ischemic cardipmyopathy, hypertension, factor V Leiden, CABG, AICD for nonsustained V. tach, phlebitis, DVT, paroxysmal atrial fibrillation, aortic stenosis presents from his primary care office for pneumonia. Pt does have chronic sob on exertion since cabg- several years ago. Since past 2 weeks- nasal congestion,increased sob-- no fever or headache or dizziness or chest pain. Went to pcp today for his INR check- daughter complained that pt was looking ill- wanted pcp to evaluate- found to be hypoxic - oxygen sat 83%- was given breathing treatment- was told that has pneumonia- sent to er for further evaluation. In er found to have oxygen saturation 89- cxr- cardiomegaly with infiltrate- started in rocephin and zithromax. Mild elevated d-dimer- ordered ct chest with contrast - pending. Pt will be admitted for acute on chronic sob-secondary to pneumonia. \" 
 
 
Past Medical History/Comorbidities:  
Mr. Buzz Ovalle  has a past medical history of AICD (automatic cardioverter/defibrillator) present, Aortic stenosis, mild to moderate, Cardiomyopathy, ischemic, Carotid occlusion, bilateral, Chronic back pain, Chronic rhinitis, Chronic systolic congestive heart failure ([address] Coronary artery disease, DVT, lower extremity, recurrent ([address] Factor 5 Leiden mutation, heterozygous ([address] Heart attack ([address] History of alcoholism ([address] History of complete eye exam, smoking, Hyperlipidemia, Hypertension, NSVT (nonsustained ventricular tachycardia) ([address] Obesity, Osteoarthritis, Pacemaker, Paroxysmal atrial fibrillation ([address] Phlebitis and thrombophlebitis, S/P total knee arthroplasty, Tobacco abuse, and Wears dentures. Mr. Buzz Ovalle  has a past surgical history that includes hx cataract removal; hx lipoma resection (1998); hx angioplasty (1999); hx implantable cardioverter defibrillator (12/18/2011); hx coronary artery bypass graft (x 3, 12/2011); hx orthopaedic (Left); hx lymph node dissection; and LEFT KNEE ARTHROPLASTY TOTAL / Greta Camacho / [facility] (Left, 2/26/2015). Social History/Living Environment: has a daughter; unclear about living situation. Prior Level of Function/Work/Activity: 
Patient says he was able to ambulate but has history of falls. Dominant Side:  
      RIGHT Personal Factors:   
      Sex:  male Age:  77 y.o. Number of Personal Factors/Comorbidities that affect the Plan of Care: 1-2: MODERATE COMPLEXITY EXAMINATION:  
Most Recent Physical Functioning:  
Gross Assessment: 
AROM: Generally decreased, functional 
Strength: Generally decreased, functional 
Coordination: Generally decreased, functional 
Sensation: Impaired Posture: 
Posture (WDL): Exceptions to [facility] Posture Assessment: Forward head, Rounded shoulders Balance: 
Sitting: Intact; With support Standing: Impaired;Pull to stand; With support Standing - Static: Constant support;Poor Bed Mobility: 
Rolling: Minimum assistance Supine to Sit: Minimum assistance; Moderate assistance Sit to Supine: Minimum assistance; Moderate assistance Wheelchair Mobility: 
  
Transfers: 
Sit to Stand: Moderate assistance;Assist x2 Stand to Sit: Moderate assistance;Assist x2 Bed to Chair: (transfers not safe at this time.) Gait:Patient unable to ambulate at this time. Body Structures Involved: 1. Nerves 2. Heart 3. Lungs 4. Joints 5. Muscles Body Functions Affected: 1. Sensory/Pain 2. Neuromusculoskeletal 
3. Movement Related Activities and Participation Affected: 1. General Tasks and Demands 2. Mobility 3. Self Care 4. Domestic Life 5. Community, Social and [place] Number of elements that affect the Plan of Care: 3: MODERATE COMPLEXITY CLINICAL PRESENTATION:  
Presentation: Evolving clinical presentation with changing clinical characteristics: MODERATE COMPLEXITY CLINICAL DECISION MAKIN [facility] [address] [address] AM-PAC 6 Clicks Basic Mobility Inpatient Short Form How much difficulty does the patient currently have. .. Unable A Lot A Little None 1.  Turning over in bed (including adjusting bedclothes, sheets and blankets)? [] 1   [x] 2   [] 3   [] 4  
2. Sitting down on and standing up from a chair with arms ( e.g., wheelchair, bedside commode, etc.)   [] 1   [x] 2   [] 3   [] 4  
3. Moving from lying on back to sitting on the side of the bed? [] 1   [x] 2   [] 3   [] 4 How much help from another person does the patient currently need. .. Total A Lot A Little None 4. Moving to and from a bed to a chair (including a wheelchair)? [x] 1   [] 2   [] 3   [] 4  
5. Need to walk in hospital room? [x] 1   [] 2   [] 3   [] 4  
6. Climbing 3-5 steps with a railing? [x] 1   [] 2   [] 3   [] 4  
© 2007, Trustees of [address] [place] [address], under license to [employer]. All rights reserved Score:  Initial: 9 Most Recent: X (Date: -- ) Interpretation of Tool:  Represents activities that are increasingly more difficult (i.e. Bed mobility, Transfers, Gait). Score 24 23 22-20 19-15 14-10 9-7 6 Modifier CH CI CJ CK CL CM CN   
 
? Mobility - Walking and Moving Around:  
  - CURRENT STATUS: CM - 80%-99% impaired, limited or restricted  - GOAL STATUS: CL - 60%-79% impaired, limited or restricted  - D/C STATUS:  ---------------To be determined--------------- Payor: [place] [employer] MEDICARE / Plan: [address] / Product Type: Managed Care Medicare /   
 
Medical Necessity:    
· Patient is expected to demonstrate progress in strength, balance and functional technique to increase independence with mobility and improve safety during mobility. Reason for Services/Other Comments: 
· Patient continues to demonstrate capacity to improve strength, mobility which will increase independence and increase safety.   
Use of outcome tool(s) and clinical judgement create a POC that gives a: Questionable prediction of patient's progress: MODERATE COMPLEXITY  
  
 
 
 
TREATMENT:  
 (In addition to Assessment/Re-Assessment sessions the following treatments were rendered) Pre-treatment Symptoms/Complaints:  weak Pain: Initial: 0 Post Session:  0 Assessment/Reassessment only, no treatment provided today Braces/Orthotics/Lines/Etc:  
· dukes catheter · PICC 
· O2 Device: Hi flow nasal cannula, Humidifier Treatment/Session Assessment:   
· Response to Treatment:  Attempted sit to stand multiple times with RW and assist x 2, but patient was very shaky and unsafe. Patient tolerated session well, but was disappointed he couldn't get to the chair. · Interdisciplinary Collaboration:  
o Physical Therapist 
o Registered Nurse 
o Rehabilitation Attendant · After treatment position/precautions:  
o Supine in bed 
o Bed/Chair-wheels locked 
o Bed in low position 
o Call light within reach 
o RN notified 
o Side rails x 3  
· Compliance with Program/Exercises: Will assess as treatment progresses · Recommendations/Intent for next treatment session: \"Next visit will focus on advancements to more challenging activities\". Total Treatment Duration: PT Patient Time In/Time Out Time In: 1130 Time Out: 1200 Binu Schmitz PT

## 2019-01-12 NOTE — PROGRESS NOTES
Critical Care Daily Progress Note: 1/12/2019 [facility] Admission Date: 1/10/2019 The patient's chart is reviewed and the patient is discussed with the staff. Patient is a 77 y.o.  male  evaluated at the request of Dr. [facility]. With  history of coronary artery disease, ischemic cardipmyopathy, hypertension, factor V Leiden, CABG, AICD for nonsustained V. tach, phlebitis, DVT, paroxysmal atrial fibrillation, aortic stenosis presents from his primary care office for pneumonia.  
Pt does have chronic sob on exertion since cabg- several years ago. Since past 2 weeks- nasal congestion,increased sob-- no fever or headache or dizziness or chest pain. Went to pcp1/10 for his INR check- daughter complained that pt was looking ill- wanted pcp to evaluate- found to be hypoxic - oxygen sat 83%- was given breathing treatment- was told that has pneumonia- sent to er for further evaluation. 
  
In er found to have oxygen saturation 89- cxr- cardiomegaly with infiltrate- started in rocephin and zithromax. Mild elevated d-dimer- ordered ct chest with contrast-neg for pe . Overnight he has been on bipap and was agitated . Given ativan and now unreponsive despite improved abgs 
  
 
 
    
Subjective:  
 
Sleeps with BIPAP now Has cough with yellow sputum but he says its normal for him for years Never had sleep study Current Facility-Administered Medications Medication Dose Route Frequency  methylPREDNISolone (PF) (SOLU-MEDROL) injection 40 mg  40 mg IntraVENous Q8H  
 azithromycin (ZITHROMAX) 500 mg in 0.9% sodium chloride (MBP/ADV) 250 mL  500 mg IntraVENous Q24H  cefTRIAXone (ROCEPHIN) 1 g in 0.9% sodium chloride (MBP/ADV) 50 mL  1 g IntraVENous Q24H  
 enoxaparin (LOVENOX) injection 40 mg  40 mg SubCUTAneous Q12H  nystatin (MYCOSTATIN) 100,000 unit/gram powder   Topical BID  influenza vaccine 2018-19 (6 mos+)(PF) (FLUARIX QUAD/FLULAVAL QUAD) injection 0.5 mL  0.5 mL IntraMUSCular PRIOR TO DISCHARGE  sodium chloride (NS) flush 10 mL  10 mL InterCATHeter Q8H  
 sodium chloride (NS) flush 10 mL  10 mL InterCATHeter PRN  
 bisacodyl (DULCOLAX) tablet 10 mg  10 mg Oral QHS  docusate sodium (COLACE) capsule 400 mg  400 mg Oral QHS  sodium chloride (NS) flush 5-40 mL  5-40 mL IntraVENous Q8H  
 sodium chloride (NS) flush 5-40 mL  5-40 mL IntraVENous PRN  
 amLODIPine (NORVASC) tablet 10 mg  10 mg Oral DAILY  aspirin delayed-release tablet 81 mg  81 mg Oral DAILY  carvedilol (COREG) tablet 25 mg  25 mg Oral BID WITH MEALS  guaiFENesin ER (MUCINEX) tablet 1,200 mg  1,200 mg Oral BID  
 HYDROcodone-acetaminophen (NORCO)  mg tablet 1 Tab  1 Tab Oral Q6H PRN  
 lisinopril (PRINIVIL, ZESTRIL) tablet 20 mg  20 mg Oral DAILY  pravastatin (PRAVACHOL) tablet 40 mg  40 mg Oral QHS  sodium chloride (NS) flush 5-40 mL  5-40 mL IntraVENous Q8H  
 sodium chloride (NS) flush 5-40 mL  5-40 mL IntraVENous PRN  
 acetaminophen (TYLENOL) tablet 650 mg  650 mg Oral Q4H PRN  
 morphine injection 1 mg  1 mg IntraVENous Q4H PRN  
 naloxone (NARCAN) injection 0.4 mg  0.4 mg IntraVENous PRN  
 ondansetron (ZOFRAN) injection 4 mg  4 mg IntraVENous Q4H PRN  
 hydrALAZINE (APRESOLINE) 20 mg/mL injection 10 mg  10 mg IntraVENous Q6H PRN  
 albuterol-ipratropium (DUO-NEB) 2.5 MG-0.5 MG/3 ML  3 mL Nebulization Q4H RT  
 furosemide (LASIX) injection 40 mg  40 mg IntraVENous DAILY  loratadine (CLARITIN) tablet 10 mg  10 mg Oral DAILY  warfarin (COUMADIN) tablet 5 mg  5 mg Oral QPM  
 
 
Review of Systems Constitutional:  negative for fever, chills, sweats Cardiovascular:  negative for chest pain, palpitations, syncope, edema Gastrointestinal:  negative for dysphagia, reflux, vomiting, diarrhea, abdominal pain, or melena Neurologic:  negative for focal weakness, numbness, headache Objective:  
 
Vitals: 01/12/19 0519 01/12/19 0600 01/12/19 0615 01/12/19 4424 BP:  142/77 Pulse:  91    
Resp:  25 Temp:      
SpO2:  97% 94% 95% Weight: 278 lb 14.1 oz (126.5 kg) Intake and Output:  
01/10 1901 - 01/12 0700 In: 300 [I.V.:300] Out: Luke Kota [[id number]] No intake/output data recorded. Physical Exam:         
Constitutional:  the patient is well developed and in no acute distress EENMT:  Sclera clear, pupils equal, oral mucosa moist 
Respiratory: few crackles Cardiovascular:  RRR without M,G,R 
Gastrointestinal: soft and non-tender; with positive bowel sounds. Musculoskeletal: warm without cyanosis. There is no lower leg edema. Skin:  no jaundice or rashes, no wounds Neurologic: no gross neuro deficits Psychiatric:  alert and oriented x 3 LINES: 
Per iv line DRIPS: 
None CXR:  Pending LAB Recent Labs  
  01/10/19 
2214 GLUCPOC 185* Recent Labs  
  01/12/19 
0347 01/11/19 
0340 01/10/19 
1723 01/10/19 
1711 WBC 8.7 8.4  --  8.8 HGB 14.0 14.5  --  15.2 HCT 44.9 48.8  --  49.5  159  --  164 INR 1.2 1.5 1.6  --   
 
Recent Labs  
  01/12/19 
0347 01/11/19 
0340 01/10/19 
1711 * 133* 131* K 4.2 5.0 5.2*  
CL 91* 91* 91* CO2 38* 39* 39* * 147* 93 BUN 32* 27* 25* CREA 1.25 1.06 1.15  
CA 8.2* 8.2* 8.7 ALB  --   --  3.1* TBILI  --   --  0.5 ALT  --   --  20 SGOT  --   --  41* Recent Labs  
  01/11/19 
1148 01/11/19 
0523 01/11/19 
0025 PHI 7.386 7.265* 7.254* PCO2I 67.0* 85.3* 93.9*  
PO2I 57* 68* 72* HCO3I 40.2* 38.8* 41.6* Assessment:  (Medical Decision Making) Hospital Problems  Date Reviewed: 10/2/2018 Codes Class Noted POA  
 COPD exacerbation ([facility] ICD-10-CM: J44.1 ICD-9-CM: 491.21  1/11/2019 Unknown Hypoxia ICD-10-CM: R09.02 
ICD-9-CM: 799.02  1/10/2019 Unknown * (Principal) PNA (pneumonia) ICD-10-CM: J18.9 ICD-9-CM: 862  1/10/2019 Unknown Respiratory failure with hypercapnia ([facility] ICD-10-CM: J96.92 
ICD-9-CM: 518.81  1/10/2019 Unknown Essential hypertension ICD-10-CM: I10 
ICD-9-CM: 401.9  2/6/2017 Yes Chronic systolic congestive heart failure (HCC) ICD-10-CM: I50.22 ICD-9-CM: 428.22, 428.0  10/27/2015 Yes AICD (automatic cardioverter/defibrillator) present ICD-10-CM: Z95.810 ICD-9-CM: V45.02  10/27/2015 Yes Paroxysmal atrial fibrillation (HCC) ICD-10-CM: I48.0 ICD-9-CM: 427.31  10/27/2015 Yes Aortic stenosis, mild to moderate ICD-10-CM: I35.0 ICD-9-CM: 424.1  10/27/2015 Yes Osteoarthritis (Chronic) ICD-10-CM: M19.90 ICD-9-CM: 715.90  2/12/2015 Yes Overview Signed 10/27/2015  9:58 AM by Ubaldo Fleischer With severe chronic low back pain. Chronic back pain (Chronic) ICD-10-CM: M54.9, G89.29 ICD-9-CM: 724.5, 338.29  2/12/2015 Yes Obesity (Chronic) ICD-10-CM: Q25.2 ICD-9-CM: 278.00  8/17/2012 Yes Coronary artery disease (Chronic) ICD-10-CM: I25.10 ICD-9-CM: 414.00  8/17/2012 Yes Overview Signed 8/17/2012  3:57 PM by Angelina Kruse Diagnosed 1999, CABG 12/2011 Cardiomyopathy, ischemic (Chronic) ICD-10-CM: I25.5 ICD-9-CM: 414.8  8/17/2012 Yes Overview Signed 8/17/2012  3:58 PM by Angelina Kruse Pacemaker AICD 12/2011,  35% LUEF Factor 5 Leiden mutation, heterozygous ([facility] (Chronic) ICD-10-CM: B07.23 
ICD-9-CM: 289.81  8/17/2012 Yes Overview Signed 8/17/2012  4:02 PM by Angelina Kruse Chronic anticoag., hx DVT Hyperlipidemia (Chronic) ICD-10-CM: J08.2 ICD-9-CM: 272.4  8/17/2012 Yes Plan:  (Medical Decision Making) -- continue lasix, steroids, nebs, ABX 
- BIPAP at night, will need  NIPPV at night at home 
- PT More than 50% of the time documented was spent in face-to-face contact with the patient and in the care of the patient on the floor/unit where the patient is located.  
 
Michelle Mckeon MD

## 2019-01-12 NOTE — PROGRESS NOTES
Interdisciplinary team rounds were held 1/12/2019 with the following team members:Care Management, Nursing, Occupational Therapy, Pharmacy and House Supervisor and the patient and child(gloria). Plan of care discussed. See clinical pathway and/or care plan for interventions and desired outcomes.

## 2019-01-13 LAB
ANION GAP SERPL CALC-SCNC: 8 MMOL/L
BUN SERPL-MCNC: 35 MG/DL (ref 8–23)
CALCIUM SERPL-MCNC: 8.6 MG/DL (ref 8.3–10.4)
CHLORIDE SERPL-SCNC: 98 MMOL/L (ref 98–107)
CO2 SERPL-SCNC: 31 MMOL/L (ref 21–32)
CREAT SERPL-MCNC: 1.27 MG/DL (ref 0.8–1.5)
GLUCOSE SERPL-MCNC: 164 MG/DL (ref 65–100)
INR PPP: 1.2
POTASSIUM SERPL-SCNC: 3.8 MMOL/L (ref 3.5–5.1)
PROTHROMBIN TIME: 15.2 SEC (ref 11.7–14.5)
SODIUM SERPL-SCNC: 137 MMOL/L (ref 136–145)

## 2019-01-13 PROCEDURE — 74011000258 HC RX REV CODE- 258: Performed by: INTERNAL MEDICINE

## 2019-01-13 PROCEDURE — 80048 BASIC METABOLIC PNL TOTAL CA: CPT

## 2019-01-13 PROCEDURE — 99233 SBSQ HOSP IP/OBS HIGH 50: CPT | Performed by: INTERNAL MEDICINE

## 2019-01-13 PROCEDURE — 74011250636 HC RX REV CODE- 250/636: Performed by: INTERNAL MEDICINE

## 2019-01-13 PROCEDURE — 74011000250 HC RX REV CODE- 250: Performed by: INTERNAL MEDICINE

## 2019-01-13 PROCEDURE — 65610000001 HC ROOM ICU GENERAL

## 2019-01-13 PROCEDURE — 94640 AIRWAY INHALATION TREATMENT: CPT

## 2019-01-13 PROCEDURE — 74011250637 HC RX REV CODE- 250/637: Performed by: FAMILY MEDICINE

## 2019-01-13 PROCEDURE — 36415 COLL VENOUS BLD VENIPUNCTURE: CPT

## 2019-01-13 PROCEDURE — 74011250637 HC RX REV CODE- 250/637: Performed by: INTERNAL MEDICINE

## 2019-01-13 PROCEDURE — 77030019605

## 2019-01-13 PROCEDURE — 85610 PROTHROMBIN TIME: CPT

## 2019-01-13 PROCEDURE — 77010033711 HC HIGH FLOW OXYGEN

## 2019-01-13 PROCEDURE — 97110 THERAPEUTIC EXERCISES: CPT

## 2019-01-13 RX ORDER — ENOXAPARIN SODIUM 100 MG/ML
40 INJECTION SUBCUTANEOUS EVERY 24 HOURS
Status: DISCONTINUED | OUTPATIENT
Start: 2019-01-14 | End: 2019-01-15

## 2019-01-13 RX ORDER — IPRATROPIUM BROMIDE AND ALBUTEROL SULFATE 2.5; .5 MG/3ML; MG/3ML
3 SOLUTION RESPIRATORY (INHALATION)
Status: DISCONTINUED | OUTPATIENT
Start: 2019-01-14 | End: 2019-01-15

## 2019-01-13 RX ADMIN — Medication 10 ML: at 21:01

## 2019-01-13 RX ADMIN — ENOXAPARIN SODIUM 40 MG: 40 INJECTION SUBCUTANEOUS at 09:50

## 2019-01-13 RX ADMIN — METHYLPREDNISOLONE SODIUM SUCCINATE 40 MG: 40 INJECTION, POWDER, FOR SOLUTION INTRAMUSCULAR; INTRAVENOUS at 21:01

## 2019-01-13 RX ADMIN — GUAIFENESIN 1200 MG: 600 TABLET, EXTENDED RELEASE ORAL at 09:56

## 2019-01-13 RX ADMIN — LACTULOSE 30 G: 20 SOLUTION ORAL at 09:56

## 2019-01-13 RX ADMIN — BISACODYL 10 MG: 5 TABLET, COATED ORAL at 20:52

## 2019-01-13 RX ADMIN — CEFTRIAXONE 1 G: 1 INJECTION, POWDER, FOR SOLUTION INTRAMUSCULAR; INTRAVENOUS at 19:37

## 2019-01-13 RX ADMIN — AZITHROMYCIN MONOHYDRATE 500 MG: 500 INJECTION, POWDER, LYOPHILIZED, FOR SOLUTION INTRAVENOUS at 19:37

## 2019-01-13 RX ADMIN — LISINOPRIL 20 MG: 20 TABLET ORAL at 09:56

## 2019-01-13 RX ADMIN — CALCIUM CARBONATE 200 MG: 500 TABLET, CHEWABLE ORAL at 17:21

## 2019-01-13 RX ADMIN — Medication 10 ML: at 06:31

## 2019-01-13 RX ADMIN — CALCIUM CARBONATE 200 MG: 500 TABLET, CHEWABLE ORAL at 11:58

## 2019-01-13 RX ADMIN — IPRATROPIUM BROMIDE AND ALBUTEROL SULFATE 3 ML: .5; 3 SOLUTION RESPIRATORY (INHALATION) at 11:24

## 2019-01-13 RX ADMIN — WARFARIN SODIUM 5 MG: 5 TABLET ORAL at 17:21

## 2019-01-13 RX ADMIN — LORATADINE 10 MG: 10 TABLET ORAL at 17:21

## 2019-01-13 RX ADMIN — PANTOPRAZOLE SODIUM 40 MG: 40 TABLET, DELAYED RELEASE ORAL at 07:55

## 2019-01-13 RX ADMIN — AMLODIPINE BESYLATE 10 MG: 10 TABLET ORAL at 09:56

## 2019-01-13 RX ADMIN — NYSTATIN: 100000 POWDER TOPICAL at 09:50

## 2019-01-13 RX ADMIN — PRAVASTATIN SODIUM 40 MG: 20 TABLET ORAL at 21:01

## 2019-01-13 RX ADMIN — CARVEDILOL 25 MG: 25 TABLET, FILM COATED ORAL at 17:21

## 2019-01-13 RX ADMIN — METHYLPREDNISOLONE SODIUM SUCCINATE 40 MG: 40 INJECTION, POWDER, FOR SOLUTION INTRAMUSCULAR; INTRAVENOUS at 13:44

## 2019-01-13 RX ADMIN — DOCUSATE SODIUM 400 MG: 100 CAPSULE, LIQUID FILLED ORAL at 20:52

## 2019-01-13 RX ADMIN — IPRATROPIUM BROMIDE AND ALBUTEROL SULFATE 3 ML: .5; 3 SOLUTION RESPIRATORY (INHALATION) at 07:27

## 2019-01-13 RX ADMIN — ACETAZOLAMIDE 500 MG: 500 CAPSULE, EXTENDED RELEASE ORAL at 20:52

## 2019-01-13 RX ADMIN — CARVEDILOL 25 MG: 25 TABLET, FILM COATED ORAL at 07:55

## 2019-01-13 RX ADMIN — ACETAZOLAMIDE 500 MG: 500 CAPSULE, EXTENDED RELEASE ORAL at 14:00

## 2019-01-13 RX ADMIN — NYSTATIN: 100000 POWDER TOPICAL at 17:22

## 2019-01-13 RX ADMIN — IPRATROPIUM BROMIDE AND ALBUTEROL SULFATE 3 ML: .5; 3 SOLUTION RESPIRATORY (INHALATION) at 04:07

## 2019-01-13 RX ADMIN — CALCIUM CARBONATE 200 MG: 500 TABLET, CHEWABLE ORAL at 07:55

## 2019-01-13 RX ADMIN — IPRATROPIUM BROMIDE AND ALBUTEROL SULFATE 3 ML: .5; 3 SOLUTION RESPIRATORY (INHALATION) at 19:15

## 2019-01-13 RX ADMIN — GUAIFENESIN 1200 MG: 600 TABLET, EXTENDED RELEASE ORAL at 20:52

## 2019-01-13 RX ADMIN — ASPIRIN 81 MG: 81 TABLET, COATED ORAL at 09:56

## 2019-01-13 RX ADMIN — IPRATROPIUM BROMIDE AND ALBUTEROL SULFATE 3 ML: .5; 3 SOLUTION RESPIRATORY (INHALATION) at 15:47

## 2019-01-13 RX ADMIN — Medication 10 ML: at 13:44

## 2019-01-13 RX ADMIN — METHYLPREDNISOLONE SODIUM SUCCINATE 40 MG: 40 INJECTION, POWDER, FOR SOLUTION INTRAMUSCULAR; INTRAVENOUS at 06:30

## 2019-01-13 NOTE — PROGRESS NOTES
Bedside report given to Angie Soria, [facility]. Pt alert and oriented, denies needs at this time 
vss on monitor.

## 2019-01-13 NOTE — PROGRESS NOTES
Problem: Self Care Deficits Care Plan (Adult) Goal: *Acute Goals and Plan of Care (Insert Text) 1. Patient will perform grooming with stand by assistance seated edge of bed. 2. Patient will perform Upper body dressing with minimal assistance seated edge of bed. 3. Patient will perform upper body bathing with minimal assistance seated edge of bed. 4. Patient will participate in 30 + minutes of ADL/ therapeutic exercise/therapeutic activity with min rest breaks to increase activity tolerance for self care. Goals to be achieved in 7 days. Mobility goals to be set as mobility is further assessed. OCCUPATIONAL THERAPY: Initial Assessment 1/12/2019INPATIENT: Hospital Day: 3 Payor: Erlin Murcia / Plan: [employer] / Product Type: Managed Care Medicare /  
  
NAME/AGE/GENDER: Arian Kulkarni is a 77 y.o. male PRIMARY DIAGNOSIS:  PNA (pneumonia) [J18.9] Hypoxia [R09.02] Hyperkalemia [E87.5] Respiratory failure with hypercapnia (HCC) [J96.92] PNA (pneumonia) PNA (pneumonia) ICD-10: Treatment Diagnosis:  
 · Generalized Muscle Weakness (M62.81) Precautions/Allergies: 
   Patient has no known allergies. ASSESSMENT:  
Mr. Alexsander Gomez presents with above diagnosis and was noted to have significant generalized weakness. Pt noted he fell getting in/out tub shower combo 6 months ago and suffered a wound to his leg that required home health care and the wound center to get it healed. In that time his became sedentary and with decreased ability to move and do for himself. There was a woman in the room that stated she was his daughter and lived in the apartment below him. .. Unsure of this authenticity of this relationship. She does seem to care for him and says she is willing to help. He will, however, need short term rehab post discharge due to significant debility.  Pt will benefit from OT while in the hospital to start the process of increased independence and safety with basic self care. This section established at most recent assessment PROBLEM LIST (Impairments causing functional limitations): 1. Decreased Strength 2. Decreased ADL/Functional Activities 3. Decreased Transfer Abilities 4. Decreased Ambulation Ability/Technique 5. Decreased Balance 6. Decreased Activity Tolerance INTERVENTIONS PLANNED: (Benefits and precautions of occupational therapy have been discussed with the patient.) 1. Activities of daily living training 2. Adaptive equipment training 3. Balance training 4. Therapeutic activity 5. Therapeutic exercise TREATMENT PLAN: Frequency/Duration: Follow patient 3 times per week to address above goals. Rehabilitation Potential For Stated Goals: Good RECOMMENDED REHABILITATION/EQUIPMENT: (at time of discharge pending progress): Due to the probability of continued deficits (see above) this patient will likely need continued skilled occupational therapy after discharge. Equipment:  
? None at this time OCCUPATIONAL PROFILE AND HISTORY:  
History of Present Injury/Illness (Reason for Referral): 
weakness Past Medical History/Comorbidities:  
Mr. Buzz Ovalle  has a past medical history of AICD (automatic cardioverter/defibrillator) present, Aortic stenosis, mild to moderate, Cardiomyopathy, ischemic, Carotid occlusion, bilateral, Chronic back pain, Chronic rhinitis, Chronic systolic congestive heart failure ([address] Coronary artery disease, DVT, lower extremity, recurrent ([address] Factor 5 Leiden mutation, heterozygous ([address] Heart attack ([address] History of alcoholism ([address] History of complete eye exam, smoking, Hyperlipidemia, Hypertension, NSVT (nonsustained ventricular tachycardia) ([address] Obesity, Osteoarthritis, Pacemaker, Paroxysmal atrial fibrillation ([address] Phlebitis and thrombophlebitis, S/P total knee arthroplasty, Tobacco abuse, and Wears dentures. Mr. Shayla Alston  has a past surgical history that includes hx cataract removal; hx lipoma resection (1998); hx angioplasty (1999); hx implantable cardioverter defibrillator (12/18/2011); hx coronary artery bypass graft (x 3, 12/2011); hx orthopaedic (Left); hx lymph node dissection; and LEFT KNEE ARTHROPLASTY TOTAL / [facility] Benes / [facility] (Left, 2/26/2015). Social History/Living Environment:  
Home Environment: Apartment # Steps to Enter: 14(to get into apartment ) Rails to Enter: Yes One/Two Story Residence: One story(apartment ) Support Systems: (woman presents who states she is his daughter) Prior Level of Function/Work/Activity: 
Independent prior to a fall 6 month ago. Number of Personal Factors/Comorbidities that affect the Plan of Care: Expanded review of therapy/medical records (1-2):  MODERATE COMPLEXITY ASSESSMENT OF OCCUPATIONAL PERFORMANCE[de-identified]  
Activities of Daily Living:  
Basic ADLs (From Assessment) Complex ADLs (From Assessment) Feeding: Independent Oral Facial Hygiene/Grooming: Minimum assistance Bathing: Maximum assistance Upper Body Dressing: Moderate assistance Lower Body Dressing: Total assistance Toileting: Total assistance(bed pan, urinal) Grooming/Bathing/Dressing Activities of Daily Living Cognitive Retraining Safety/Judgement: Awareness of environment Functional Transfers Bathroom Mobility: (to be assessed ) Toilet Transfer : (assess to [facility]) Bed/Mat Mobility Rolling: Minimum assistance Supine to Sit: Minimum assistance; Moderate assistance Sit to Supine: Minimum assistance; Moderate assistance Sit to Stand: Moderate assistance;Assist x2 Bed to Chair: (transfers not safe at this time.) Most Recent Physical Functioning:  
Gross Assessment: 
  
         
  
Posture: 
Posture (WDL): Exceptions to [facility] Posture Assessment: Forward head, Rounded shoulders Balance: 
Sitting: Intact; With support Standing: Impaired;Pull to stand; With support Standing - Static: Constant support;Poor Bed Mobility: 
Rolling: Minimum assistance Supine to Sit: Minimum assistance; Moderate assistance Sit to Supine: Minimum assistance; Moderate assistance Wheelchair Mobility: 
  
Transfers: 
Sit to Stand: Moderate assistance;Assist x2 Stand to Sit: Moderate assistance;Assist x2 Bed to Chair: (transfers not safe at this time.) Patient Vitals for the past 6 hrs: 
 BP SpO2 O2 Flow Rate (L/min) Pulse 19 1401 127/57 96 %  86  
19 1500  92 %  80  
19 1501 92/54     
19 1519  96 % 8 l/min   
19 1601 101/56 92 %  70  
19 1700 99/49   64  
19 1701 91/54 92 %  74  
19 1801 107/57   69  
19 1802  100 %   Mental Status Neurologic State: Alert Orientation Level: Oriented X4 Cognition: Appropriate decision making Perception: Appears intact Perseveration: No perseveration noted Safety/Judgement: Awareness of environment Physical Skills Involved: 
1. Balance 2. Strength 3. Activity Tolerance Cognitive Skills Affected (resulting in the inability to perform in a timely and safe manner): 1. maikel Psychosocial Skills Affected: 1. Environmental Adaptation Number of elements that affect the Plan of Care: 3-5:  MODERATE COMPLEXITY CLINICAL DECISION MAKIN [facility] [address] [address] AM-PAC 6 Clicks Daily Activity Inpatient Short Form How much help from another person does the patient currently need. .. Total A Lot A Little None 1. Putting on and taking off regular lower body clothing? [x] 1   [] 2   [] 3   [] 4  
2. Bathing (including washing, rinsing, drying)? [] 1   [x] 2   [] 3   [] 4  
3. Toileting, which includes using toilet, bedpan or urinal?   [] 1   [x] 2   [] 3   [] 4  
4. Putting on and taking off regular upper body clothing?    [] 1   [x] 2   [] 3   [] 4  
 5. Taking care of personal grooming such as brushing teeth? [] 1   [] 2   [x] 3   [] 4  
6. Eating meals? [] 1   [] 2   [] 3   [x] 4  
© 2007, Trustees of [facility] [employer], under license to [employer]. All rights reserved Score:  Initial: 14 Most Recent: X (Date: -- ) Interpretation of Tool:  Represents activities that are increasingly more difficult (i.e. Bed mobility, Transfers, Gait). Score 24 23 22-20 19-15 14-10 9-7 6 Modifier CH CI CJ CK CL CM CN   
 
? Self Care:  
  - CURRENT STATUS: CL - 60%-79% impaired, limited or restricted  - GOAL STATUS: CK - 40%-59% impaired, limited or restricted  - D/C STATUS:  ---------------To be determined--------------- Payor: [employer] MEDICARE / Plan: [employer] [address] / Product Type: Managed Care Medicare /   
 
Medical Necessity:    
· Patient is expected to demonstrate progress in strength, balance, coordination and functional technique to increase independence with self care. Reason for Services/Other Comments: 
· Patient would benefit from OT to maximize safety and independence with self care and functional mobility . Use of outcome tool(s) and clinical judgement create a POC that gives a: MODERATE COMPLEXITY  
 
 
 
TREATMENT:  
(In addition to Assessment/Re-Assessment sessions the following treatments were rendered) Pre-treatment Symptoms/Complaints:   
Pain: Initial:  
Pain Intensity 1: 0 0 Post Session:  0 Assessment/Reassessment only, no treatment provided today Braces/Orthotics/Lines/Etc:  
· IV 
· ICU monitors · O2 Device: Hi flow nasal cannula, Humidifier Treatment/Session Assessment:   
· Response to Treatment:  Pt with good understanding of why he is in hospital and the therapy required to get back to his baseline · Interdisciplinary Collaboration:  
o Occupational Therapist 
o Registered Nurse · After treatment position/precautions:  
o Supine in bed o Bed/Chair-wheels locked 
o Bed in low position 
o Caregiver at bedside 
o Call light within reach 
o RN notified · Compliance with Program/Exercises: Compliant all of the time, Will assess as treatment progresses. · Recommendations/Intent for next treatment session: \"Next visit will focus on reduction in assistance provided\". Total Treatment Duration: OT Patient Time In/Time Out Time In: 8096 Time Out: 7110 Naty John OT

## 2019-01-13 NOTE — PROGRESS NOTES
Patient assessment complete. Patient is pleasant and enjoys communicating. Next of Kin discussion: 
Patient states his next of kin is his brother in [facility]. He has 4 living siblings, 3 of which are experiencing various serious declinations of health. He has no living spouse and never fathered any children. He has a close friendship with Patsy Blanca, who helps him with meals, meds, ADL's, shopping, doctor visits, and anything else that he needs. Patient explained that the patient proposed to him that she would be his caregiver for the remainder of his life as long as she could call him \"dad\" and he would call her \"daughter\". Patient states she has been an excellent caregiver and that he is enjoying having someone who is like an actual daughter to him. As of this time, his  is his beneficiary, his brother is his next of kin and decision maker, and Jonathan Lindo is not legally connected to him in anyway. He states he plans to seek the services of a  once he is out of the hospital and make Bernetta his HCPOA, legal POA, and beneficiary.

## 2019-01-13 NOTE — PROGRESS NOTES
Patient states he is unable to have bowel movement. He thinks he needs to be disimpacted. Digitally checked for impaction and the patient does not have any stool in the rectal vault.

## 2019-01-13 NOTE — PROGRESS NOTES
Interdisciplinary team rounds were held 1/13/2019 with the following team members:Care Management, Nursing, Physician and Respiratory Therapy and the patient. Plan of care discussed. See clinical pathway and/or care plan for interventions and desired outcomes.

## 2019-01-13 NOTE — PROGRESS NOTES
Problem: Mobility Impaired (Adult and Pediatric) Goal: *Acute Goals and Plan of Care (Insert Text) STG: 
(1.)Mr. Yasir Cameron will move from supine to sit and sit to supine  with MINIMAL ASSIST within 3 treatment day(s). (2.)Mr. Yasir Cameron will transfer from bed to chair and chair to bed with MODERATE ASSIST using the least restrictive device within 3 treatment day(s). LTG: 
(1.)Mr. Yasir Cameron will move from supine to sit and sit to supine  in bed with STAND BY ASSIST within 5-7 treatment day(s). (2.)Mr. Yasir Cameron will transfer from bed to chair and chair to bed with CONTACT GUARD ASSIST using the least restrictive device within 5-7 treatment day(s). (3.)Mr. Yasir Cameron will ambulate with MIN TO MODERATE ASSIST for 15 feet with the least restrictive device within 5-7  treatment day(s). ________________________________________________________________________________________________ PHYSICAL THERAPY: Daily Note, Treatment Day: 1st, PM 1/13/2019INPATIENT: Hospital Day: 4 Payor: [address] [place] [address],9D / Plan: [address] / Product Type: [employer] Medicare /  
  
NAME/AGE/GENDER: Franchesca Wells is a 77 y.o. male PRIMARY DIAGNOSIS: PNA (pneumonia) [J18.9] Hypoxia [R09.02] Hyperkalemia [E87.5] Respiratory failure with hypercapnia (HCC) [J96.92] PNA (pneumonia) PNA (pneumonia) ICD-10: Treatment Diagnosis:  
 · Generalized Muscle Weakness (M62.81) · Other abnormalities of gait and mobility (R26.89) Precaution/Allergies: 
Patient has no known allergies. ASSESSMENT:  
Mr. Yasir Cameron is sitting up in chair on arrival.  He states he has been to [facility] and over to chair. Exercises performed sitting up with time between each for fatigue. Sit to stand with close SBA. Pt states he feels much better today than he did yesterday. Pt left sitting up to eat lunch with needs and call bell in reach. This section established at most recent assessment PROBLEM LIST (Impairments causing functional limitations): 1. Decreased Strength 2. Decreased ADL/Functional Activities 3. Decreased Transfer Abilities 4. Decreased Ambulation Ability/Technique 5. Decreased Balance 6. Decreased Activity Tolerance 7. Increased Shortness of Breath 8. Decreased [place] with Home Exercise Program 
 INTERVENTIONS PLANNED: (Benefits and precautions of physical therapy have been discussed with the patient.) 1. Balance Exercise 2. Bed Mobility 3. Gait Training 4. Home Exercise Program (HEP) 5. Therapeutic Activites 6. Therapeutic Exercise/Strengthening 7. Transfer Training TREATMENT PLAN: Frequency/Duration: daily for duration of hospital stay Rehabilitation Potential For Stated Goals: Fair RECOMMENDED REHABILITATION/EQUIPMENT: (at time of discharge pending progress): Due to the probability of continued deficits (see above) this patient will likely need continued skilled physical therapy after discharge. Equipment:  
? Walkers, Type: Rolling Walker; Will need further assessment as PT continues HISTORY:  
History of Present Injury/Illness (Reason for Referral): 
Patient states he has a history of falls; sometimes he can walk and sometimes he can't. Patient is not a good historian. Family not present during evaluation. PER MD NOTES: 
 \"77year-old male history of coronary artery disease, ischemic cardipmyopathy, hypertension, factor V Leiden, CABG, AICD for nonsustained V. tach, phlebitis, DVT, paroxysmal atrial fibrillation, aortic stenosis presents from his primary care office for pneumonia. Pt does have chronic sob on exertion since cabg- several years ago. Since past 2 weeks- nasal congestion,increased sob-- no fever or headache or dizziness or chest pain.  
Went to pcp today for his INR check- daughter complained that pt was looking ill- wanted pcp to evaluate- found to be hypoxic - oxygen sat 83%- was given breathing treatment- was told that has pneumonia- sent to er for further evaluation. In er found to have oxygen saturation 89- cxr- cardiomegaly with infiltrate- started in rocephin and zithromax. Mild elevated d-dimer- ordered ct chest with contrast - pending. Pt will be admitted for acute on chronic sob-secondary to pneumonia. \" 
 
 
Past Medical History/Comorbidities:  
Mr. Antony Guevara  has a past medical history of AICD (automatic cardioverter/defibrillator) present, Aortic stenosis, mild to moderate, Cardiomyopathy, ischemic, Carotid occlusion, bilateral, Chronic back pain, Chronic rhinitis, Chronic systolic congestive heart failure ([address] Coronary artery disease, DVT, lower extremity, recurrent ([address] Factor 5 Leiden mutation, heterozygous ([address] Heart attack ([address] History of alcoholism ([address] History of complete eye exam, smoking, Hyperlipidemia, Hypertension, NSVT (nonsustained ventricular tachycardia) ([address] Obesity, Osteoarthritis, Pacemaker, Paroxysmal atrial fibrillation ([address] Phlebitis and thrombophlebitis, S/P total knee arthroplasty, Tobacco abuse, and Wears dentures. Mr. Antony Guevara  has a past surgical history that includes hx cataract removal; hx lipoma resection (1998); hx angioplasty (1999); hx implantable cardioverter defibrillator (12/18/2011); hx coronary artery bypass graft (x 3, 12/2011); hx orthopaedic (Left); hx lymph node dissection; and LEFT KNEE ARTHROPLASTY TOTAL / Pato Chidi / [facility] (Left, 2/26/2015). Social History/Living Environment:  
Home Environment: Apartment # Steps to Enter: 14 
Rails to Enter: Yes One/Two Story Residence: Two story(lives on second floor) Living Alone: Yes Support Systems: Friends \ neighbors Patient Expects to be Discharged to[de-identified] Rehabilitation facility Current DME Used/Available at Home: [employer], bedsidehas a daughter; unclear about living situation. Prior Level of Function/Work/Activity: 
Patient says he was able to ambulate but has history of falls. Dominant Side:  
      RIGHT Personal Factors:   
      Sex:  male Age:  77 y.o. Number of Personal Factors/Comorbidities that affect the Plan of Care: 1-2: MODERATE COMPLEXITY EXAMINATION:  
Most Recent Physical Functioning:  
Gross Assessment: 
  
         
  
Posture: 
  
Balance: 
  Bed Mobility: 
  
Wheelchair Mobility: 
  
Transfers: 
Sit to Stand: Stand-by assistance Stand to Sit: Stand-by assistance Gait:Patient unable to ambulate at this time. Body Structures Involved: 1. Nerves 2. Heart 3. Lungs 4. Joints 5. Muscles Body Functions Affected: 1. Sensory/Pain 2. Neuromusculoskeletal 
3. Movement Related Activities and Participation Affected: 1. General Tasks and Demands 2. Mobility 3. Self Care 4. Domestic Life 5. Community, Social and [place] Number of elements that affect the Plan of Care: 3: MODERATE COMPLEXITY CLINICAL PRESENTATION:  
Presentation: Evolving clinical presentation with changing clinical characteristics: MODERATE COMPLEXITY CLINICAL DECISION MAKING:  
[facility] AM-PAC 6 Clicks Basic Mobility Inpatient Short Form How much difficulty does the patient currently have. .. Unable A Lot A Little None 1. Turning over in bed (including adjusting bedclothes, sheets and blankets)? [] 1   [x] 2   [] 3   [] 4  
2. Sitting down on and standing up from a chair with arms ( e.g., wheelchair, bedside commode, etc.)   [] 1   [x] 2   [] 3   [] 4  
3. Moving from lying on back to sitting on the side of the bed? [] 1   [x] 2   [] 3   [] 4 How much help from another person does the patient currently need. .. Total A Lot A Little None 4. Moving to and from a bed to a chair (including a wheelchair)? [x] 1   [] 2   [] 3   [] 4  
5. Need to walk in hospital room? [x] 1   [] 2   [] 3   [] 4  
6. Climbing 3-5 steps with a railing? [x] 1   [] 2   [] 3   [] 4  
© 2007, Trustees of [facility], under license to [employer].  All rights reserved Score:  Initial: 9 Most Recent: X (Date: -- ) Interpretation of Tool:  Represents activities that are increasingly more difficult (i.e. Bed mobility, Transfers, Gait). Score 24 23 22-20 19-15 14-10 9-7 6 Modifier CH CI CJ CK CL CM CN   
 
? Mobility - Walking and Moving Around:  
  - CURRENT STATUS: CM - 80%-99% impaired, limited or restricted  - GOAL STATUS: CL - 60%-79% impaired, limited or restricted  - D/C STATUS:  ---------------To be determined--------------- Payor: [employer] MEDICARE / Plan: [address] / Product Type: [employer] Medicare /   
 
Medical Necessity:    
· Patient is expected to demonstrate progress in strength, balance and functional technique to increase independence with mobility and improve safety during mobility. Reason for Services/Other Comments: 
· Patient continues to demonstrate capacity to improve strength, mobility which will increase independence and increase safety. Use of outcome tool(s) and clinical judgement create a POC that gives a: Questionable prediction of patient's progress: MODERATE COMPLEXITY  
  
 
 
 
TREATMENT:  
(In addition to Assessment/Re-Assessment sessions the following treatments were rendered) Pre-treatment Symptoms/Complaints:  weak Pain: Initial: 0 Post Session:  0 Therapeutic Exercise: (25 Minutes):  Exercises per grid below to improve mobility, strength and balance. Required minimal verbal cues to promote proper body alignment, promote proper body posture and promote proper body mechanics. Progressed repetitions as indicated. Date: 
1/13/19 Date: 
 Date: Activity/Exercise Parameters Parameters Parameters Ankle pumps 15 B Quad sets 15 B     
glute sets 15 B Hip ABD/ADD 15 B Heel slides 15 B    
LAQs 15 B    
marching 15 B Sit to stand  x5 Braces/Orthotics/Lines/Etc:  
· dukes catheter · ICU lines, O2 via nasal cannula Treatment/Session Assessment:   
· Response to Treatment:  Attempted sit to stand multiple times with RW and assist x 2, but patient was very shaky and unsafe. Patient tolerated session well, but was disappointed he couldn't get to the chair. · Interdisciplinary Collaboration:  
o Registered Nurse · After treatment position/precautions:  
o Up in chair 
o Bed/Chair-wheels locked 
o Bed in low position 
o Call light within reach 
o RN notified · Compliance with Program/Exercises: Will assess as treatment progresses · Recommendations/Intent for next treatment session: \"Next visit will focus on advancements to more challenging activities\". Total Treatment Duration: PT Patient Time In/Time Out Time In: 1210 Time Out: 1235 S[address]ia Tirado, PTA

## 2019-01-13 NOTE — PROGRESS NOTES
Patient up to bedside commode twice today, sat in recliner between getting up to toilet. Patient 2 person assist with walker, unsteady gait. Patient had small BM x 1 earlier this am after lactulose, patient currently on [facility] and fixated on bowel movements. No BANG, VSS. NAD.

## 2019-01-13 NOTE — PROGRESS NOTES
Critical Care Daily Progress Note: 1/13/2019 Isamar Chain Admission Date: 1/10/2019 The patient's chart is reviewed and the patient is discussed with the staff. 
 
  
Patient is Z 52 y.o.  male  evaluated at the request of Dr. Kelle López. With  history of coronary artery disease, ischemic cardipmyopathy, hypertension, factor V Leiden, CABG, AICD for nonsustained V. tach, phlebitis, DVT, paroxysmal atrial fibrillation, aortic stenosis presents from his primary care office for pneumonia.  
Pt does have chronic sob on exertion since cabg- several years ago. Since past 2 weeks- nasal congestion,increased sob-- no fever or headache or dizziness or chest pain. Went to pcp1/10 for his INR check- daughter complained that pt was looking ill- wanted pcp to evaluate- found to be hypoxic - oxygen sat 83%- was given breathing treatment- was told that has pneumonia- sent to er for further evaluation. 
  
In er found to have oxygen saturation 89- cxr- cardiomegaly with infiltrate- started in rocephin and zithromax. Mild elevated d-dimer- ordered ct chest with contrast-neg for pe . Overnight he has been on bipap and was agitated . Surya Baez ativan and now unreponsive despite improved abgs Subjective: Worse BIPAP only 4 hours last night and then took it off  
-never had sleep study  
-also refused PT Current Facility-Administered Medications Medication Dose Route Frequency  acetaZOLAMIDE SR (DIAMOX) capsule 500 mg  500 mg Oral Q12H  pantoprazole (PROTONIX) tablet 40 mg  40 mg Oral ACB  calcium carbonate (TUMS) chewable tablet 200 mg [elemental]  200 mg Oral TID WITH MEALS  
 albuterol-ipratropium (DUO-NEB) 2.5 MG-0.5 MG/3 ML  3 mL Nebulization Q4H RT  
 methylPREDNISolone (PF) (SOLU-MEDROL) injection 40 mg  40 mg IntraVENous Q8H  
 azithromycin (ZITHROMAX) 500 mg in 0.9% sodium chloride (MBP/ADV) 250 mL  500 mg IntraVENous Q24H  cefTRIAXone (ROCEPHIN) 1 g in 0.9% sodium chloride (MBP/ADV) 50 mL  1 g IntraVENous Q24H  
 enoxaparin (LOVENOX) injection 40 mg  40 mg SubCUTAneous Q12H  nystatin (MYCOSTATIN) 100,000 unit/gram powder   Topical BID  influenza vaccine 2018-19 (6 mos+)(PF) (FLUARIX QUAD/FLULAVAL QUAD) injection 0.5 mL  0.5 mL IntraMUSCular PRIOR TO DISCHARGE  sodium chloride (NS) flush 10 mL  10 mL InterCATHeter Q8H  
 sodium chloride (NS) flush 10 mL  10 mL InterCATHeter PRN  
 bisacodyl (DULCOLAX) tablet 10 mg  10 mg Oral QHS  docusate sodium (COLACE) capsule 400 mg  400 mg Oral QHS  sodium chloride (NS) flush 5-40 mL  5-40 mL IntraVENous PRN  
 amLODIPine (NORVASC) tablet 10 mg  10 mg Oral DAILY  aspirin delayed-release tablet 81 mg  81 mg Oral DAILY  carvedilol (COREG) tablet 25 mg  25 mg Oral BID WITH MEALS  guaiFENesin ER (MUCINEX) tablet 1,200 mg  1,200 mg Oral BID  
 HYDROcodone-acetaminophen (NORCO)  mg tablet 1 Tab  1 Tab Oral Q6H PRN  
 lisinopril (PRINIVIL, ZESTRIL) tablet 20 mg  20 mg Oral DAILY  pravastatin (PRAVACHOL) tablet 40 mg  40 mg Oral QHS  sodium chloride (NS) flush 5-40 mL  5-40 mL IntraVENous PRN  
 acetaminophen (TYLENOL) tablet 650 mg  650 mg Oral Q4H PRN  
 morphine injection 1 mg  1 mg IntraVENous Q4H PRN  
 naloxone (NARCAN) injection 0.4 mg  0.4 mg IntraVENous PRN  
 ondansetron (ZOFRAN) injection 4 mg  4 mg IntraVENous Q4H PRN  
 hydrALAZINE (APRESOLINE) 20 mg/mL injection 10 mg  10 mg IntraVENous Q6H PRN  
 furosemide (LASIX) injection 40 mg  40 mg IntraVENous DAILY  loratadine (CLARITIN) tablet 10 mg  10 mg Oral DAILY  warfarin (COUMADIN) tablet 5 mg  5 mg Oral QPM  
 
 
Review of Systems Constitutional:  negative for fever, chills, sweats Cardiovascular:  negative for chest pain, palpitations, syncope, edema Gastrointestinal:  negative for dysphagia, reflux, vomiting, diarrhea, abdominal pain, or melena Neurologic:  negative for focal weakness, numbness, headache Objective:  
 
Vitals:  
 01/13/19 0201 01/13/19 0401 01/13/19 0408 01/13/19 0986 BP: 101/58 103/52  116/65 Pulse: 69 69  72 Resp: 20 27  (!) 32 Temp:  98.2 °F (36.8 °C) SpO2: 92% 91% 96% 93% Weight:      
 
 
Intake and Output:  
01/11 0701 - 01/12 1900 In: 109 [P.O.:595; I.V.:300] Out: 3025 [ULCWV:1865] 01/12 1901 - 01/13 0700 In: -  
Out: 2000 [BQYXY:5738] Physical Exam:         
Constitutional:  the patient is well developed and in no acute distress EENMT:  Sclera clear, pupils equal, oral mucosa moist 
Respiratory: few crackles Cardiovascular:  RRR without M,G,R 
Gastrointestinal: soft and non-tender; with positive bowel sounds. Musculoskeletal: warm without cyanosis. There is plus 1 lower leg edema. Skin:  no jaundice or rashes, no wounds Neurologic: no gross neuro deficits Psychiatric:  alert and oriented x 3 LINES: 
Per iv line DRIPS: 
None CXR:  None LAB Recent Labs  
  01/10/19 
2214 GLUCPOC 185* Recent Labs  
  01/13/19 
0341 01/12/19 
0347 01/11/19 
0340  01/10/19 
1711 WBC  --  8.7 8.4  --  8.8 HGB  --  14.0 14.5  --  15.2 HCT  --  44.9 48.8  --  49.5 PLT  --  165 159  --  164 INR 1.2 1.2 1.5   < >  --   
 < > = values in this interval not displayed. Recent Labs  
  01/13/19 
0341 01/12/19 
0347 01/11/19 
0340 01/10/19 
1711  134* 133* 131*  
K 3.8 4.2 5.0 5.2*  
CL 98 91* 91* 91* CO2 31 38* 39* 39* * 165* 147* 93 BUN 35* 32* 27* 25* CREA 1.27 1.25 1.06 1.15  
CA 8.6 8.2* 8.2* 8.7 ALB  --   --   --  3.1* TBILI  --   --   --  0.5 ALT  --   --   --  20 SGOT  --   --   --  41* Recent Labs  
  01/11/19 
1148 01/11/19 
0523 01/11/19 
0025 PHI 7.386 7.265* 7.254* PCO2I 67.0* 85.3* 93.9*  
PO2I 57* 68* 72* HCO3I 40.2* 38.8* 41.6* Assessment:  (Medical Decision Making) Hospital Problems  Date Reviewed: 10/2/2018 Codes Class Noted POA  
 COPD exacerbation ([facility] steroids ICD-10-CM: J44.1 ICD-9-CM: 491.21  1/11/2019 Unknown Hypoxia ICD-10-CM: R09.02 
ICD-9-CM: 799.02  1/10/2019 Unknown * (Principal) PNA (pneumonia) ICD-10-CM: J18.9 ICD-9-CM: 601  1/10/2019 Unknown Respiratory failure with hypercapnia ([facility] ICD-10-CM: J96.92 
ICD-9-CM: 518.81  1/10/2019 Unknown Essential hypertension ICD-10-CM: I10 
ICD-9-CM: 401.9  2/6/2017 Yes Chronic systolic congestive heart failure (HCC) ICD-10-CM: I50.22 ICD-9-CM: 428.22, 428.0  10/27/2015 Yes AICD (automatic cardioverter/defibrillator) present ICD-10-CM: Z95.810 ICD-9-CM: V45.02  10/27/2015 Yes Paroxysmal atrial fibrillation (HCC) ICD-10-CM: I48.0 ICD-9-CM: 427.31  10/27/2015 Yes Aortic stenosis, mild to moderate ICD-10-CM: I35.0 ICD-9-CM: 424.1  10/27/2015 Yes Chronic back pain (Chronic) ICD-10-CM: M54.9, G89.29 ICD-9-CM: 724.5, 338.29  2/12/2015 Yes Obesity (Chronic) ICD-10-CM: W27.7 ICD-9-CM: 278.00  8/17/2012 Yes Coronary artery disease (Chronic) ICD-10-CM: I25.10 ICD-9-CM: 414.00  8/17/2012 Yes Overview Signed 8/17/2012  3:57 PM by Liane Wagner Diagnosed 1999, CABG 12/2011 Cardiomyopathy, ischemic (Chronic) ICD-10-CM: I25.5 ICD-9-CM: 414.8  8/17/2012 Yes Overview Signed 8/17/2012  3:58 PM by Liane Wagner Pacemaker AICD 12/2011,  35% LUEF Factor 5 Leiden mutation, heterozygous ([facility] (Chronic) ICD-10-CM: W49.28 
ICD-9-CM: 289.81  8/17/2012 Yes Overview Signed 8/17/2012  4:02 PM by Liane Wagner Chronic anticoag., hx DVT Hyperlipidemia (Chronic) ICD-10-CM: L33.6 ICD-9-CM: 272.4  8/17/2012 Yes Plan:  (Medical Decision Making) -- continue lasix,steroids, ABX 
- BIPAP at night , will need NIPPV at home -I think consult to case management was placed last week, will follow up on Monday, ? Whether he will be compliant -PT 
- stable to transfer to floor More than 50% of the time documented was spent in face-to-face contact with the patient and in the care of the patient on the floor/unit where the patient is located.  
 
Maynor Quan MD

## 2019-01-13 NOTE — PROGRESS NOTES
Patient thinks he is constipated. Attempted to use bedpan. He refused to make any attempt to use the bedside commode.   Bedpan placed under patient per his request.

## 2019-01-13 NOTE — PROGRESS NOTES
Hospitalist Progress Note Admit Date:  1/10/2019  4:02 PM  
Name:  Alexus Tapia Age:  77 y.o. 
:  1940 MRN:  [id number] PCP:  Sotero Colón MD 
Treatment Team: Attending Provider: Ayaz Hugo MD; Consulting Provider: Johnny Castillo MD; Utilization Review: Shirin Noble RN Subjective:  
30-year-old male history of coronary artery disease, ischemic cardipmyopathy, hypertension, factor V Leiden, CABG, AICD for nonsustained V. tach, phlebitis, DVT, paroxysmal atrial fibrillation, aortic stenosis presents from his primary care office for pneumonia. Pt does have chronic sob on exertion since cabg- several years ago. Since past 2 weeks- nasal congestion,increased sob-- no fever or headache or dizziness or chest pain. Went to pcp today for his INR check- daughter complained that pt was looking ill- wanted pcp to evaluate- found to be hypoxic - oxygen sat 83%- was given breathing treatment- was told that has pneumonia- sent to er for further evaluation. 
  
In er found to have oxygen saturation 89- cxr- cardiomegaly with infiltrate- started in rocephin and zithromax. Mild elevated d-dimer- ordered ct chest with contrast - no pe 
  
Pt will be admitted for acute on chronic sob-secondary to pneumonia. 19 Pt in ICU presently on bipap- responding to painful stimuli 19 Says breathing ok On high flow oxygen- used bipap last night 19 C/oconstipation Breathing better-on nasal cannula Objective:  
 
Patient Vitals for the past 24 hrs: 
 Temp Pulse Resp BP SpO2  
19 1001  78 27 116/65 93 % 19 0959  80 18  92 % 19 0904  78 19  92 % 19 0903  70 24  95 % 19 0902  79 22  93 % 19 0901    116/64 93 % 19 0755  77 29 107/56 93 % 19 0732     96 % 19 0701  67 28 107/56 93 % 19 0504  72 (!) 32 116/65 93 % 19 0408     96 % 01/13/19 0401 98.2 °F (36.8 °C) 69 27 103/52 91 % 01/13/19 0201  69 20 101/58 92 % 01/13/19 0101  60 20 123/57 93 % 01/13/19 0001  64 22 102/54 95 % 01/12/19 2340     97 % 01/12/19 2327     93 % 01/12/19 2301  67 29 102/50 93 % 01/12/19 2201  71 (!) 33 110/60 94 % 01/12/19 2101   24 111/61 90 % 01/12/19 2001 97.8 °F (36.6 °C) 64 26 104/58 91 % 01/12/19 1916     94 % 01/12/19 1901  68 (!) 32 111/53 94 % 01/12/19 1802     100 % 01/12/19 1801  69 25 107/57 (!) 84 % 01/12/19 1718 97.1 °F (36.2 °C)      
01/12/19 1706  76 (!) 31 99/49 94 % 01/12/19 1701  74 23 91/54 92 % 01/12/19 1700  64  99/49   
01/12/19 1601  70 29 101/56 92 % 01/12/19 1519     96 % 01/12/19 1501    92/54   
01/12/19 1500  80 27  92 % 01/12/19 1401  86 23 127/57 96 % 01/12/19 1306  87 19  94 % 01/12/19 1301    110/57   
01/12/19 1256 98.4 °F (36.9 °C)      
01/12/19 1254    117/67 94 % 01/12/19 1202  83 26  92 % 01/12/19 1201    111/50   
01/12/19 1118     94 % Oxygen Therapy O2 Sat (%): 93 % (01/13/19 1001) Pulse via Oximetry: 80 beats per minute (01/13/19 1001) O2 Device: Hi flow nasal cannula;Humidifier (01/13/19 0755) O2 Flow Rate (L/min): 3 l/min (01/13/19 0755) O2 Temperature: 87.8 °F (31 °C) (01/11/19 1155) FIO2 (%): 40 % (01/12/19 2340) Intake/Output Summary (Last 24 hours) at 1/13/2019 1037 Last data filed at 1/13/2019 5985 Gross per 24 hour Intake 355 ml Output 3675 ml Net -3320 ml General:    Well nourished. On nasal cannula 
heent- normal 
CV:   RRR. No murmur, rub, or gallop. Lungs:   Coarse beath sounds Abdomen:   Soft, nontender, nondistended. Cns-awake,alert oriented x3, no focal neurological deficits Extremities: Warm and dry. No cyanosis. bilateral lower ext edema. Skin:     No rashes or jaundice. Data Review: I have reviewed all labs, meds, telemetry events, and studies from the last 24 hours. Recent Results (from the past 24 hour(s)) PLEASE READ & DOCUMENT PPD TEST IN 48 HRS Collection Time: 01/12/19 10:12 PM  
Result Value Ref Range PPD negative Negative  
 mm Induration 0 mm PROTHROMBIN TIME + INR Collection Time: 01/13/19  3:41 AM  
Result Value Ref Range Prothrombin time 15.2 (H) 11.7 - 14.5 sec INR 1.2 METABOLIC PANEL, BASIC Collection Time: 01/13/19  3:41 AM  
Result Value Ref Range Sodium 137 136 - 145 mmol/L Potassium 3.8 3.5 - 5.1 mmol/L Chloride 98 98 - 107 mmol/L  
 CO2 31 21 - 32 mmol/L Anion gap 8 mmol/L Glucose 164 (H) 65 - 100 mg/dL BUN 35 (H) 8 - 23 MG/DL Creatinine 1.27 0.8 - 1.5 MG/DL  
 GFR est AA >60 >60 ml/min/1.73m2 GFR est non-AA 58 ml/min/1.73m2 Calcium 8.6 8.3 - 10.4 MG/DL All Micro Results None Current Meds: 
Current Facility-Administered Medications Medication Dose Route Frequency  acetaZOLAMIDE SR (DIAMOX) capsule 500 mg  500 mg Oral Q12H  pantoprazole (PROTONIX) tablet 40 mg  40 mg Oral ACB  calcium carbonate (TUMS) chewable tablet 200 mg [elemental]  200 mg Oral TID WITH MEALS  
 albuterol-ipratropium (DUO-NEB) 2.5 MG-0.5 MG/3 ML  3 mL Nebulization Q4H RT  
 methylPREDNISolone (PF) (SOLU-MEDROL) injection 40 mg  40 mg IntraVENous Q8H  
 azithromycin (ZITHROMAX) 500 mg in 0.9% sodium chloride (MBP/ADV) 250 mL  500 mg IntraVENous Q24H  cefTRIAXone (ROCEPHIN) 1 g in 0.9% sodium chloride (MBP/ADV) 50 mL  1 g IntraVENous Q24H  
 enoxaparin (LOVENOX) injection 40 mg  40 mg SubCUTAneous Q12H  nystatin (MYCOSTATIN) 100,000 unit/gram powder   Topical BID  influenza vaccine 2018-19 (6 mos+)(PF) (FLUARIX QUAD/FLULAVAL QUAD) injection 0.5 mL  0.5 mL IntraMUSCular PRIOR TO DISCHARGE  sodium chloride (NS) flush 10 mL  10 mL InterCATHeter Q8H  
  sodium chloride (NS) flush 10 mL  10 mL InterCATHeter PRN  
 bisacodyl (DULCOLAX) tablet 10 mg  10 mg Oral QHS  docusate sodium (COLACE) capsule 400 mg  400 mg Oral QHS  sodium chloride (NS) flush 5-40 mL  5-40 mL IntraVENous PRN  
 amLODIPine (NORVASC) tablet 10 mg  10 mg Oral DAILY  aspirin delayed-release tablet 81 mg  81 mg Oral DAILY  carvedilol (COREG) tablet 25 mg  25 mg Oral BID WITH MEALS  guaiFENesin ER (MUCINEX) tablet 1,200 mg  1,200 mg Oral BID  
 HYDROcodone-acetaminophen (NORCO)  mg tablet 1 Tab  1 Tab Oral Q6H PRN  
 lisinopril (PRINIVIL, ZESTRIL) tablet 20 mg  20 mg Oral DAILY  pravastatin (PRAVACHOL) tablet 40 mg  40 mg Oral QHS  sodium chloride (NS) flush 5-40 mL  5-40 mL IntraVENous PRN  
 acetaminophen (TYLENOL) tablet 650 mg  650 mg Oral Q4H PRN  
 morphine injection 1 mg  1 mg IntraVENous Q4H PRN  
 naloxone (NARCAN) injection 0.4 mg  0.4 mg IntraVENous PRN  
 ondansetron (ZOFRAN) injection 4 mg  4 mg IntraVENous Q4H PRN  
 hydrALAZINE (APRESOLINE) 20 mg/mL injection 10 mg  10 mg IntraVENous Q6H PRN  
 loratadine (CLARITIN) tablet 10 mg  10 mg Oral DAILY  warfarin (COUMADIN) tablet 5 mg  5 mg Oral QPM  
 
 
Other Studies (last 24 hours): No results found. Assessment and Plan:  
 
Hospital Problems as of 1/13/2019 Date Reviewed: 10/2/2018 Codes Class Noted - Resolved POA  
 COPD exacerbation ([facility] ICD-10-CM: J44.1 ICD-9-CM: 491.21  1/11/2019 - Present Unknown Hyperkalemia ICD-10-CM: E87.5 ICD-9-CM: 276.7  1/10/2019 - Present Unknown Hypoxia ICD-10-CM: R09.02 
ICD-9-CM: 799.02  1/10/2019 - Present Unknown * (Principal) PNA (pneumonia) ICD-10-CM: J18.9 ICD-9-CM: 508  1/10/2019 - Present Unknown Respiratory failure with hypercapnia ([facility] ICD-10-CM: M30.49 
ICD-9-CM: 518.81  1/10/2019 - Present Unknown Essential hypertension ICD-10-CM: I10 
ICD-9-CM: 401.9  2/6/2017 - Present Yes Chronic systolic congestive heart failure (HCC) ICD-10-CM: I50.22 ICD-9-CM: 428.22, 428.0  10/27/2015 - Present Yes AICD (automatic cardioverter/defibrillator) present ICD-10-CM: Z95.810 ICD-9-CM: V45.02  10/27/2015 - Present Yes Paroxysmal atrial fibrillation (HCC) ICD-10-CM: I48.0 ICD-9-CM: 427.31  10/27/2015 - Present Yes Aortic stenosis, mild to moderate ICD-10-CM: I35.0 ICD-9-CM: 424.1  10/27/2015 - Present Yes Osteoarthritis (Chronic) ICD-10-CM: M19.90 ICD-9-CM: 715.90  2/12/2015 - Present Overview Signed 10/27/2015  9:58 AM by Eloise Calvo With severe chronic low back pain. Chronic back pain (Chronic) ICD-10-CM: M54.9, G89.29 ICD-9-CM: 724.5, 338.29  2/12/2015 - Present Yes Obesity (Chronic) ICD-10-CM: E42.1 ICD-9-CM: 278.00  8/17/2012 - Present Yes Coronary artery disease (Chronic) ICD-10-CM: I25.10 ICD-9-CM: 414.00  8/17/2012 - Present Yes Overview Signed 8/17/2012  3:57 PM by Ranjeet Valdez Diagnosed 1999, CABG 12/2011 Cardiomyopathy, ischemic (Chronic) ICD-10-CM: I25.5 ICD-9-CM: 414.8  8/17/2012 - Present Yes Overview Signed 8/17/2012  3:58 PM by Ranjeet Valdez Pacemaker AICD 12/2011,  35% LUEF Factor 5 Leiden mutation, heterozygous ([facility] (Chronic) ICD-10-CM: B70.40 
ICD-9-CM: 289.81  8/17/2012 - Present Yes Overview Signed 8/17/2012  4:02 PM by Ranjeet Valdez Chronic anticoag., hx DVT Hyperlipidemia (Chronic) ICD-10-CM: C08.9 ICD-9-CM: 272.4  8/17/2012 - Present Yes PLAN:   
Acute hypoxic and hypercapnic resp failure- on bipap at night- now on nasal cannula 
pna- rocephin zithromax Copd exa- steroids Constipation- added  Lactulose. Obesity Sub therapeutic inr- coumadin,lovenox. htn Cad DC planning/Dispo: DVT ppx:  coumadin Signed: 
Paulina Cho MD

## 2019-01-13 NOTE — PROGRESS NOTES
This note also relates to the following rows which could not be included: 
O2 Sat (%) - Cannot attach notes to unvalidated device data Pulse via Oximetry - Cannot attach notes to unvalidated device data 
 
 
 01/13/19 0734 Oxygen Therapy O2 Device Hi flow nasal cannula;Humidifier O2 Flow Rate (L/min) 3 l/min  
 
 
 01/13/19 0734 Oxygen Therapy O2 Device Hi flow nasal cannula;Humidifier O2 Flow Rate (L/min) 3 l/min

## 2019-01-13 NOTE — PROGRESS NOTES
Wound Center Progress Note Patient: Shu Rosa MRN: [id number]  SSN: [id number] YOB: 1940  Age: 77 y.o. Sex: male Subjective: Chief Complaint: 
Left knee wound History of Present Illness:    
Left leg contusion s/p fall Wound Caused By: Contusion, anticoagulation, lagos lesion Associated Signs and Symptoms: Initial drainage, pain Timing: constant wound since June 2 Quality: wound Severity: full thickness Modifying Factors: CHF, LE edema Current Wound Care: dressings and wound vac Wraps being tolerated well but fell down since last.  
 
Past Medical History:  
Diagnosis Date  AICD (automatic cardioverter/defibrillator) present 10/27/2015  Aortic stenosis, mild to moderate 10/27/2015  Cardiomyopathy, ischemic 8/17/2012  Carotid occlusion, bilateral 8/20/2012  Chronic back pain 2/12/2015  Chronic rhinitis 10/27/2015  Chronic systolic congestive heart failure ([address] 10/27/2015 NYHA class 2  
 Coronary artery disease 8/17/2012  DVT, lower extremity, recurrent ([address] 1994 and 2003 - on chronic coumadin  Factor 5 Leiden mutation, heterozygous ([address] 8/17/2012  Heart attack ([address] \"I had a heart attack the day I was being discharged from my triple by-pass\"  History of alcoholism ([address] quit age 34  
 History of complete eye exam 10/2016  Hx of smoking quit after 19  Hyperlipidemia 8/17/2012  Hypertension 8/17/2012  NSVT (nonsustained ventricular tachycardia) (HCC)  Obesity 8/17/2012  Osteoarthritis 2/12/2015  Pacemaker  Paroxysmal atrial fibrillation ([address] 10/27/2015  Phlebitis and thrombophlebitis 1995, 2004  
 due to Factor 5 Leiden  S/P total knee arthroplasty 2/26/2015  Tobacco abuse 2/12/2015  Wears dentures Past Surgical History:  
Procedure Laterality Date [address]  HX CATARACT REMOVAL    
 iop  HX CORONARY ARTERY BYPASS GRAFT  x 3, 2011  
 in [place]  HX IMPLANTABLE CARDIOVERTER DEFIBRILLATOR  2011 +Defibrilator placement [address]  HX LYMPH NODE DISSECTION Lymph node bx/removal from back  HX ORTHOPAEDIC Left Knee surgery Family History Problem Relation Age of Onset  Heart Disease Mother   
      CHF age 71  
 Other Father   
     blood clot  age 76  Cancer Sister 68 Pancreatic  Cancer Brother Skin  Heart Disease Brother Heart Valve Social History Tobacco Use  Smoking status: Former Smoker Packs/day: 1.00 Years: 20.00 Pack years: 20.00 Last attempt to quit: 1972 Years since quittin.0  Smokeless tobacco: Never Used Substance Use Topics  Alcohol use: No  
  Comment: quit at the age 34 Prior to Admission medications Medication Sig Start Date End Date Taking? Authorizing Provider  
furosemide (LASIX) 40 mg tablet TAKE 1 TABLET BY MOUTH DAILY 1/10/19   Pretty Sanchez MD  
HYDROcodone-acetaminophen [facility])  mg tablet Take one tablet by mouth every 4 to 6 hours prn for pain. 19   Pretty Sanchez MD  
amLODIPine (NORVASC) 10 mg tablet TAKE 1 TABLET BY MOUTH ONCE DAILY 18   Pretty Sanchez MD  
pravastatin (PRAVACHOL) 40 mg tablet TAKE 1 TABLET BY MOUTH EVERY EVENING 18   Pretty Sanchez MD  
warfarin (COUMADIN) 5 mg tablet TAKE 2 TABLET BY MOUTH MONDAY AND FRIDAY AND 1 1/2 TABLET BY MOUTH THE OTHER DAYS Patient taking differently: 10mg on Sun,MOn ,Wed,Th,Fri,SAt, 5mg on Tu18   Pretty Sanchez MD  
carvedilol (COREG) 25 mg tablet TAKE 1 TABLET BY MOUTH TWICE DAILY WITH MEALS 18   Pretty Sanchez MD  
lisinopril (PRINIVIL, ZESTRIL) 20 mg tablet TAKE 1 TABLET BY MOUTH TWICE DAILY FOR HIGH BLOOD PRESSURE 18   Pretty Sanchez MD  
guaiFENesin (MUCINEX) 1,200 mg tp12 ER tablet Take 1,200 mg by mouth two (2) times a day. Provider, Historical  
MULTIVITS-MINERALS/FA/LYCOPENE (MEN'S DAILY PO) Take 1 Tab by mouth daily. Provider, Historical  
aspirin 81 mg tablet Take 81 mg by mouth. TAKE AM OF SURGERY WITH SMALL SIP OF WATER    Provider, Historical  
 
No Known Allergies Review of Systems: 
Pertinent items are noted in the History of Present Illness. Lab Results Component Value Date/Time Hemoglobin A1c 6.2 (H) 08/22/2017 10:14 AM  
  
 
Immunization History Administered Date(s) Administered  Influenza High Dose Vaccine PF 09/14/2016  Influenza Vaccine 10/01/2014  Influenza Vaccine (Quad) Mdck Pf 09/21/2017  Influenza Vaccine PF 09/29/2015  Influenza Vaccine Whole 11/03/2011  Pneumococcal Conjugate (PCV-13) 08/22/2017  Pneumococcal Vaccine (Pcv) 10/01/2005, 10/20/2010  TB Skin Test (PPD) Intradermal 02/26/2015, 01/11/2019 Body mass index is 39.87 kg/m². Counseling regarding nutrition done: Yes Pateint counsled about risks of smoking and cessation Current medications: 
Facility-Administered Medications Ordered in Other Encounters Medication Dose Route Frequency Provider Last Rate Last Dose  [START ON 1/14/2019] enoxaparin (LOVENOX) injection 40 mg  40 mg SubCUTAneous Q24H Jeff Riley MD      
 acetaZOLAMIDE SR (DIAMOX) capsule 500 mg  500 mg Oral Q12H Eugnee Sr MD   500 mg at 01/13/19 1400  pantoprazole (PROTONIX) tablet 40 mg  40 mg Oral ACB Germán Avelar MD   40 mg at 01/13/19 0300  calcium carbonate (TUMS) chewable tablet 200 mg [elemental]  200 mg Oral TID WITH MEALS Germán Avelar MD   200 mg at 01/13/19 1158  albuterol-ipratropium (DUO-NEB) 2.5 MG-0.5 MG/3 ML  3 mL Nebulization Q4H RT Eugene Sr MD   3 mL at 01/13/19 1124  
 methylPREDNISolone (PF) (SOLU-MEDROL) injection 40 mg  40 mg IntraVENous Q8H Trinity Hoyt MD   40 mg at 01/13/19 1344  azithromycin (ZITHROMAX) 500 mg in 0.9% sodium chloride (MBP/ADV) 250 mL  500 mg IntraVENous Q24H Antonia Sierra  mL/hr at 01/12/19 2108 500 mg at 01/12/19 2108  cefTRIAXone (ROCEPHIN) 1 g in 0.9% sodium chloride (MBP/ADV) 50 mL  1 g IntraVENous Q24H Antonia Sierra  mL/hr at 01/12/19 2032 1 g at 01/12/19 2032  nystatin (MYCOSTATIN) 100,000 unit/gram powder   Topical BID Shonna Gale MD      
 influenza vaccine 2018-19 (6 mos+)(PF) (FLUARIX QUAD/FLULAVAL QUAD) injection 0.5 mL  0.5 mL IntraMUSCular PRIOR TO DISCHARGE Shonna Gale MD      
 sodium chloride (NS) flush 10 mL  10 mL InterCATHeter Q8H Shonna Gale MD   10 mL at 01/13/19 1344  sodium chloride (NS) flush 10 mL  10 mL InterCATHeter PRN Shonna Gale MD   10 mL at 01/12/19 1842  bisacodyl (DULCOLAX) tablet 10 mg  10 mg Oral QHS Shonna Gale MD   10 mg at 01/12/19 2109  
 docusate sodium (COLACE) capsule 400 mg  400 mg Oral QHS Shonna Gale MD   400 mg at 01/12/19 2109  
 sodium chloride (NS) flush 5-40 mL  5-40 mL IntraVENous PRN rAcenio Rodriguez MD      
 amLODIPine (NORVASC) tablet 10 mg  10 mg Oral DAILY Shonna Gale MD   10 mg at 01/13/19 7610  aspirin delayed-release tablet 81 mg  81 mg Oral DAILY Shonna Gale MD   81 mg at 01/13/19 0348  carvedilol (COREG) tablet 25 mg  25 mg Oral BID WITH MEALS Shonna Gale MD   25 mg at 01/13/19 0755  
 guaiFENesin ER (MUCINEX) tablet 1,200 mg  1,200 mg Oral BID Shonna Gale MD   1,200 mg at 01/13/19 0956  
 HYDROcodone-acetaminophen (NORCO)  mg tablet 1 Tab  1 Tab Oral Q6H PRN Shonna Gale MD   1 Tab at 01/11/19 2129  lisinopril (PRINIVIL, ZESTRIL) tablet 20 mg  20 mg Oral DAILY Shonna Gale MD   20 mg at 01/13/19 9264  pravastatin (PRAVACHOL) tablet 40 mg  40 mg Oral QHS Shonna Gale MD   40 mg at 01/12/19 2118  sodium chloride (NS) flush 5-40 mL  5-40 mL IntraVENous PRN Bathina, Victor M Ernst MD   25 mL at 01/10/19 1907  acetaminophen (TYLENOL) tablet 650 mg  650 mg Oral Q4H PRN Clotilde Lemus MD      
 morphine injection 1 mg  1 mg IntraVENous Q4H PRN Clotilde Lemus MD      
 naloxone [facility]) injection 0.4 mg  0.4 mg IntraVENous PRN Clotilde Lemus MD      
 ondansetron [facility]) injection 4 mg  4 mg IntraVENous Q4H PRN Clotilde Lemus MD      
 hydrALAZINE (APRESOLINE) 20 mg/mL injection 10 mg  10 mg IntraVENous Q6H PRN Clotilde Lemus MD      
 loratadine (CLARITIN) tablet 10 mg  10 mg Oral DAILY Clotilde Lemus MD   10 mg at 01/12/19 1841  warfarin (COUMADIN) tablet 5 mg  5 mg Oral QPM Clotilde Lemus MD   5 mg at 01/12/19 1841 Objective:  
 
Physical Exam:  
 
General: well developed, well nourished, pleasant , NAD. Hygiene good Psych: cooperative. Pleasant. No anxiety or depression. Normal mood and affect. Neuro: alert and oriented to person/place/situation. Otherwise nonfocal. 
Derm: Normal turgor for age, dry skin HEENT: Normocephalic, atraumatic Neck: Normal range of motion Chest: Good air entry bilaterally Cardio: Normal heart sounds Abdomen: Soft, nontender Lower extremities: color normal; temperature normal.  
Unstable gait. Data Review:  
Recent Results (from the past 24 hour(s)) PLEASE READ & DOCUMENT PPD TEST IN 48 HRS Collection Time: 01/12/19 10:12 PM  
Result Value Ref Range PPD negative Negative  
 mm Induration 0 mm PROTHROMBIN TIME + INR Collection Time: 01/13/19  3:41 AM  
Result Value Ref Range Prothrombin time 15.2 (H) 11.7 - 14.5 sec INR 1.2 METABOLIC PANEL, BASIC Collection Time: 01/13/19  3:41 AM  
Result Value Ref Range Sodium 137 136 - 145 mmol/L Potassium 3.8 3.5 - 5.1 mmol/L Chloride 98 98 - 107 mmol/L  
 CO2 31 21 - 32 mmol/L Anion gap 8 mmol/L Glucose 164 (H) 65 - 100 mg/dL BUN 35 (H) 8 - 23 MG/DL  Creatinine 1.27 0.8 - 1.5 MG/DL  
 GFR est AA >60 >60 ml/min/1.73m2 GFR est non-AA 58 ml/min/1.73m2 Calcium 8.6 8.3 - 10.4 MG/DL Assessment:  
 
Good local wound care Edema management Nutrition optimization Good Diabetic control Plan:  
 
Continue endoform and hydrafera with compression

## 2019-01-14 PROBLEM — E66.2 OBESITY HYPOVENTILATION SYNDROME (HCC): Status: ACTIVE | Noted: 2019-01-14

## 2019-01-14 LAB
ANION GAP SERPL CALC-SCNC: 7 MMOL/L
BUN SERPL-MCNC: 40 MG/DL (ref 8–23)
CALCIUM SERPL-MCNC: 8.1 MG/DL (ref 8.3–10.4)
CHLORIDE SERPL-SCNC: 101 MMOL/L (ref 98–107)
CO2 SERPL-SCNC: 30 MMOL/L (ref 21–32)
CREAT SERPL-MCNC: 1.34 MG/DL (ref 0.8–1.5)
GLUCOSE SERPL-MCNC: 153 MG/DL (ref 65–100)
INR PPP: 1.2
POTASSIUM SERPL-SCNC: 4.2 MMOL/L (ref 3.5–5.1)
PROCALCITONIN SERPL-MCNC: <0.1 NG/ML
PROTHROMBIN TIME: 15.6 SEC (ref 11.7–14.5)
SODIUM SERPL-SCNC: 138 MMOL/L (ref 136–145)

## 2019-01-14 PROCEDURE — 74011000250 HC RX REV CODE- 250: Performed by: FAMILY MEDICINE

## 2019-01-14 PROCEDURE — 94760 N-INVAS EAR/PLS OXIMETRY 1: CPT

## 2019-01-14 PROCEDURE — C8929 TTE W OR WO FOL WCON,DOPPLER: HCPCS

## 2019-01-14 PROCEDURE — 74011250636 HC RX REV CODE- 250/636: Performed by: FAMILY MEDICINE

## 2019-01-14 PROCEDURE — 74011250637 HC RX REV CODE- 250/637: Performed by: FAMILY MEDICINE

## 2019-01-14 PROCEDURE — 36415 COLL VENOUS BLD VENIPUNCTURE: CPT

## 2019-01-14 PROCEDURE — 74011000250 HC RX REV CODE- 250: Performed by: INTERNAL MEDICINE

## 2019-01-14 PROCEDURE — 77010033678 HC OXYGEN DAILY

## 2019-01-14 PROCEDURE — 94660 CPAP INITIATION&MGMT: CPT

## 2019-01-14 PROCEDURE — 85610 PROTHROMBIN TIME: CPT

## 2019-01-14 PROCEDURE — 74011000258 HC RX REV CODE- 258: Performed by: INTERNAL MEDICINE

## 2019-01-14 PROCEDURE — 65610000001 HC ROOM ICU GENERAL

## 2019-01-14 PROCEDURE — 80048 BASIC METABOLIC PNL TOTAL CA: CPT

## 2019-01-14 PROCEDURE — 97530 THERAPEUTIC ACTIVITIES: CPT

## 2019-01-14 PROCEDURE — 99232 SBSQ HOSP IP/OBS MODERATE 35: CPT | Performed by: INTERNAL MEDICINE

## 2019-01-14 PROCEDURE — 74011250636 HC RX REV CODE- 250/636: Performed by: INTERNAL MEDICINE

## 2019-01-14 PROCEDURE — 94640 AIRWAY INHALATION TREATMENT: CPT

## 2019-01-14 PROCEDURE — 84145 PROCALCITONIN (PCT): CPT

## 2019-01-14 RX ORDER — PREDNISONE 20 MG/1
40 TABLET ORAL
Status: DISCONTINUED | OUTPATIENT
Start: 2019-01-15 | End: 2019-01-15

## 2019-01-14 RX ADMIN — IPRATROPIUM BROMIDE AND ALBUTEROL SULFATE 3 ML: .5; 3 SOLUTION RESPIRATORY (INHALATION) at 01:56

## 2019-01-14 RX ADMIN — AMLODIPINE BESYLATE 10 MG: 10 TABLET ORAL at 08:27

## 2019-01-14 RX ADMIN — GUAIFENESIN 1200 MG: 600 TABLET, EXTENDED RELEASE ORAL at 08:26

## 2019-01-14 RX ADMIN — NYSTATIN: 100000 POWDER TOPICAL at 17:22

## 2019-01-14 RX ADMIN — WARFARIN SODIUM 7.5 MG: 2.5 TABLET ORAL at 17:21

## 2019-01-14 RX ADMIN — CARVEDILOL 25 MG: 25 TABLET, FILM COATED ORAL at 17:21

## 2019-01-14 RX ADMIN — CALCIUM CARBONATE 200 MG: 500 TABLET, CHEWABLE ORAL at 08:27

## 2019-01-14 RX ADMIN — CARVEDILOL 25 MG: 25 TABLET, FILM COATED ORAL at 08:27

## 2019-01-14 RX ADMIN — ENOXAPARIN SODIUM 40 MG: 40 INJECTION SUBCUTANEOUS at 10:34

## 2019-01-14 RX ADMIN — PANTOPRAZOLE SODIUM 40 MG: 40 TABLET, DELAYED RELEASE ORAL at 08:27

## 2019-01-14 RX ADMIN — CALCIUM CARBONATE 200 MG: 500 TABLET, CHEWABLE ORAL at 17:21

## 2019-01-14 RX ADMIN — GUAIFENESIN 1200 MG: 600 TABLET, EXTENDED RELEASE ORAL at 21:18

## 2019-01-14 RX ADMIN — LORATADINE 10 MG: 10 TABLET ORAL at 17:22

## 2019-01-14 RX ADMIN — IPRATROPIUM BROMIDE AND ALBUTEROL SULFATE 3 ML: .5; 3 SOLUTION RESPIRATORY (INHALATION) at 19:56

## 2019-01-14 RX ADMIN — METHYLPREDNISOLONE SODIUM SUCCINATE 40 MG: 40 INJECTION, POWDER, FOR SOLUTION INTRAMUSCULAR; INTRAVENOUS at 06:07

## 2019-01-14 RX ADMIN — Medication 10 ML: at 14:00

## 2019-01-14 RX ADMIN — PERFLUTREN 1 ML: 6.52 INJECTION, SUSPENSION INTRAVENOUS at 14:00

## 2019-01-14 RX ADMIN — Medication 10 ML: at 06:07

## 2019-01-14 RX ADMIN — LISINOPRIL 20 MG: 20 TABLET ORAL at 08:27

## 2019-01-14 RX ADMIN — NYSTATIN: 100000 POWDER TOPICAL at 08:27

## 2019-01-14 RX ADMIN — IPRATROPIUM BROMIDE AND ALBUTEROL SULFATE 3 ML: .5; 3 SOLUTION RESPIRATORY (INHALATION) at 12:51

## 2019-01-14 RX ADMIN — Medication 10 ML: at 21:19

## 2019-01-14 RX ADMIN — DOCUSATE SODIUM 400 MG: 100 CAPSULE, LIQUID FILLED ORAL at 21:19

## 2019-01-14 RX ADMIN — ASPIRIN 81 MG: 81 TABLET, COATED ORAL at 08:26

## 2019-01-14 RX ADMIN — AZITHROMYCIN MONOHYDRATE 500 MG: 500 INJECTION, POWDER, LYOPHILIZED, FOR SOLUTION INTRAVENOUS at 19:32

## 2019-01-14 RX ADMIN — CEFTRIAXONE 1 G: 1 INJECTION, POWDER, FOR SOLUTION INTRAMUSCULAR; INTRAVENOUS at 19:25

## 2019-01-14 RX ADMIN — PRAVASTATIN SODIUM 40 MG: 20 TABLET ORAL at 21:18

## 2019-01-14 RX ADMIN — BISACODYL 10 MG: 5 TABLET, COATED ORAL at 21:18

## 2019-01-14 RX ADMIN — IPRATROPIUM BROMIDE AND ALBUTEROL SULFATE 3 ML: .5; 3 SOLUTION RESPIRATORY (INHALATION) at 07:39

## 2019-01-14 NOTE — PROGRESS NOTES
Respiratory Care Services Policy Number: [id number] Title: Patient Care Assessment Program 
 
Effective Date: 01/1999 Revised Date: 05/2014 Reviewed Date: 06/2013/ 03/2015, 03/2016, 06/2017 Overview In an effort to improve quality and reduce costs of respiratory care at [address] [place] [address], the Respiratory Department has developed a number of Patient Care Protocols. These protocols have been developed according to [employer] [facility] and are utilized for those patients who are ordered respiratory therapy using therapeutic indications and standardized approaches for accomplishing objectives. Patient Care Protocols are intended to improve care by: ? Defining the indications and standards of care agreed upon by the Pulmonary Medicine and [facility] of [address] [place] [address]. ? Training respiratory care practitioners to apply those criteria to individual patients and modify therapy as indicated by the protocols. ? Documenting the indication and care plan as part of the initial ordering process. ? Tapering or discontinuing treatments once the indication for therapy changes. The Patient Care Protocols shall be universally applied throughout the hospital as determined by  
the Pulmonary Medicine and [facility] Rationale for Patient Care Assessment Protocols: 
? Continuous Quality Improvement ? Cost containment ? Standardization of care 
? Enhanced continuity of care ? Utilization review ? Timely intervention The following patient care assessment protocols have been developed: ? Aerosolized Medication ? Bronchial Hygiene ? Oxygen Weaning Protocol ? Volume Expansion/Secretion Clearance Non-Vented ? Ventilator Weaning Protocol ? CVRU Fast Track Weaning Protocol ? Asthma Treatment Protocol ER ? Pediatric Asthma Treatment Protocol ER ? Alpha-1 Antitrypsin Deficiency Protocol ? Prone Positioning Protocol The Director of [address] [place] [address] oversees the Patient Care Assessment Program. The Clinical/ is responsible for protocol development and training. The Supervisor is responsible for implementation and  activities. Each patient with an order for respiratory treatments will receive an evaluation. All Registered Respiratory Care Practitioners (RCPs) will perform the evaluations. The same evaluation tool will be utilized for all initial and follow-up assessments. If the patient does not meet criteria for ordered therapy, the therapy will be discontinued. If the patient demonstrates an adverse response to initially ordered therapy, the therapy will be discontinued and the physician will be contacted. Specific physician's orders that deviate from protocols and are deemed \"inappropriate\" or \"unsafe\" will be addressed with ordering physician and/or medical director as required. Patients of [facility] [address] will not be assessed for the first 24 hours as the Medical Director of [address] [place] [address] has requested that changes in therapy should not be made during that time frame. Respiratory Patient Care Assessment Protocols I.  Policy: In an effort to provide quality patient care and effective utilization of services, physicians who order respiratory therapy will have their patients treated via the protocols established (see attached). All Registered [address] (RCPs) will complete the initial assessment. This assessment will indicate patient needs, and the care plan developed for the patient and will performed within 24 hours of admission. Frequency of the therapy will be set according to the results of the respiratory therapy evaluation and frequency guidelines policy.  Reassessment will be continued done every 48 hours and more frequently as needed for the individual patient. II. Purpose: A. To provide a process that will allow for ongoing assessment and care plan modification for patients receiving respiratory services based on both objective and or subjective patient responses to interventions. This process of protocol utilization will assist in patient care progression while eliminating the need for the physician to continually update respiratory therapy orders. B. To assure continuity of respiratory care that meets [facility] Clinical Practice Guidelines. III. Initiation:  Implement Respiratory Care Protocols for patients who are ordered by physician  
       to receive respiratory therapy procedures or for ventilator management. IV. Protocol: A. Upon receiving an order for therapy the RCP will review the patients EMR (electronic medical record) for all pertinent information includin. Physicians order for therapy 2. Patient history and physical examination 3. Physician progress notes 4. Diagnostic. X-rays, PFTs, arterial blood gases etc. 
 
 
B. The RCP will perform a respiratory assessment in the following manner: 1. Perform hand hygiene per hospital policy utilizing Standard Precautions for all patients and following transmission-based isolation as indicated per policy. 2. Identify patient via ID bracelet verifying patient name and birth date. 3. General observations: color, pattern and effort of breathing, chest expansion, (symmetrical and bilateral), level of consciousness and the ability to ambulate. 4. The RCP will assess patients cough ability and determine if Nasotracheal suctioning is needed. If patient is unable to produce sputum, at that time, the RCP should question the patient with regard to their sputum: production, color consistency, frequency and amount.  
5. Auscultation: Using a stethoscope, the RCP will listen and note quality of breath sounds and presence or absence of adventitious breath sounds in all lung fields, both anteriorly and posteriorly. 6. Upon completing the EMR review and physical assessment, the RCP will document findings in the RT Assessment section of the EMR. The score level will be provided and will be used to determine the frequency of therapy. V. Indications: A. Indications for Bronchial Hygiene Protocol will include: 1. Potential for or presence of atelectasis. 2. Need for hydration and removal of retained secretions. 3. Need for improvement of cough effectiveness. 4. Presence of conditions associated with disorder of pulmonary clearance: 
a. Cystic fibrosis b. Bronchiectasis B. Indications for Aerosolized Medication(s) Protocol should include: 1. Treatment of bronchospasm/wheezing 2. Improvement of mucociliary clearance 3. Treatment of stridor 4. History of Asthma or COPD 
           C.  Indications for Oxygen Therapy Protocol should include: 1. Documented hypoxemia 2. Severe trauma 3. Acute myocardial infarction 4. Short-term therapy (e.g. post anesthesia recovery) VI. Maintenance: A. Timely patient assessment is an integral part of this protocol therefore the following will be applied: 1. All non- critical care patients will be evaluated upon receiving initial respiratory care orders within 24 hours and re-evaluated within 48 hours (or more as needed). 2. Orders requesting a Respiratory Consult will be responded to in the following manner: 
a. In patient emergency situations, the RCP assigned to the floor will respond immediately to the patient, provide an initial respiratory assessment, and contact the patients physician as necessary for appropriate orders. b. In non-emergent situations, the RCP assigned to the floor will respond to the patient within 90 minutes and provide an initial respiratory assessment and contact patients physician as necessary for appropriate orders. c. An RCP will provide a comprehensive assessment as soon as possible. 3. Upon completion of an evaluation, the RCP will complete documentation in the patients EMR in the RT Assessment section. 4. The RCP who completes the assessment will document orders for therapy in the orders section of the patients EMR selecting new order. Next, per protocol should be selected indicating it is a protocol order and sign orders should be selected to complete the process. 5. The Pharmacy and Therapeutics (P&T) Committee has mandated that the medication Xopenex may be changed to unit dose albuterol without an order, except for those patients receiving Xopenex due to cardiac arrhythmias. The dosage for these patients should be 0.63 mg. and may be changed from 1.25 mg. to 0.63 mg per P & T Committee by the RCP completing the assessment. 6. Patients who are not experiencing cardiac arrhythmias, and are ordered Xopenex and Atrovent may be changed to Duoneb. VII. Safety: A. The following safety issues shall be monitored: 1. The RCP will perform hand hygiene per hospital policy utilizing Standard Precautions for all patients and following transmission-based isolation as indicated per hospital policy. 2. The RCP must exercise professional judgment in classifying the patient for frequency of therapy. 3. Appropriate classification of the patient will require an evaluation utilizing the Therapy Assessment Protocol Guidelines. 4. The RCP will confer with the physician concerning the care of the patient at any time questions or problems arise. 5. If during therapy, the patient exhibits no improvement or deterioration in clinical status the RCP will notify the physician and the patients nurse. VIII. Interventions: A. The patients nurse is responsible concerning all items related to his/her care. Ongoing communication with nursing is essential to successful protocol management. B. The RCP recognizes the value of the team approach in meeting the patients needs. Nursing input regarding the patients pulmonary condition will be sought as needed. IX. Reportable conditions: The RCP will inform the physician if: 1. There are acute changes in patients respiratory status. 2. The therapist is unable to determine appropriate care plan upon assessment. 3. The patient fails to reach therapeutic objective. 4. A change or additional medication is needed. X.  Patient Education: A. Patient will receive instruction on the followin. The treatment modality, including objectives and proper technique of therapy 2. Respiratory medications B. Documentation shall occur in the patient education section of the patients EMR. XI. Documentation: Record all findings as described above in the patients EMR. Related Protocols: A. Aerosolized Medication Protocol B. Bronchial Hygiene C. Oxygen Protocol D. Volume Expansion/Secretion Clearance E. Ventilator Weaning Protocols References: 
[address] Standard L    Respiratory Care Department Policy, Procedure and Protocol Guideline Manual, , TINA Sanchez. L  Therapist Driven Respiratory Care Protocols  A Practitioners Guide for Criteria-Based Respiratory Care by Lakeshia Russell M.D., and TINA Mckinney RRT. L  The rationale for therapist-driven protocols: an update. Respiratory Care 1998; 75:181-595 N   [facility] Clinical Practice Guidelines. Respiratory Care Services Policy Number: -[id number] Title: Aerosolized Medication Protocol Effective Date: 10/1998 Revised Date: 2013, 2016 Reviewed Date: 2014/ 2015 , 2017 I. Policy: The Aerosolized Medication Protocol shall by implemented by Respiratory Care              Practitioners (RCP) for patients with orders to receive aerosol therapy with medication. II. Purpose: To open and maintain obstructed airways, the RCP, will utilize the following  
protocol to select the indicated aerosolized medication(s) and determine the most effective method of delivery to the patient. III. Patient Type: All patients who are determined to meet aerosolized medication criteria as  
       outlined in this protocol. IV. Responsibility: Director, [address], registered Respiratory Care                                                     Practitioners (RCPs) with documented competency in the performance of                                     respiratory therapeutic techniques. V. Equipment needed: A. Stethoscope B. Pulse oximeter C. IPPB machine and circuit D. Aerosol nebulizer E. MDI Inhaler VI. Protocol: A. The following conditions are accepted indications for aerosolized medication therapy. 1. Bronchospasm/wheezing 2. Impaired mucociliary clearance 3. Tracheobronchial mucosal congestion/and laryngeal stridor 4. Diseases which commonly require aerosolized medication therapy include, but are not limited to: 
a. Asthma/reactive airway disease 
b. Bronchitis/emphysema (COPD) c. Cystic fibrosis 
d. Severe laryngitis/tracheitis 
e. Bronchiectasis 
f. Smoke inhalation or chemical trauma to the lung or upper airway 
g. Physical trauma to the upper airway 
h. Laryngotracheobronchitis 
i. Bronchiolitis 
j. Non-specific wheezing B. Indications for bronchodilator medications will include: 
a. Bronchospasm/ wheezing 
b. Asthma/reactive airway disease 
c. Chronic obstructive pulmonary disease 
d. Obstructive defect on pulmonary function testing C. Administration of medications 1. If a bronchodilator or any other type of respiratory medication is needed, a physician order must be indicated in the medication section in the patients EMR.   
2. When the physician specifies the medication and dosage at the time of request, the ordered medication will be used as part of the care plan. D. The following guidelines will be utilized in the evaluation and selection of the         appropriate delivery device for indicated medication(s): 
1. IPPB 
a. Ventilation is inadequate (rapid shallow breathing) b. Refractory atelectasis has developed, other forms of therapy are unsuccessful 
c. Inability to clear secretions 
d. Need to improve lung expansion 2. Unassisted aerosol (UA) is the preferred method of aerosol delivery and indicated if 
a. Ventilation is inadequate 
b. Patient demonstrates wheezing  
c. patient is unable to perform MDI effectively  
d. Patient preference 3. Metered Dose Inhaler (MDI)  
a. Patient is alert/cooperative 
b. Medication(s) available in this delivery method. c. Able to perform 3 second breath hold. d. Patient has demonstrated ability to use MDI effectively 
e. Patient has used MDI therapy previously, either at home or in the hospital. 
f. Note: The only approved inhalers on formulary are albuterol and Spiriva. VII. Guidelines:  
Monitor patients vital signs and evaluate patients clinical status. The need to change medication and/or modality may be indicated by: 1. A pulse greater than 120 bpm, or if a pulse increase of 20 bpm occurs with bronchodilator medications. 2. Significant worsening of dyspnea or wheezing occurring during or within 30 minutes of discontinuing therapy. 3. Worsening of patients sensorium (e.g. patient becomes confused or obtunded, and unable to follow directions). 4. Worsening of patients chest x-ray. 5. Change in sputum (e.g. increased pulmonary infiltrate, which might indicate need for volume expansion therapy). 6. Patient has difficulty coughing up secretions, which might indicate need for acetylcysteine and/or bronchial hygiene therapy. 7. Call physician immediately if dyspnea worsens and is not responsive to modifications allowed by protocol. VIII. Clinical Responsibility: A. The therapy assessment guidelines will be used to evaluate all patients receiving aerosolized medications with the exception of critical care areas. 1. RCPs will perform changes in therapy per protocol. 2. It will be the responsibility of RCP to provide instruction regarding respiratory medications, aerosol therapy and proper MDI technique, as well as, spacer usage to patients ordered MDI therapy. 3. Current therapy that is part of a patients home regimen will not be discontinued. IX. Documentation A. Document assessment findings in the respiratory assessment section of the patients EMR. B. Document changes in therapy per protocol in the respiratory orders section and in the care plan section of the patients EMR. C. Document patient education in the patient education section of the patients EMR. X. Outcome Criteria: A. Relief of wheezes and obstruction B. Improved cough and sputum color and consistency C. Improved chest x-ray D. Improved arterial oxygen tension and or SaO2 
E. Improved Peak Flow on asthmatic patients XI. Related Protocols: A. Respiratory Patient Care Protocols B. Bronchial Hygiene Therapy C. Oxygen Protocol Reference:

## 2019-01-14 NOTE — PROGRESS NOTES
Visited with pt regarding plans for discharge, pt states that his drt has the list of SNF's and that she would be by later on today so I could meet with her then. 1115  LM with dtr to please call regarding d/c plans, dtr not in pt's room. [address] [facility] here to see another pt and I suggested that this pt may be appropiate for admission. She reviewed and states that pt meets criteria. Will discuss with pt and family.

## 2019-01-14 NOTE — PROGRESS NOTES
Pt. Stated that he did not want bipap tonight. Still in chair. Kimberley Vann Spo2 95% on 3lpm HFNC

## 2019-01-14 NOTE — PROGRESS NOTES
Warfarin dosing per pharmacist 
 
Johanatristan Vasquez is a 77 y.o. male. @FMRV(96)@    @PTNI(70)@ Indication:  atrial fibrillation Goal INR:  2 - 3 Home dose:   5 - 10 mg? Risk factors or significant drug interactions:  macrolide antibiotics Other anticoagulants:  none Daily Monitoring Date  INR     Warfarin dose HGB              Notes 1/14                 1.2                7.5 mg                
1/13                 1.2                  5 mg                 
1/12                 1.2                  5 mg                14 
1/11                 1.5                  5 mg                14.5 
1/10  1.6  5 mg  15.2 Will increase Warfarin to 7.5 mg every evening. Will continue to monitor closely. Thank you, Michelle Goldman PharmD, BCPS

## 2019-01-14 NOTE — PROGRESS NOTES
Problem: Mobility Impaired (Adult and Pediatric) Goal: *Acute Goals and Plan of Care (Insert Text) STG: 
(1.)Mr. Jaqueline Cazares will move from supine to sit and sit to supine  with MINIMAL ASSIST within 3 treatment day(s). (2.)Mr. Jaqueline Cazares will transfer from bed to chair and chair to bed with MODERATE ASSIST using the least restrictive device within 3 treatment day(s). Met 1/14 LTG: 
(1.)Mr. Jaqueline Cazares will move from supine to sit and sit to supine  in bed with STAND BY ASSIST within 5-7 treatment day(s). (2.)Mr. Jaqueline Cazares will transfer from bed to chair and chair to bed with CONTACT GUARD ASSIST using the least restrictive device within 5-7 treatment day(s). (3.)Mr. Jaqueline Cazares will ambulate with MIN TO MODERATE ASSIST for 15 feet with the least restrictive device within 5-7  treatment day(s). ________________________________________________________________________________________________ PHYSICAL THERAPY: Daily Note, Treatment Day: 2nd, AM 1/14/2019INPATIENT: Hospital Day: 5 Payor: Katrina Da Silva / Plan: [address] / Product Type: [facility] Care Medicare /  
  
NAME/AGE/GENDER: Sudhakar Cheema is a 77 y.o. male PRIMARY DIAGNOSIS: PNA (pneumonia) [J18.9] Hypoxia [R09.02] Hyperkalemia [E87.5] Respiratory failure with hypercapnia (HCC) [J96.92] PNA (pneumonia) PNA (pneumonia) ICD-10: Treatment Diagnosis:  
 · Generalized Muscle Weakness (M62.81) · Other abnormalities of gait and mobility (R26.89) Precaution/Allergies: 
Patient has no known allergies. ASSESSMENT:  
Mr. Jaqueline Cazares is steadily progressing, but still too weak to expect to return home alone. This pt will need SNF rehab after [address]. This section established at most recent assessment PROBLEM LIST (Impairments causing functional limitations): 1. Decreased Strength 2. Decreased ADL/Functional Activities 3. Decreased Transfer Abilities 4. Decreased Ambulation Ability/Technique 5. Decreased Balance 6. Decreased Activity Tolerance 7. Increased Shortness of Breath 8. Decreased [place] with Home Exercise Program 
 INTERVENTIONS PLANNED: (Benefits and precautions of physical therapy have been discussed with the patient.) 1. Balance Exercise 2. Bed Mobility 3. Gait Training 4. Home Exercise Program (HEP) 5. Therapeutic Activites 6. Therapeutic Exercise/Strengthening 7. Transfer Training TREATMENT PLAN: Frequency/Duration: daily for duration of hospital stay Rehabilitation Potential For Stated Goals: Fair RECOMMENDED REHABILITATION/EQUIPMENT: (at time of discharge pending progress): Due to the probability of continued deficits (see above) this patient will likely need continued skilled physical therapy after discharge. Equipment:  
? Walkers, Type: Rolling Walker;   
    
 
 
 
HISTORY:  
History of Present Injury/Illness (Reason for Referral): 
Patient states he has a history of falls; sometimes he can walk and sometimes he can't. Patient is not a good historian. Family not present during evaluation. PER MD NOTES: 
 \"77year-old male history of coronary artery disease, ischemic cardipmyopathy, hypertension, factor V Leiden, CABG, AICD for nonsustained V. tach, phlebitis, DVT, paroxysmal atrial fibrillation, aortic stenosis presents from his primary care office for pneumonia. Pt does have chronic sob on exertion since cabg- several years ago. Since past 2 weeks- nasal congestion,increased sob-- no fever or headache or dizziness or chest pain. Went to pcp today for his INR check- daughter complained that pt was looking ill- wanted pcp to evaluate- found to be hypoxic - oxygen sat 83%- was given breathing treatment- was told that has pneumonia- sent to er for further evaluation. In er found to have oxygen saturation 89- cxr- cardiomegaly with infiltrate- started in rocephin and zithromax.  
Mild elevated d-dimer- ordered ct chest with contrast - pending. Pt will be admitted for acute on chronic sob-secondary to pneumonia. \" 
 
 
Past Medical History/Comorbidities:  
Mr. Clarisse Simms  has a past medical history of AICD (automatic cardioverter/defibrillator) present, Aortic stenosis, mild to moderate, Cardiomyopathy, ischemic, Carotid occlusion, bilateral, Chronic back pain, Chronic rhinitis, Chronic systolic congestive heart failure ([address] Coronary artery disease, DVT, lower extremity, recurrent ([address] Factor 5 Leiden mutation, heterozygous ([address] Heart attack ([address] History of alcoholism ([address] History of complete eye exam, smoking, Hyperlipidemia, Hypertension, NSVT (nonsustained ventricular tachycardia) ([address] Obesity, Osteoarthritis, Pacemaker, Paroxysmal atrial fibrillation ([address] Phlebitis and thrombophlebitis, S/P total knee arthroplasty, Tobacco abuse, and Wears dentures. Mr. Clarisse Simms  has a past surgical history that includes hx cataract removal; hx lipoma resection (1998); hx angioplasty (1999); hx implantable cardioverter defibrillator (12/18/2011); hx coronary artery bypass graft (x 3, 12/2011); hx orthopaedic (Left); hx lymph node dissection; and LEFT KNEE ARTHROPLASTY TOTAL / Fernanda Welsh / NICKY (Left, 2/26/2015). Social History/Living Environment:  
Home Environment: Apartment # Steps to Enter: 14 
Rails to Enter: Yes One/Two Story Residence: Two story(lives on second floor) Living Alone: Yes Support Systems: Friends \ neighbors Patient Expects to be Discharged to[de-identified] Rehabilitation facility Current DME Used/Available at Home: [employer], bedsidehas a daughter; unclear about living situation. Prior Level of Function/Work/Activity: 
Patient says he was able to ambulate but has history of falls. Dominant Side:  
      RIGHT Personal Factors:   
      Sex:  male Age:  77 y.o. Number of Personal Factors/Comorbidities that affect the Plan of Care: 1-2: MODERATE COMPLEXITY EXAMINATION:  
Most Recent Physical Functioning:  
Gross Assessment: 3-/5 throughout Balance: 
Sitting: Intact; Without support Standing: Impaired; With support(walker) Standing - Static: (fair- with walker) Standing - Dynamic : (fair- with walker) Bed Mobility: 
Rolling: (NT) Supine to Sit: (NT) Sit to Supine: (NT) Transfers: 
Sit to Stand: Minimum assistance Stand to Sit: Minimum assistance Bed to Chair: Minimum assistance(with walker) Duration: 23 Minutes(extra time to work through activity noted) Gait:Patient unable to ambulate at this time. Speed/Blanca: Shuffled; Slow Step Length: Left shortened;Right shortened Gait Abnormalities: Decreased step clearance;Shuffling gait;Trunk sway increased Distance (ft): 40 Feet (ft) Assistive Device: Walker, rolling Ambulation - Level of Assistance: Minimal assistance(LOB x 1 with mod assist to recover) Body Structures Involved: 1. Nerves 2. Heart 3. Lungs 4. Joints 5. Muscles Body Functions Affected: 1. Sensory/Pain 2. Neuromusculoskeletal 
3. Movement Related Activities and Participation Affected: 1. General Tasks and Demands 2. Mobility 3. Self Care 4. Domestic Life 5. Community, Social and [place] Number of elements that affect the Plan of Care: 3: MODERATE COMPLEXITY CLINICAL PRESENTATION:  
Presentation: Evolving clinical presentation with changing clinical characteristics: MODERATE COMPLEXITY CLINICAL DECISION MAKIN [facility] [place] [address] [address] AM-PAC 6 Clicks Basic Mobility Inpatient Short Form How much difficulty does the patient currently have. .. Unable A Lot A Little None 1. Turning over in bed (including adjusting bedclothes, sheets and blankets)? [] 1   [x] 2   [] 3   [] 4  
2. Sitting down on and standing up from a chair with arms ( e.g., wheelchair, bedside commode, etc.)   [] 1   [x] 2   [] 3   [] 4  
3. Moving from lying on back to sitting on the side of the bed? [] 1   [x] 2   [] 3   [] 4 How much help from another person does the patient currently need. ..  Total A Lot A Little None 4. Moving to and from a bed to a chair (including a wheelchair)? [x] 1   [] 2   [] 3   [] 4  
5. Need to walk in hospital room? [x] 1   [] 2   [] 3   [] 4  
6. Climbing 3-5 steps with a railing? [x] 1   [] 2   [] 3   [] 4  
© 2007, Trustees of [address] [place] [address] [address] [address], under license to [employer]. All rights reserved Score:  Initial: 9 Most Recent: X (Date: -- ) Interpretation of Tool:  Represents activities that are increasingly more difficult (i.e. Bed mobility, Transfers, Gait). Score 24 23 22-20 19-15 14-10 9-7 6 Modifier CH CI CJ CK CL CM CN   
 
? Mobility - Walking and Moving Around:  
  - CURRENT STATUS: CM - 80%-99% impaired, limited or restricted  - GOAL STATUS: CL - 60%-79% impaired, limited or restricted  - D/C STATUS:  ---------------To be determined--------------- Payor: [employer] MEDICARE / Plan: [address] / Product Type: Managed Care Medicare /   
 
Medical Necessity:    
· Patient is expected to demonstrate progress in strength, balance and functional technique to increase independence with mobility and improve safety during mobility. Reason for Services/Other Comments: 
· Patient continues to demonstrate capacity to improve strength, mobility which will increase independence and increase safety. Use of outcome tool(s) and clinical judgement create a POC that gives a: Questionable prediction of patient's progress: MODERATE COMPLEXITY  
  
 
 
 
TREATMENT:  
(In addition to Assessment/Re-Assessment sessions the following treatments were rendered) Pre-treatment Symptoms/Complaints:  Pt agreeable Pain: Initial: visual cues Pain Intensity 1: 0  Post Session:  0/10 Therapeutic Activity: (  23 Minutes(extra time to work through activity noted) ):  Therapeutic activities including repeated sit<>stand & stand pivot transfer with walker, standing balance activity ( wt-shift, overhead reach, alternating punching while standing, trunk rotation) with progression to gait using rolling walker to improve mobility, strength, balance, coordination and dynamic movement of arm - bilateral and leg - bilateral to improve functional WB stability. Date: 
1/13/19 Date: 
 Date: Activity/Exercise Parameters Parameters Parameters Ankle pumps 15 B Quad sets 15 B     
glute sets 15 B Hip ABD/ADD 15 B Heel slides 15 B    
LAQs 15 B    
marching 15 B Sit to stand  x5 Braces/Orthotics/Lines/Etc:  
· dukes catheter · ICU lines, O2 via nasal cannula Treatment/Session Assessment:   
· Response to Treatment:  Easily fatigued & SOB with activity · Interdisciplinary Collaboration:  
o Registered Nurse · After treatment position/precautions:  
o Up in chair 
o Bed/Chair-wheels locked 
o Call light within reach 
o RN notified · Compliance with Program/Exercises: Will assess as treatment progresses · Recommendations/Intent for next treatment session: \"Next visit will focus on advancements to more challenging activities and reduction in assistance provided\". Total Treatment Duration: PT Patient Time In/Time Out Time In: 5314 Time Out: 4138 Genaro Osuna PT

## 2019-01-14 NOTE — INTERDISCIPLINARY ROUNDS
Interdisciplinary team rounds were held 1/14/2019 with the following team members:Care Management, Nursing, Pastoral Care, Physical Therapy and Physician and the patient. Plan of care discussed. See clinical pathway and/or care plan for interventions and desired outcomes.

## 2019-01-14 NOTE — PROGRESS NOTES
Critical Care Daily Progress Note: 1/14/2019 Dax Hinson Admission Date: 1/10/2019 The patient's chart is reviewed and the patient is discussed with the staff. 
 
 
Nico Miller isidoro.o.  male  evaluated at the request of Dr. Serena Bauer. With  history of coronary artery disease, ischemic cardipmyopathy, hypertension, factor V Leiden, CABG, AICD for nonsustained V. tach, phlebitis, DVT, paroxysmal atrial fibrillation, aortic stenosis presents from his primary care office for pneumonia.  
Pt does have chronic sob on exertion since cabg- several years ago. Since past 2 weeks- nasal congestion,increased sob-- no fever or headache or dizziness or chest pain. Went to pcp1/10 for his INR check- daughter complained that pt was looking ill- wanted pcp to evaluate- found to be hypoxic - oxygen sat 83%- was given breathing treatment- was told that has pneumonia- sent to er for further evaluation. Subjective:  
Pt initially required bipap with paco2 > 100. He has improved and now is up in chair on nc- would not wear bipap last pm  
 
 
Current Facility-Administered Medications Medication Dose Route Frequency  enoxaparin (LOVENOX) injection 40 mg  40 mg SubCUTAneous Q24H  
 albuterol-ipratropium (DUO-NEB) 2.5 MG-0.5 MG/3 ML  3 mL Nebulization Q6H RT  
 pantoprazole (PROTONIX) tablet 40 mg  40 mg Oral ACB  calcium carbonate (TUMS) chewable tablet 200 mg [elemental]  200 mg Oral TID WITH MEALS  methylPREDNISolone (PF) (SOLU-MEDROL) injection 40 mg  40 mg IntraVENous Q8H  
 azithromycin (ZITHROMAX) 500 mg in 0.9% sodium chloride (MBP/ADV) 250 mL  500 mg IntraVENous Q24H  cefTRIAXone (ROCEPHIN) 1 g in 0.9% sodium chloride (MBP/ADV) 50 mL  1 g IntraVENous Q24H  nystatin (MYCOSTATIN) 100,000 unit/gram powder   Topical BID  influenza vaccine 2018-19 (6 mos+)(PF) (FLUARIX QUAD/FLULAVAL QUAD) injection 0.5 mL  0.5 mL IntraMUSCular PRIOR TO DISCHARGE  
  sodium chloride (NS) flush 10 mL  10 mL InterCATHeter Q8H  
 sodium chloride (NS) flush 10 mL  10 mL InterCATHeter PRN  
 bisacodyl (DULCOLAX) tablet 10 mg  10 mg Oral QHS  docusate sodium (COLACE) capsule 400 mg  400 mg Oral QHS  sodium chloride (NS) flush 5-40 mL  5-40 mL IntraVENous PRN  
 amLODIPine (NORVASC) tablet 10 mg  10 mg Oral DAILY  aspirin delayed-release tablet 81 mg  81 mg Oral DAILY  carvedilol (COREG) tablet 25 mg  25 mg Oral BID WITH MEALS  guaiFENesin ER (MUCINEX) tablet 1,200 mg  1,200 mg Oral BID  
 HYDROcodone-acetaminophen (NORCO)  mg tablet 1 Tab  1 Tab Oral Q6H PRN  
 lisinopril (PRINIVIL, ZESTRIL) tablet 20 mg  20 mg Oral DAILY  pravastatin (PRAVACHOL) tablet 40 mg  40 mg Oral QHS  sodium chloride (NS) flush 5-40 mL  5-40 mL IntraVENous PRN  
 acetaminophen (TYLENOL) tablet 650 mg  650 mg Oral Q4H PRN  
 morphine injection 1 mg  1 mg IntraVENous Q4H PRN  
 naloxone (NARCAN) injection 0.4 mg  0.4 mg IntraVENous PRN  
 ondansetron (ZOFRAN) injection 4 mg  4 mg IntraVENous Q4H PRN  
 hydrALAZINE (APRESOLINE) 20 mg/mL injection 10 mg  10 mg IntraVENous Q6H PRN  
 loratadine (CLARITIN) tablet 10 mg  10 mg Oral DAILY  warfarin (COUMADIN) tablet 5 mg  5 mg Oral QPM  
 
 
Review of Systems Constitutional:  negative for fever, chills, sweats Cardiovascular:  negative for chest pain, palpitations, syncope, edema Gastrointestinal:  negative for dysphagia, reflux, vomiting, diarrhea, abdominal pain, or melena Neurologic:  negative for focal weakness, numbness, headache Objective:  
 
Vitals:  
 01/14/19 8771 01/14/19 0601 01/14/19 0701 01/14/19 8463 BP: 104/59 96/57 106/60 Pulse: 79 82 85 Resp: (!) 31 (!) 32 27 Temp:   97.8 °F (36.6 °C) SpO2: 96% 96% 98% 96% Weight:      
 
 
Intake and Output:  
01/12 1901 - 01/14 0700 In: 2280 [P.O.:1680; I.V.:600] Out: [address] [NWKGE:1013] No intake/output data recorded. Physical Exam: Constitutional:  the patient is well developed and in no acute distress EENMT:  Sclera clear, pupils equal, oral mucosa moist 
Respiratory: clear Cardiovascular:  RRR without M,G,R 
Gastrointestinal: soft and non-tender; with positive bowel sounds. Musculoskeletal: warm without cyanosis. There is no lower leg edema. Skin:  no jaundice or rashes, no wounds Neurologic: no gross neuro deficits Psychiatric:  alert and oriented x 3 LINES: 
peripheral 
 
DRIPS: 
none CXR:  
 
 
LAB No results for input(s): GLUCPOC in the last 72 hours. No lab exists for component: [place] Recent Labs  
  01/14/19 0412 01/13/19 
0341 01/12/19 0347 WBC  --   --  8.7 HGB  --   --  14.0  
HCT  --   --  44.9 PLT  --   --  165 INR 1.2 1.2 1.2 Recent Labs  
  01/14/19 0412 01/13/19 0341 01/12/19 0347  137 134* K 4.2 3.8 4.2  98 91* CO2 30 31 38* * 164* 165* BUN 40* 35* 32* CREA 1.34 1.27 1.25  
CA 8.1* 8.6 8.2* Recent Labs  
  01/11/19 
1148 PHI 7.386 PCO2I 67.0*  
PO2I 57* HCO3I 40.2* No results for input(s): LCAD, LAC in the last 72 hours. No results for input(s): PH, PCO2, PO2, HCO3 in the last 72 hours. Assessment:  (Medical Decision Making) Hospital Problems  Date Reviewed: 10/2/2018 Codes Class Noted POA  
 COPD exacerbation ([facility] ICD-10-CM: J44.1 ICD-9-CM: 491.21  1/11/2019 Unknown Better -no wheezing Hyperkalemia ICD-10-CM: E87.5 ICD-9-CM: 276.7  1/10/2019 Unknown Hypoxia ICD-10-CM: R09.02 
ICD-9-CM: 799.02  1/10/2019 Unknown On nc * (Principal) PNA (pneumonia) ICD-10-CM: J18.9 ICD-9-CM: 846  1/10/2019 Unknown Respiratory failure with hypercapnia ([address] ICD-10-CM: G72.94 
ICD-9-CM: 518.81  1/10/2019 Unknown  
 improved Essential hypertension ICD-10-CM: I10 
ICD-9-CM: 401.9  2/6/2017 Yes Chronic systolic congestive heart failure (HCC) ICD-10-CM: I50.22 ICD-9-CM: 428.22, 428.0  10/27/2015 Yes AICD (automatic cardioverter/defibrillator) present ICD-10-CM: Z95.810 ICD-9-CM: V45.02  10/27/2015 Yes Paroxysmal atrial fibrillation (HCC) ICD-10-CM: I48.0 ICD-9-CM: 427.31  10/27/2015 Yes Aortic stenosis, mild to moderate ICD-10-CM: I35.0 ICD-9-CM: 424.1  10/27/2015 Yes Chronic back pain (Chronic) ICD-10-CM: M54.9, G89.29 ICD-9-CM: 724.5, 338.29  2/12/2015 Yes Obesity (Chronic) ICD-10-CM: I91.6 ICD-9-CM: 278.00  8/17/2012 Yes Coronary artery disease (Chronic) ICD-10-CM: I25.10 ICD-9-CM: 414.00  8/17/2012 Yes Overview Signed 8/17/2012  3:57 PM by Timo Morris Diagnosed 1999, CABG 12/2011 Cardiomyopathy, ischemic (Chronic) ICD-10-CM: I25.5 ICD-9-CM: 414.8  8/17/2012 Yes Overview Signed 8/17/2012  3:58 PM by Timo Fails Pacemaker AICD 12/2011,  35% LUEF Factor 5 Leiden mutation, heterozygous [facility] (Chronic) ICD-10-CM: Q77.14 
ICD-9-CM: 289.81  8/17/2012 Yes Overview Signed 8/17/2012  4:02 PM by Timo Fails Chronic anticoag., hx DVT Hyperlipidemia (Chronic) ICD-10-CM: M77.5 ICD-9-CM: 272.4  8/17/2012 Yes Probable JACOB Obesity hypoventilation Plan:  (Medical Decision Making) 1   Ok to move to floor 2   Pt agrees to use bipap this pm 
3    Day 5 rocephine/ zithromax 
--4  Decrease steroids More than 50% of the time documented was spent in face-to-face contact with the patient and in the care of the patient on the floor/unit where the patient is located.  
 
Henok Morel MD

## 2019-01-14 NOTE — PROGRESS NOTES
Bedside, Verbal and Written shift change report given to Bashir Ramos RN (oncoming nurse) by Linda Rubi (offgoing nurse). Report included the following information SBAR, Kardex, Recent Results and Cardiac Rhythm A-fib with occasional V-pacing. No significant changes this shift. Vitals stable with no acute distress. No need for BiPAP overnight. In the recliner all night with periods of sitting upright and periods of reclining.

## 2019-01-15 LAB
ANION GAP SERPL CALC-SCNC: 4 MMOL/L
ARTERIAL PATENCY WRIST A: YES
BASE DEFICIT BLD-SCNC: 2 MMOL/L
BASOPHILS # BLD: 0 K/UL (ref 0–0.2)
BASOPHILS NFR BLD: 0 % (ref 0–2)
BDY SITE: ABNORMAL
BNP SERPL-MCNC: 149 PG/ML
BODY TEMPERATURE: 98.6
BUN SERPL-MCNC: 47 MG/DL (ref 8–23)
CALCIUM SERPL-MCNC: 8 MG/DL (ref 8.3–10.4)
CHLORIDE SERPL-SCNC: 103 MMOL/L (ref 98–107)
CO2 BLD-SCNC: 26 MMOL/L
CO2 SERPL-SCNC: 32 MMOL/L (ref 21–32)
COLLECT TIME,HTIME: 1012
CREAT SERPL-MCNC: 1.31 MG/DL (ref 0.8–1.5)
DIFFERENTIAL METHOD BLD: ABNORMAL
EOSINOPHIL # BLD: 0 K/UL (ref 0–0.8)
EOSINOPHIL NFR BLD: 0 % (ref 0.5–7.8)
ERYTHROCYTE [DISTWIDTH] IN BLOOD BY AUTOMATED COUNT: 14.6 % (ref 11.9–14.6)
FLOW RATE ISTAT,IFRATE: 2 L/MIN
GAS FLOW.O2 O2 DELIVERY SYS: ABNORMAL L/MIN
GLUCOSE SERPL-MCNC: 127 MG/DL (ref 65–100)
HCO3 BLD-SCNC: 25 MMOL/L (ref 22–26)
HCT VFR BLD AUTO: 45.1 % (ref 41.1–50.3)
HGB BLD-MCNC: 13.6 G/DL (ref 13.6–17.2)
IMM GRANULOCYTES # BLD AUTO: 0.1 K/UL (ref 0–0.5)
IMM GRANULOCYTES NFR BLD AUTO: 1 % (ref 0–5)
INR PPP: 1.4
LYMPHOCYTES # BLD: 1.6 K/UL (ref 0.5–4.6)
LYMPHOCYTES NFR BLD: 16 % (ref 13–44)
MCH RBC QN AUTO: 29.4 PG (ref 26.1–32.9)
MCHC RBC AUTO-ENTMCNC: 30.2 G/DL (ref 31.4–35)
MCV RBC AUTO: 97.6 FL (ref 79.6–97.8)
MONOCYTES # BLD: 1.2 K/UL (ref 0.1–1.3)
MONOCYTES NFR BLD: 12 % (ref 4–12)
NEUTS SEG # BLD: 7.3 K/UL (ref 1.7–8.2)
NEUTS SEG NFR BLD: 72 % (ref 43–78)
NRBC # BLD: 0 K/UL (ref 0–0.2)
PCO2 BLD: 49.5 MMHG (ref 35–45)
PH BLD: 7.31 [PH] (ref 7.35–7.45)
PLATELET # BLD AUTO: 150 K/UL (ref 150–450)
PMV BLD AUTO: 10.5 FL (ref 9.4–12.3)
PO2 BLD: 67 MMHG (ref 75–100)
POTASSIUM SERPL-SCNC: 4.2 MMOL/L (ref 3.5–5.1)
PROTHROMBIN TIME: 17.7 SEC (ref 11.7–14.5)
RBC # BLD AUTO: 4.62 M/UL (ref 4.23–5.6)
SAO2 % BLD: 91 % (ref 95–98)
SERVICE CMNT-IMP: ABNORMAL
SERVICE CMNT-IMP: ABNORMAL
SODIUM SERPL-SCNC: 139 MMOL/L (ref 136–145)
SPECIMEN TYPE: ABNORMAL
TSH SERPL DL<=0.005 MIU/L-ACNC: 0.62 UIU/ML
WBC # BLD AUTO: 10.2 K/UL (ref 4.3–11.1)

## 2019-01-15 PROCEDURE — 74011636637 HC RX REV CODE- 636/637: Performed by: INTERNAL MEDICINE

## 2019-01-15 PROCEDURE — 65610000001 HC ROOM ICU GENERAL

## 2019-01-15 PROCEDURE — 85610 PROTHROMBIN TIME: CPT

## 2019-01-15 PROCEDURE — 74011250636 HC RX REV CODE- 250/636: Performed by: INTERNAL MEDICINE

## 2019-01-15 PROCEDURE — 85025 COMPLETE CBC W/AUTO DIFF WBC: CPT

## 2019-01-15 PROCEDURE — 74011000250 HC RX REV CODE- 250: Performed by: INTERNAL MEDICINE

## 2019-01-15 PROCEDURE — 94640 AIRWAY INHALATION TREATMENT: CPT

## 2019-01-15 PROCEDURE — 36600 WITHDRAWAL OF ARTERIAL BLOOD: CPT

## 2019-01-15 PROCEDURE — 80048 BASIC METABOLIC PNL TOTAL CA: CPT

## 2019-01-15 PROCEDURE — 84443 ASSAY THYROID STIM HORMONE: CPT

## 2019-01-15 PROCEDURE — 83880 ASSAY OF NATRIURETIC PEPTIDE: CPT

## 2019-01-15 PROCEDURE — 97530 THERAPEUTIC ACTIVITIES: CPT

## 2019-01-15 PROCEDURE — 74011000258 HC RX REV CODE- 258: Performed by: INTERNAL MEDICINE

## 2019-01-15 PROCEDURE — 74011250637 HC RX REV CODE- 250/637: Performed by: INTERNAL MEDICINE

## 2019-01-15 PROCEDURE — 99232 SBSQ HOSP IP/OBS MODERATE 35: CPT | Performed by: INTERNAL MEDICINE

## 2019-01-15 PROCEDURE — 77010033678 HC OXYGEN DAILY

## 2019-01-15 PROCEDURE — 74011250636 HC RX REV CODE- 250/636: Performed by: FAMILY MEDICINE

## 2019-01-15 PROCEDURE — 74011250637 HC RX REV CODE- 250/637: Performed by: FAMILY MEDICINE

## 2019-01-15 PROCEDURE — 82803 BLOOD GASES ANY COMBINATION: CPT

## 2019-01-15 PROCEDURE — 36415 COLL VENOUS BLD VENIPUNCTURE: CPT

## 2019-01-15 RX ORDER — IPRATROPIUM BROMIDE AND ALBUTEROL SULFATE 2.5; .5 MG/3ML; MG/3ML
3 SOLUTION RESPIRATORY (INHALATION)
Status: DISCONTINUED | OUTPATIENT
Start: 2019-01-15 | End: 2019-01-21 | Stop reason: HOSPADM

## 2019-01-15 RX ORDER — FUROSEMIDE 40 MG/1
40 TABLET ORAL
Status: COMPLETED | OUTPATIENT
Start: 2019-01-15 | End: 2019-01-15

## 2019-01-15 RX ORDER — PREDNISONE 20 MG/1
20 TABLET ORAL
Status: DISCONTINUED | OUTPATIENT
Start: 2019-01-16 | End: 2019-01-16

## 2019-01-15 RX ORDER — ENOXAPARIN SODIUM 100 MG/ML
40 INJECTION SUBCUTANEOUS EVERY 12 HOURS
Status: DISCONTINUED | OUTPATIENT
Start: 2019-01-15 | End: 2019-01-20

## 2019-01-15 RX ADMIN — IPRATROPIUM BROMIDE AND ALBUTEROL SULFATE 3 ML: .5; 3 SOLUTION RESPIRATORY (INHALATION) at 01:40

## 2019-01-15 RX ADMIN — CALCIUM CARBONATE 200 MG: 500 TABLET, CHEWABLE ORAL at 07:16

## 2019-01-15 RX ADMIN — CARVEDILOL 25 MG: 25 TABLET, FILM COATED ORAL at 17:52

## 2019-01-15 RX ADMIN — IPRATROPIUM BROMIDE AND ALBUTEROL SULFATE 3 ML: .5; 3 SOLUTION RESPIRATORY (INHALATION) at 17:04

## 2019-01-15 RX ADMIN — CARVEDILOL 25 MG: 25 TABLET, FILM COATED ORAL at 07:17

## 2019-01-15 RX ADMIN — IPRATROPIUM BROMIDE AND ALBUTEROL SULFATE 3 ML: .5; 3 SOLUTION RESPIRATORY (INHALATION) at 13:03

## 2019-01-15 RX ADMIN — ENOXAPARIN SODIUM 40 MG: 40 INJECTION SUBCUTANEOUS at 23:52

## 2019-01-15 RX ADMIN — Medication 10 ML: at 05:38

## 2019-01-15 RX ADMIN — Medication 10 ML: at 22:00

## 2019-01-15 RX ADMIN — PANTOPRAZOLE SODIUM 40 MG: 40 TABLET, DELAYED RELEASE ORAL at 07:16

## 2019-01-15 RX ADMIN — GUAIFENESIN 1200 MG: 600 TABLET, EXTENDED RELEASE ORAL at 08:18

## 2019-01-15 RX ADMIN — BISACODYL 10 MG: 5 TABLET, COATED ORAL at 23:52

## 2019-01-15 RX ADMIN — FUROSEMIDE 40 MG: 40 TABLET ORAL at 08:18

## 2019-01-15 RX ADMIN — ENOXAPARIN SODIUM 40 MG: 40 INJECTION SUBCUTANEOUS at 09:09

## 2019-01-15 RX ADMIN — DOCUSATE SODIUM 400 MG: 100 CAPSULE, LIQUID FILLED ORAL at 23:52

## 2019-01-15 RX ADMIN — LORATADINE 10 MG: 10 TABLET ORAL at 17:52

## 2019-01-15 RX ADMIN — IPRATROPIUM BROMIDE AND ALBUTEROL SULFATE 3 ML: .5; 3 SOLUTION RESPIRATORY (INHALATION) at 21:27

## 2019-01-15 RX ADMIN — GUAIFENESIN 1200 MG: 600 TABLET, EXTENDED RELEASE ORAL at 23:52

## 2019-01-15 RX ADMIN — ASPIRIN 81 MG: 81 TABLET, COATED ORAL at 08:18

## 2019-01-15 RX ADMIN — NYSTATIN: 100000 POWDER TOPICAL at 08:22

## 2019-01-15 RX ADMIN — CALCIUM CARBONATE 200 MG: 500 TABLET, CHEWABLE ORAL at 17:53

## 2019-01-15 RX ADMIN — WARFARIN SODIUM 7.5 MG: 2.5 TABLET ORAL at 17:52

## 2019-01-15 RX ADMIN — Medication 10 ML: at 13:08

## 2019-01-15 RX ADMIN — CEFTRIAXONE 1 G: 1 INJECTION, POWDER, FOR SOLUTION INTRAMUSCULAR; INTRAVENOUS at 23:56

## 2019-01-15 RX ADMIN — CALCIUM CARBONATE 200 MG: 500 TABLET, CHEWABLE ORAL at 11:06

## 2019-01-15 RX ADMIN — AZITHROMYCIN MONOHYDRATE 500 MG: 500 INJECTION, POWDER, LYOPHILIZED, FOR SOLUTION INTRAVENOUS at 23:53

## 2019-01-15 RX ADMIN — PREDNISONE 40 MG: 20 TABLET ORAL at 07:16

## 2019-01-15 RX ADMIN — NYSTATIN: 100000 POWDER TOPICAL at 17:54

## 2019-01-15 RX ADMIN — IPRATROPIUM BROMIDE AND ALBUTEROL SULFATE 3 ML: .5; 3 SOLUTION RESPIRATORY (INHALATION) at 07:26

## 2019-01-15 RX ADMIN — PRAVASTATIN SODIUM 40 MG: 20 TABLET ORAL at 23:52

## 2019-01-15 NOTE — PROGRESS NOTES
ABG drawn after PT finished working with him. SP02 stayed above 90% the entire time they PT was doing therapy.   Resting SP02 is 95% on 2 lpm

## 2019-01-15 NOTE — INTERDISCIPLINARY ROUNDS
Interdisciplinary team rounds were held 1/15/2019 with the following team members:Care Management, Nursing, Physical Therapy, Physician and Clinical Coordinator. Plan of care discussed. See clinical pathway and/or care plan for interventions and desired outcomes.

## 2019-01-15 NOTE — PROGRESS NOTES
Problem: Falls - Risk of 
Goal: *Absence of Falls Document Tom Greenjennifer Alvares Fall Risk and appropriate interventions in the flowsheet. Fall Risk Interventions: 
Mobility Interventions: Bed/chair exit alarm, Communicate number of staff needed for ambulation/transfer, OT consult for ADLs, Patient to call before getting OOB, PT Consult for mobility concerns, Strengthening exercises (ROM-active/passive) Mentation Interventions: Bed/chair exit alarm, Door open when patient unattended, Eyeglasses and hearing aids, Increase mobility, More frequent rounding, Room close to nurse's station, Update white board Medication Interventions: Bed/chair exit alarm, Evaluate medications/consider consulting pharmacy, Patient to call before getting OOB, Teach patient to arise slowly Elimination Interventions: Bed/chair exit alarm, Call light in reach, Patient to call for help with toileting needs, Toilet paper/wipes in reach, Toileting schedule/hourly rounds Problem: Pressure Injury - Risk of 
Goal: *Prevention of pressure injury Document Perez Scale and appropriate interventions in the flowsheet. Outcome: Progressing Towards Goal 
Pressure Injury Interventions: 
Sensory Interventions: Assess changes in LOC, Check visual cues for pain, Discuss PT/OT consult with provider, Float heels, Keep linens dry and wrinkle-free, Maintain/enhance activity level, Minimize linen layers, Monitor skin under medical devices, Pressure redistribution bed/mattress (bed type), Turn and reposition approx. every two hours (pillows and wedges if needed) Activity Interventions: Assess need for specialty bed, Increase time out of bed, Pressure redistribution bed/mattress(bed type), PT/OT evaluation Mobility Interventions: HOB 30 degrees or less, Pressure redistribution bed/mattress (bed type), PT/OT evaluation, Turn and reposition approx. every two hours(pillow and wedges) Nutrition Interventions: Document food/fluid/supplement intake, Discuss nutritional consult with provider, Offer support with meals,snacks and hydration Friction and Shear Interventions: Apply protective barrier, creams and emollients, Foam dressings/transparent film/skin sealants, HOB 30 degrees or less

## 2019-01-15 NOTE — PROGRESS NOTES
Critical Care Daily Progress Note: 1/15/2019 Johana Vasquez Admission Date: 1/10/2019 The patient's chart is reviewed and the patient is discussed with the staff. 
 
 
Padma Ajjaspreet chaudhry.o.  male  evaluated at the request of Dr. Idalmis Grant. Pt has a history of coronary artery disease, ischemic cardipmyopathy, hypertension, factor V Leiden, CABG, AICD for nonsustained V. tach, phlebitis, DVT, paroxysmal atrial fibrillation, aortic stenosis presents from his primary care office for pneumonia.  
Pt does have chronic sob on exertion since cabg- several years ago. Since past 2 weeks- nasal congestion,increased sob-- no fever or headache or dizziness or chest pain. Went to pcp1/10 for his INR check- daughter complained that pt was looking ill- wanted pcp to evaluate- found to be hypoxic - oxygen sat 83%- was given breathing treatment- was told that has pneumonia- sent to er for further evaluation. Subjective: Wore BIPAP overnight and now weaned to 2L NC. Pt reports he never needed oxygen in the past and never had LE swelling until injuring his L leg last June. Current Facility-Administered Medications Medication Dose Route Frequency  predniSONE (DELTASONE) tablet 40 mg  40 mg Oral DAILY WITH BREAKFAST  warfarin (COUMADIN) tablet 7.5 mg  7.5 mg Oral QPM  
 enoxaparin (LOVENOX) injection 40 mg  40 mg SubCUTAneous Q24H  
 albuterol-ipratropium (DUO-NEB) 2.5 MG-0.5 MG/3 ML  3 mL Nebulization Q6H RT  
 pantoprazole (PROTONIX) tablet 40 mg  40 mg Oral ACB  calcium carbonate (TUMS) chewable tablet 200 mg [elemental]  200 mg Oral TID WITH MEALS  
 azithromycin (ZITHROMAX) 500 mg in 0.9% sodium chloride (MBP/ADV) 250 mL  500 mg IntraVENous Q24H  cefTRIAXone (ROCEPHIN) 1 g in 0.9% sodium chloride (MBP/ADV) 50 mL  1 g IntraVENous Q24H  nystatin (MYCOSTATIN) 100,000 unit/gram powder   Topical BID  influenza vaccine 2018-19 (6 mos+)(PF) (FLUARIX QUAD/FLULAVAL QUAD) injection 0.5 mL  0.5 mL IntraMUSCular PRIOR TO DISCHARGE  sodium chloride (NS) flush 10 mL  10 mL InterCATHeter Q8H  
 sodium chloride (NS) flush 10 mL  10 mL InterCATHeter PRN  
 bisacodyl (DULCOLAX) tablet 10 mg  10 mg Oral QHS  docusate sodium (COLACE) capsule 400 mg  400 mg Oral QHS  sodium chloride (NS) flush 5-40 mL  5-40 mL IntraVENous PRN  
 amLODIPine (NORVASC) tablet 10 mg  10 mg Oral DAILY  aspirin delayed-release tablet 81 mg  81 mg Oral DAILY  carvedilol (COREG) tablet 25 mg  25 mg Oral BID WITH MEALS  guaiFENesin ER (MUCINEX) tablet 1,200 mg  1,200 mg Oral BID  
 HYDROcodone-acetaminophen (NORCO)  mg tablet 1 Tab  1 Tab Oral Q6H PRN  
 lisinopril (PRINIVIL, ZESTRIL) tablet 20 mg  20 mg Oral DAILY  pravastatin (PRAVACHOL) tablet 40 mg  40 mg Oral QHS  sodium chloride (NS) flush 5-40 mL  5-40 mL IntraVENous PRN  
 acetaminophen (TYLENOL) tablet 650 mg  650 mg Oral Q4H PRN  
 morphine injection 1 mg  1 mg IntraVENous Q4H PRN  
 naloxone (NARCAN) injection 0.4 mg  0.4 mg IntraVENous PRN  
 ondansetron (ZOFRAN) injection 4 mg  4 mg IntraVENous Q4H PRN  
 hydrALAZINE (APRESOLINE) 20 mg/mL injection 10 mg  10 mg IntraVENous Q6H PRN  
 loratadine (CLARITIN) tablet 10 mg  10 mg Oral DAILY Review of Systems Constitutional:  negative for fever, chills, sweats Cardiovascular:  negative for chest pain, palpitations, syncope: + edema Gastrointestinal:  negative for dysphagia, reflux, vomiting, diarrhea, abdominal pain, or melena Neurologic:  negative for focal weakness, numbness, headache Objective:  
 
Vitals:  
 01/15/19 9506 01/15/19 0701 01/15/19 3757 01/15/19 7026 BP: 106/58 102/60 102/60 Pulse: 67 73 75 Resp: 26 (!) 33 Temp:  97.9 °F (36.6 °C) SpO2: 93% 95%  95% Weight:      
 
 
Intake and Output:  
01/13 1901 - 01/15 0700 In: 1980 [P.O.:1080; I.V.:900] Out: 2925 [Urine:2925] No intake/output data recorded. Physical Exam:         
Constitutional:  the patient is obese and in no acute distress on 2L  
EENMT:  Sclera clear, pupils equal, oral mucosa moist 
Respiratory: BS decreased but clear Cardiovascular:  RRR without M,G,R 
Gastrointestinal: soft and non-tender; with positive bowel sounds. Musculoskeletal: warm without cyanosis. There is + lower leg edema. Skin:  no jaundice or rashes, no wounds Neurologic: no gross neuro deficits Psychiatric:  alert and oriented x 3 LINES: 
peripheral 
 
DRIPS: 
none CXR:  1/12: 
 
 
LAB No results for input(s): GLUCPOC in the last 72 hours. No lab exists for component: [place] Recent Labs  
  01/15/19 
7430 01/14/19 
0412 01/13/19 
0341 WBC 10.2  --   --   
HGB 13.6  --   --   
HCT 45.1  --   --   
  --   --   
INR 1.4 1.2 1.2 Recent Labs  
  01/15/19 
6377 01/14/19 
0412 01/13/19 
0341  138 137  
K 4.2 4.2 3.8  101 98 CO2 32 30 31 * 153* 164* BUN 47* 40* 35* CREA 1.31 1.34 1.27  
CA 8.0* 8.1* 8.6 No results for input(s): PH, PCO2, PO2, HCO3, PHI, PCO2I, PO2I, HCO3I in the last 72 hours. No results for input(s): LCAD, LAC in the last 72 hours. No results for input(s): PH, PCO2, PO2, HCO3 in the last 72 hours. Assessment:  (Medical Decision Making) Hospital Problems  Date Reviewed: 10/2/2018 Codes Class Noted POA  
 [facility] ICD-10-CM: J44.1 ICD-9-CM: 491.21  1/11/2019 Unknown Better -no wheezing. Can decrease steroids. Had restrictive pattern on CPFTs in 2015. Hyperkalemia ICD-10-CM: E87.5 ICD-9-CM: 276.7  1/10/2019 Unknown Resolved Hypoxia ICD-10-CM: R09.02 
ICD-9-CM: 799.02  1/10/2019 Unknown On nc.   
 * (Principal) PNA (pneumonia) ICD-10-CM: J18.9 ICD-9-CM: 852  1/10/2019 Unknown Respiratory failure with hypercapnia ([facility] ICD-10-CM: J96.92 
ICD-9-CM: 518.81  1/10/2019 Unknown Improved. Need to recheck ABG after pt is OOB. Essential hypertension ICD-10-CM: I10 
ICD-9-CM: 401.9  2/6/2017 Yes Chronic systolic congestive heart failure (HCC) ICD-10-CM: I50.22 ICD-9-CM: 428.22, 428.0  10/27/2015 Yes AICD (automatic cardioverter/defibrillator) present ICD-10-CM: Z95.810 ICD-9-CM: V45.02  10/27/2015 Yes Paroxysmal atrial fibrillation (HCC) ICD-10-CM: I48.0 ICD-9-CM: 427.31  10/27/2015 Yes Aortic stenosis, mild to moderate ICD-10-CM: I35.0 ICD-9-CM: 424.1  10/27/2015 Yes Chronic back pain (Chronic) ICD-10-CM: M54.9, G89.29 ICD-9-CM: 724.5, 338.29  2/12/2015 Yes Obesity (Chronic) ICD-10-CM: I97.2 ICD-9-CM: 278.00  8/17/2012 Yes Ongoing. Coronary artery disease (Chronic) ICD-10-CM: I25.10 ICD-9-CM: 414.00  8/17/2012 Yes Overview Signed 8/17/2012  3:57 PM by Arie Parisi Diagnosed 1999, CABG 12/2011 Cardiomyopathy, ischemic (Chronic) ICD-10-CM: I25.5 ICD-9-CM: 414.8  8/17/2012 Yes Overview Signed 8/17/2012  3:58 PM by Arie Parisi Pacemaker AICD 12/2011,  35% LUEF Factor 5 Leiden mutation, heterozygous ([facility] (Chronic) ICD-10-CM: J19.93 
ICD-9-CM: 289.81  8/17/2012 Yes Overview Signed 8/17/2012  4:02 PM by Arie Parisi Chronic anticoag., hx DVT Hyperlipidemia (Chronic) ICD-10-CM: B47.0 ICD-9-CM: 272.4  8/17/2012 Yes Probable JACOB Obesity hypoventilation- likely Plan:  (Medical Decision Making) Check ABG once OOB. Check TSH to rule out hypothyroidism as cause of hypoventilation and edema. Continue BIPAP qHS. Complete Abx tomorrow. Wean prednisone. Check bedside spirometry. More than 50% of the time documented was spent in face-to-face contact with the patient and in the care of the patient on the floor/unit where the patient is located.  
 
Quinten Dancer, MD

## 2019-01-15 NOTE — PROGRESS NOTES
Care Management Interventions PCP Verified by CM: Yes Mode of Transport at Discharge: BLS Transition of Care Consult (CM Consult): LTAC Current Support Network: Own Home Confirm Follow Up Transport: Family Plan discussed with Pt/Family/Caregiver: Yes Freedom of Choice Offered: Yes Discharge Location Discharge Placement: [facility]) Visited with pt regarding plans for discharge, pt agrees that he is in need of rehab, [facility] looking at pt and he is agreeable to go. Will continue to follow and assist in d/c plans. 1/16 @0830 Saw pt in interdisciplinarily rounds, plan of care and discharge date/ location discussed. Per the hospitalitis, Pulmonary wants to attempt CPAP for pt tonight, b/c he is not tolerating the BIPAP. May move to a m/s bed tomorrow(thurs) and d/c to [facility] if we have ins approval. 
 
1/18 1320 Recieved word that pt has been denied form [facility] per ins. 1/8/ @ 1400 [facility] If [facility] does not get ins approval [facility] Monday. I have ordered a overnight oximetry via RT, he will also need a 3- step qualifier.

## 2019-01-15 NOTE — PROGRESS NOTES
01/15/19 1000 Oxygen Therapy O2 Sat (%) 94 % Pulse via Oximetry 73 beats per minute O2 Device Nasal cannula O2 Flow Rate (L/min) 2 l/min Pre-Treatment Pre FEV1 (liters) 1 liters % Predicted 42

## 2019-01-15 NOTE — PROGRESS NOTES
Bedside, Verbal and Written shift change report given to [facility], RN by Hiral Ellis RN. Report included the following information SBAR, Kardex, ED Summary, Intake/Output, MAR, Accordion, Recent Results, Med Rec Status, Cardiac Rhythm Paced Atrial fibrillation , Alarm Parameters  and Quality Measures.

## 2019-01-15 NOTE — PROGRESS NOTES
Hospitalist Progress Note Admit Date:  1/10/2019  4:02 PM  
Name:  Miladis Parikh Age:  77 y.o. 
:  1940 MRN:  [id number] PCP:  Rebecca Hendricks MD 
Treatment Team: Attending Provider: Dennis Douglass MD; Consulting Provider: Jamir Ortiz MD; Utilization Review: Trevin Luis RN; Hospitalist: Viv Davis MD 
 
Subjective:  
Patient admitted 1/10 for acute hypoxic respiratory failure and CAP. 
 
: Bipap qhs. 2L NC during the day. Still has some LE edema but his breathing is better. Improving overall. ROS otherwise negative. Objective:  
 
Patient Vitals for the past 24 hrs: 
 Temp Pulse Resp BP SpO2  
01/15/19 1500  82 29 126/65 93 % 01/15/19 1430  79 21 123/54 93 % 01/15/19 1400  85 (!) 43 118/65 94 % 01/15/19 1330  82 (!) 47 104/63 94 % 01/15/19 1306     94 % 01/15/19 1300  77 29 (!) 87/52 94 % 01/15/19 1230  72 (!) 48 104/52 92 % 01/15/19 1200  73 27 97/63 94 % 01/15/19 1130 98 °F (36.7 °C) 69 30 103/49 92 % 01/15/19 1100  74 (!) 36 107/52 94 % 01/15/19 1043  64 29 106/51 92 % 01/15/19 1000  72 (!) 37 104/54 94 % 01/15/19 0930  67 28 102/55 92 % 01/15/19 0909  74 27 90/54 93 % 01/15/19 0900  65 28 108/52 93 % 01/15/19 0830  62 (!) 44 92/51 94 % 01/15/19 0822  80 (!) 33 93/58 95 % 01/15/19 0818  71  91/56   
01/15/19 0801  68 (!) 38 97/50 94 % 01/15/19 0726     95 % 01/15/19 0716  75  102/60   
01/15/19 0701 97.9 °F (36.6 °C) 73 (!) 33 102/60 95 % 01/15/19 0601  67 26 106/58 93 % 01/15/19 0504  72 26 109/57 95 % 01/15/19 0401  72 24 115/56 94 % 01/15/19 0335 98 °F (36.7 °C) 90 25 119/54 96 % 01/15/19 0301  66 25 106/54 98 % 01/15/19 0201  68 19 119/50 99 % 01/15/19 0144     98 % 01/15/19 0101  66 21 106/47 97 % 01/15/19 0001  61 21 144/59 99 % 19 2330     100 % 19 2301 98.1 °F (36.7 °C) 69 21 121/59 96 % 19 2202  74 26 111/57 97 % 01/14/19 2101  74 26 102/45 97 % 01/14/19 2022  67 24 102/45 98 % 01/14/19 1959     97 % 01/14/19 1932 98.6 °F (37 °C) (!) 59 23 (!) 87/53 97 % 01/14/19 1903  67 22 (!) 83/50 95 % 01/14/19 1801  82 27 120/61 93 % 01/14/19 1721  80 (!) 45 113/71 95 % 01/14/19 1701  68 (!) 33 99/51 97 % 01/14/19 1601  67  111/58 92 % Oxygen Therapy O2 Sat (%): 93 % (01/15/19 1500) Pulse via Oximetry: 84 beats per minute (01/15/19 1500) O2 Device: Nasal cannula (01/15/19 1306) O2 Flow Rate (L/min): 2 l/min (01/15/19 1306) O2 Temperature: 87.4 °F (30.8 °C) (01/15/19 0144) FIO2 (%): 28 % (01/15/19 1306) Intake/Output Summary (Last 24 hours) at 1/15/2019 1556 Last data filed at 1/15/2019 1436 Gross per 24 hour Intake 660 ml Output 3100 ml Net -2440 ml  
   
*Note that automatically entered I/Os may not be accurate; dependent on patient compliance with collection and accurate  by assistants. General:    Well nourished. Alert. CV:   RRR. No murmur, rub, or gallop. Lungs:   CTAB. No wheezing, rhonchi, or rales. 2L NC, sats mid 90s. Abdomen:   Soft, nontender, nondistended. Extremities: Warm and dry. 1-2+ pitting edema b/l. Skin:     No rashes or jaundice. Neuro:  No gross focal deficits Data Review: 
I have reviewed all labs, meds, telemetry events, and studies from the last 24 hours: 
 
Recent Results (from the past 24 hour(s)) PROTHROMBIN TIME + INR Collection Time: 01/15/19  6:32 AM  
Result Value Ref Range Prothrombin time 17.7 (H) 11.7 - 14.5 sec INR 1.4 METABOLIC PANEL, BASIC Collection Time: 01/15/19  6:32 AM  
Result Value Ref Range Sodium 139 136 - 145 mmol/L Potassium 4.2 3.5 - 5.1 mmol/L Chloride 103 98 - 107 mmol/L  
 CO2 32 21 - 32 mmol/L Anion gap 4 mmol/L Glucose 127 (H) 65 - 100 mg/dL BUN 47 (H) 8 - 23 MG/DL Creatinine 1.31 0.8 - 1.5 MG/DL  
 GFR est AA >60 >60 ml/min/1.73m2 GFR est non-AA 56 ml/min/1.73m2 Calcium 8.0 (L) 8.3 - 10.4 MG/DL  
CBC WITH AUTOMATED DIFF Collection Time: 01/15/19  6:32 AM  
Result Value Ref Range WBC 10.2 4.3 - 11.1 K/uL  
 RBC 4.62 4.23 - 5.6 M/uL  
 HGB 13.6 13.6 - 17.2 g/dL HCT 45.1 41.1 - 50.3 % MCV 97.6 79.6 - 97.8 FL  
 MCH 29.4 26.1 - 32.9 PG  
 MCHC 30.2 (L) 31.4 - 35.0 g/dL  
 RDW 14.6 11.9 - 14.6 % PLATELET 389 381 - 942 K/uL MPV 10.5 9.4 - 12.3 FL ABSOLUTE NRBC 0.00 0.0 - 0.2 K/uL  
 DF AUTOMATED NEUTROPHILS 72 43 - 78 % LYMPHOCYTES 16 13 - 44 % MONOCYTES 12 4.0 - 12.0 % EOSINOPHILS 0 (L) 0.5 - 7.8 % BASOPHILS 0 0.0 - 2.0 % IMMATURE GRANULOCYTES 1 0.0 - 5.0 %  
 ABS. NEUTROPHILS 7.3 1.7 - 8.2 K/UL  
 ABS. LYMPHOCYTES 1.6 0.5 - 4.6 K/UL  
 ABS. MONOCYTES 1.2 0.1 - 1.3 K/UL  
 ABS. EOSINOPHILS 0.0 0.0 - 0.8 K/UL  
 ABS. BASOPHILS 0.0 0.0 - 0.2 K/UL  
 ABS. IMM. GRANS. 0.1 0.0 - 0.5 K/UL BNP Collection Time: 01/15/19  6:32 AM  
Result Value Ref Range  pg/mL TSH 3RD GENERATION Collection Time: 01/15/19  6:32 AM  
Result Value Ref Range TSH 0.619 uIU/mL POC G3 Collection Time: 01/15/19 10:17 AM  
Result Value Ref Range Device: NASAL CANNULA pH (POC) 7.311 (L) 7.35 - 7.45    
 pCO2 (POC) 49.5 (H) 35 - 45 MMHG  
 pO2 (POC) 67 (L) 75 - 100 MMHG  
 HCO3 (POC) 25.0 22 - 26 MMOL/L  
 sO2 (POC) 91 (L) 95 - 98 % Base deficit (POC) 2 mmol/L Allens test (POC) YES Site LEFT RADIAL Patient temp. 98.6 Specimen type (POC) ARTERIAL Performed by Jaida   
 CO2, POC 26 MMOL/L Flow rate (POC) 2.000 L/min Critical value read back 00:01 COLLECT TIME 1,012 All Micro Results None Results for orders placed or performed during the hospital encounter of 01/10/19  
2D ECHO COMPLETE ADULT (TTE) W OR WO CONTR Narrative [facility] [address] [place], [address] [facility] 
[phone] Transthoracic Echocardiogram 
2D, M-mode, Doppler, and Color Doppler Patient: Andrey Go 
MR #: [id number] : 1940 Age: 77 years Gender: Male Study date: 2019 Account #: [de-identified] Height: 72 in 
Weight: 278.5 lb 
BSA: 2.45 mï¾² Status:Routine Location: [facility] BP: 105/ 59 Allergies: NO KNOWN ALLERGENS Sonographer:  Wyatt Betts RDCS Group:  [facility] Referring Physician:  Chelsy Jean-Baptiste MD 
Reading Physician:  Melvin Crowell MD [facility] INDICATIONS: CHF PROCEDURE: This was a routine study. A transthoracic echocardiogram was 
performed. The study included complete 2D imaging, M-mode, complete spectral 
Doppler, and color Doppler. Intravenous contrast (Definity, 3 ml) was 
administered. Echocardiographic views were limited by poor acoustic window 
availability and poor body habitus. This was a technically difficult study. LEFT VENTRICLE: Size was at the upper limits of normal. Systolic function was 
mildly to moderately reduced. Ejection fraction was estimated in the range of 
40 % to 45 %. There was moderate hypokinesis of the basal inferoseptal  
wall(s). There was akinesis of the basal-mid inferior wall(s). There was moderate 
concentric hypertrophy. Left ventricular diastolic dysfunction Grade 2. Average E/e' of 26.7. RIGHT VENTRICLE: The ventricle was dilated. Systolic function was low normal. 
 A 
pacing wire was present. LEFT ATRIUM: The atrium was moderately to markedly dilated. RIGHT ATRIUM: Not well visualized. SYSTEMIC VEINS: IVC: The inferior vena cava was dilated. The respirophasic 
change in diameter was less than 50%. AORTIC VALVE: The valve was trileaflet. DI: 0.43. The aortic valve area by  
the 
continuity equation was 1.46 cm2. The peak velocity was 3.24 m/s. The mean 
pressure gradient was 19 mmHg. The peak pressure gradient was 42 mmHg. There 
was no insufficiency. MITRAL VALVE: Valve structure was normal. There was no evidence for stenosis. There was trivial regurgitation. TRICUSPID VALVE: The valve structure was normal. There was no evidence for 
stenosis. There was trivial regurgitation. PULMONIC VALVE: Not well visualized. PERICARDIUM: There was no pericardial effusion. AORTA: The root exhibited normal size. SUMMARY: 
 
-  Left ventricle: Size was at the upper limits of normal. Systolic function 
was mildly to moderately reduced. Ejection fraction was estimated in the  
range 
of 40 % to 45 %. There was moderate hypokinesis of the basal inferoseptal 
wall(s). There was akinesis of the basal-mid inferior wall(s). There was 
moderate concentric hypertrophy. 
 
-  Right ventricle: The ventricle was dilated. -  Left atrium: The atrium was moderately to markedly dilated. -  Inferior vena cava, hepatic veins: The inferior vena cava was dilated. The 
respirophasic change in diameter was less than 50%. -  Aortic valve: The aortic valve area by the continuity equation was 1.46  
cm2. SYSTEM MEASUREMENT TABLES 
 
2D mode Left Atrium Systolic Volume Index; Method of Disks, Biplane; 2D mode;: 55.9 
ml/m2 IVS/LVPW (2D): 0.9 IVSd (2D): 1.9 cm LVIDd (2D): 5.5 cm LVIDs (2D): 5 cm 
LVOT Area (2D): 4.5 cm2 LVPWd (2D): 2 cm RVIDd (2D): 3.7 cm Unspecified Scan Mode Peak Grad; Mean; Antegrade Flow: 38 mm[Hg] Vmax; Antegrade Flow: 292 cm/s LVOT Diam: 2.4 cm Prepared and signed by 
 
Jose Block. Pretty Bedolla MD [facility] Signed 14-Jan-2019 16:10:12 Current Meds: 
Current Facility-Administered Medications Medication Dose Route Frequency  [START ON 1/16/2019] predniSONE (DELTASONE) tablet 20 mg  20 mg Oral DAILY WITH BREAKFAST  albuterol-ipratropium (DUO-NEB) 2.5 MG-0.5 MG/3 ML  3 mL Nebulization QID RT  
 enoxaparin (LOVENOX) injection 40 mg  40 mg SubCUTAneous Q12H  warfarin (COUMADIN) tablet 7.5 mg  7.5 mg Oral QPM  
  pantoprazole (PROTONIX) tablet 40 mg  40 mg Oral ACB  calcium carbonate (TUMS) chewable tablet 200 mg [elemental]  200 mg Oral TID WITH MEALS  
 azithromycin (ZITHROMAX) 500 mg in 0.9% sodium chloride (MBP/ADV) 250 mL  500 mg IntraVENous Q24H  cefTRIAXone (ROCEPHIN) 1 g in 0.9% sodium chloride (MBP/ADV) 50 mL  1 g IntraVENous Q24H  nystatin (MYCOSTATIN) 100,000 unit/gram powder   Topical BID  influenza vaccine 2018-19 (6 mos+)(PF) (FLUARIX QUAD/FLULAVAL QUAD) injection 0.5 mL  0.5 mL IntraMUSCular PRIOR TO DISCHARGE  sodium chloride (NS) flush 10 mL  10 mL InterCATHeter Q8H  
 sodium chloride (NS) flush 10 mL  10 mL InterCATHeter PRN  
 bisacodyl (DULCOLAX) tablet 10 mg  10 mg Oral QHS  docusate sodium (COLACE) capsule 400 mg  400 mg Oral QHS  sodium chloride (NS) flush 5-40 mL  5-40 mL IntraVENous PRN  
 amLODIPine (NORVASC) tablet 10 mg  10 mg Oral DAILY  aspirin delayed-release tablet 81 mg  81 mg Oral DAILY  carvedilol (COREG) tablet 25 mg  25 mg Oral BID WITH MEALS  guaiFENesin ER (MUCINEX) tablet 1,200 mg  1,200 mg Oral BID  
 HYDROcodone-acetaminophen (NORCO)  mg tablet 1 Tab  1 Tab Oral Q6H PRN  
 lisinopril (PRINIVIL, ZESTRIL) tablet 20 mg  20 mg Oral DAILY  pravastatin (PRAVACHOL) tablet 40 mg  40 mg Oral QHS  sodium chloride (NS) flush 5-40 mL  5-40 mL IntraVENous PRN  
 acetaminophen (TYLENOL) tablet 650 mg  650 mg Oral Q4H PRN  
 morphine injection 1 mg  1 mg IntraVENous Q4H PRN  
 naloxone (NARCAN) injection 0.4 mg  0.4 mg IntraVENous PRN  
 ondansetron (ZOFRAN) injection 4 mg  4 mg IntraVENous Q4H PRN  
 hydrALAZINE (APRESOLINE) 20 mg/mL injection 10 mg  10 mg IntraVENous Q6H PRN  
 loratadine (CLARITIN) tablet 10 mg  10 mg Oral DAILY Other Studies (last 24 hours): No results found. Assessment and Plan:  
 
Hospital Problems as of 1/15/2019 Date Reviewed: 10/2/2018 Codes Class Noted - Resolved POA Obesity hypoventilation syndrome (HCC) ICD-10-CM: Z25.3 ICD-9-CM: 278.03  1/14/2019 - Present Unknown COPD exacerbation ([facility] ICD-10-CM: J44.1 ICD-9-CM: 491.21  1/11/2019 - Present Unknown Hyperkalemia ICD-10-CM: E87.5 ICD-9-CM: 276.7  1/10/2019 - Present Unknown Hypoxia ICD-10-CM: R09.02 
ICD-9-CM: 799.02  1/10/2019 - Present Unknown * (Principal) PNA (pneumonia) ICD-10-CM: J18.9 ICD-9-CM: 306  1/10/2019 - Present Unknown Respiratory failure with hypercapnia ([facility] ICD-10-CM: Y73.82 
ICD-9-CM: 518.81  1/10/2019 - Present Unknown Essential hypertension ICD-10-CM: I10 
ICD-9-CM: 401.9  2/6/2017 - Present Yes Chronic systolic congestive heart failure (HCC) ICD-10-CM: I50.22 ICD-9-CM: 428.22, 428.0  10/27/2015 - Present Yes AICD (automatic cardioverter/defibrillator) present ICD-10-CM: Z95.810 ICD-9-CM: V45.02  10/27/2015 - Present Yes Paroxysmal atrial fibrillation (HCC) ICD-10-CM: I48.0 ICD-9-CM: 427.31  10/27/2015 - Present Yes Aortic stenosis, mild to moderate ICD-10-CM: I35.0 ICD-9-CM: 424.1  10/27/2015 - Present Yes Osteoarthritis (Chronic) ICD-10-CM: M19.90 ICD-9-CM: 715.90  2/12/2015 - Present Overview Signed 10/27/2015  9:58 AM by Eloise Calvo With severe chronic low back pain. Chronic back pain (Chronic) ICD-10-CM: M54.9, G89.29 ICD-9-CM: 724.5, 338.29  2/12/2015 - Present Yes Obesity (Chronic) ICD-10-CM: M52.6 ICD-9-CM: 278.00  8/17/2012 - Present Yes Coronary artery disease (Chronic) ICD-10-CM: I25.10 ICD-9-CM: 414.00  8/17/2012 - Present Yes Overview Signed 8/17/2012  3:57 PM by Ranjeet Valdez Diagnosed 1999, CABG 12/2011 Cardiomyopathy, ischemic (Chronic) ICD-10-CM: I25.5 ICD-9-CM: 414.8  8/17/2012 - Present Yes Overview Signed 8/17/2012  3:58 PM by Ranjeet Valdez   Pacemaker AICD 12/2011,  35% LUEF 
  
  
   
 Factor 5 Leiden mutation, heterozygous ([facility] (Chronic) ICD-10-CM: K95.18 
ICD-9-CM: 289.81  8/17/2012 - Present Yes Overview Signed 8/17/2012  4:02 PM by Charla Jimenez Chronic anticoag., hx DVT Hyperlipidemia (Chronic) ICD-10-CM: E03.7 ICD-9-CM: 272.4  8/17/2012 - Present Yes Plan: # Acute hypoxic/hypercapnic respiratory failure - Bipap qhs; 2L NC during the day currently - Appreciate pulm -- ABG and spirometry today # CAP 
 - ceftriaxone/azithro # COPD 
 - po steroids, nebs # Chronic dCHF 
 - euvolemic; home meds 
 - TTE unchanged from 2017 # HTN 
 - home meds # H/o DVT/Factor V 
 - warfarin DC planning/Dispo:   
Diet:  DIET CARDIAC 
DVT ppx:  warfarin Signed: 
Viktoria Navarro MD

## 2019-01-15 NOTE — PROGRESS NOTES
Warfarin dosing per pharmacist 
 
Dax Hinson is a 77 y.o. male. @JYTQ(32)@    @YQOF(22)@ Indication:  atrial fibrillation Goal INR:  2 - 3 Home dose:   5 - 10 mg? Risk factors or significant drug interactions:  macrolide antibiotics Other anticoagulants:  none Daily Monitoring Date  INR     Warfarin dose HGB              Notes 1/15                 1.4                7.5 mg 
1/14                 1.2                7.5 mg                
1/13                 1.2                  5 mg                 
1/12                 1.2                  5 mg                14 
1/11                 1.5                  5 mg                14.5 
1/10  1.6  5 mg  15.2 Will continue with Warfarin to 7.5 mg every evening. Will continue to monitor closely. Thank you, Susan MerrillD, BCPS

## 2019-01-15 NOTE — PROGRESS NOTES
Problem: Mobility Impaired (Adult and Pediatric) Goal: *Acute Goals and Plan of Care (Insert Text) STG: 
(1.)Mr. Marily Guallpa will move from supine to sit and sit to supine  with MINIMAL ASSIST within 3 treatment day(s). (2.)Mr. Marily Guallpa will transfer from bed to chair and chair to bed with MODERATE ASSIST using the least restrictive device within 3 treatment day(s). Met 1/14 LTG: 
(1.)Mr. Marily Guallpa will move from supine to sit and sit to supine  in bed with STAND BY ASSIST within 5-7 treatment day(s). (2.)Mr. Marily Guallpa will transfer from bed to chair and chair to bed with CONTACT GUARD ASSIST using the least restrictive device within 5-7 treatment day(s). (3.)Mr. Marily Guallpa will ambulate with MIN TO MODERATE ASSIST for 15 feet with the least restrictive device within 5-7  treatment day(s). ________________________________________________________________________________________________ PHYSICAL THERAPY: Daily Note, Treatment Day: 3rd, AM 1/15/2019INPATIENT: Hospital Day: 6 Payor: Vel Tadeo / Plan: [address] / Product Type: [employer] Medicare /  
  
NAME/AGE/GENDER: Radha Magana is a 77 y.o. male PRIMARY DIAGNOSIS: PNA (pneumonia) [J18.9] Hypoxia [R09.02] Hyperkalemia [E87.5] Respiratory failure with hypercapnia (HCC) [J96.92] PNA (pneumonia) PNA (pneumonia) ICD-10: Treatment Diagnosis:  
 · Generalized Muscle Weakness (M62.81) · Other abnormalities of gait and mobility (R26.89) Precaution/Allergies: 
Patient has no known allergies. ASSESSMENT:  
Mr. Marily Guallpa continue to make progress with ambulation and gait. This section established at most recent assessment PROBLEM LIST (Impairments causing functional limitations): 1. Decreased Strength 2. Decreased ADL/Functional Activities 3. Decreased Transfer Abilities 4. Decreased Ambulation Ability/Technique 5. Decreased Balance 6. Decreased Activity Tolerance 7. Increased Shortness of Breath 8. Decreased [place] with Home Exercise Program 
 INTERVENTIONS PLANNED: (Benefits and precautions of physical therapy have been discussed with the patient.) 1. Balance Exercise 2. Bed Mobility 3. Gait Training 4. Home Exercise Program (HEP) 5. Therapeutic Activites 6. Therapeutic Exercise/Strengthening 7. Transfer Training TREATMENT PLAN: Frequency/Duration: daily for duration of hospital stay Rehabilitation Potential For Stated Goals: Fair RECOMMENDED REHABILITATION/EQUIPMENT: (at time of discharge pending progress): Due to the probability of continued deficits (see above) this patient will likely need continued skilled physical therapy after discharge. Equipment:  
? Walkers, Type: Rolling Walker;   
    
 
 
 
HISTORY:  
History of Present Injury/Illness (Reason for Referral): 
Patient states he has a history of falls; sometimes he can walk and sometimes he can't. Patient is not a good historian. Family not present during evaluation. PER MD NOTES: 
 \"77year-old male history of coronary artery disease, ischemic cardipmyopathy, hypertension, factor V Leiden, CABG, AICD for nonsustained V. tach, phlebitis, DVT, paroxysmal atrial fibrillation, aortic stenosis presents from his primary care office for pneumonia. Pt does have chronic sob on exertion since cabg- several years ago. Since past 2 weeks- nasal congestion,increased sob-- no fever or headache or dizziness or chest pain. Went to pcp today for his INR check- daughter complained that pt was looking ill- wanted pcp to evaluate- found to be hypoxic - oxygen sat 83%- was given breathing treatment- was told that has pneumonia- sent to er for further evaluation. In er found to have oxygen saturation 89- cxr- cardiomegaly with infiltrate- started in rocephin and zithromax. Mild elevated d-dimer- ordered ct chest with contrast - pending. Pt will be admitted for acute on chronic sob-secondary to pneumonia. \" 
 
 Past Medical History/Comorbidities:  
Mr. Zeina Gleason  has a past medical history of AICD (automatic cardioverter/defibrillator) present, Aortic stenosis, mild to moderate, Cardiomyopathy, ischemic, Carotid occlusion, bilateral, Chronic back pain, Chronic rhinitis, Chronic systolic congestive heart failure ([address] Coronary artery disease, DVT, lower extremity, recurrent ([address] Factor 5 Leiden mutation, heterozygous ([address] Heart attack ([address] History of alcoholism ([facility] History of complete eye exam, smoking, Hyperlipidemia, Hypertension, NSVT (nonsustained ventricular tachycardia) ([facility] [address] [facility] Obesity, Osteoarthritis, Pacemaker, Paroxysmal atrial fibrillation ([address] Phlebitis and thrombophlebitis, S/P total knee arthroplasty, Tobacco abuse, and Wears dentures. Mr. Zeina Gleason  has a past surgical history that includes hx cataract removal; hx lipoma resection (1998); hx angioplasty (1999); hx implantable cardioverter defibrillator (12/18/2011); hx coronary artery bypass graft (x 3, 12/2011); hx orthopaedic (Left); hx lymph node dissection; and LEFT KNEE ARTHROPLASTY TOTAL / Sherryle Slough / [facility] (Left, 2/26/2015). Social History/Living Environment:  
Home Environment: Apartment # Steps to Enter: 14 
Rails to Enter: Yes One/Two Story Residence: Two story(lives on second floor) Living Alone: Yes Support Systems: Friends \ neighbors Patient Expects to be Discharged to[de-identified] Rehabilitation facility Current DME Used/Available at Home: [employer], bedsidehas a daughter; unclear about living situation. Prior Level of Function/Work/Activity: 
Patient says he was able to ambulate but has history of falls. Dominant Side:  
      RIGHT Personal Factors:   
      Sex:  male Age:  77 y.o. Number of Personal Factors/Comorbidities that affect the Plan of Care: 1-2: MODERATE COMPLEXITY EXAMINATION:  
Most Recent Physical Functioning:  
Gross Assessment: 3-/5 throughout Balance: 
  Bed Mobility: Rolling: Minimum assistance Supine to Sit: (sitting up in chair) Transfers: 
Sit to Stand: Minimum assistance Stand to Sit: Minimum assistance Bed to Chair: Minimum assistance Duration: 30 Minutes Gait:Patient unable to ambulate at this time. Speed/Blanca: Shuffled; Slow Step Length: Left shortened;Right shortened Gait Abnormalities: Decreased step clearance Distance (ft): 40 Feet (ft)(around in the room) Assistive Device: Walker, rolling Ambulation - Level of Assistance: Minimal assistance Body Structures Involved: 1. Nerves 2. Heart 3. Lungs 4. Joints 5. Muscles Body Functions Affected: 1. Sensory/Pain 2. Neuromusculoskeletal 
3. Movement Related Activities and Participation Affected: 1. General Tasks and Demands 2. Mobility 3. Self Care 4. Domestic Life 5. Community, Social and [place] Number of elements that affect the Plan of Care: 3: MODERATE COMPLEXITY CLINICAL PRESENTATION:  
Presentation: Evolving clinical presentation with changing clinical characteristics: MODERATE COMPLEXITY CLINICAL DECISION MAKING:  
[facility] 6 Clicks Basic Mobility Inpatient Short Form How much difficulty does the patient currently have. .. Unable A Lot A Little None 1. Turning over in bed (including adjusting bedclothes, sheets and blankets)? [] 1   [x] 2   [] 3   [] 4  
2. Sitting down on and standing up from a chair with arms ( e.g., wheelchair, bedside commode, etc.)   [] 1   [x] 2   [] 3   [] 4  
3. Moving from lying on back to sitting on the side of the bed? [] 1   [x] 2   [] 3   [] 4 How much help from another person does the patient currently need. .. Total A Lot A Little None 4. Moving to and from a bed to a chair (including a wheelchair)? [x] 1   [] 2   [] 3   [] 4  
5. Need to walk in hospital room? [x] 1   [] 2   [] 3   [] 4  
6. Climbing 3-5 steps with a railing?    [x] 1   [] 2   [] 3   [] 4  
 © 2007, Trustees of [address] [place] [address] [address] [address], under license to [employer]. All rights reserved Score:  Initial: 9 Most Recent: X (Date: -- ) Interpretation of Tool:  Represents activities that are increasingly more difficult (i.e. Bed mobility, Transfers, Gait). Score 24 23 22-20 19-15 14-10 9-7 6 Modifier CH CI CJ CK CL CM CN   
 
? Mobility - Walking and Moving Around:  
  - CURRENT STATUS: CM - 80%-99% impaired, limited or restricted  - GOAL STATUS: CL - 60%-79% impaired, limited or restricted  - D/C STATUS:  ---------------To be determined--------------- Payor: [employer]ICARE / Plan: [employer] [address] / Product Type: Managed Care Medicare /   
 
Medical Necessity:    
· Patient is expected to demonstrate progress in strength, balance and functional technique to increase independence with mobility and improve safety during mobility. Reason for Services/Other Comments: 
· Patient continues to demonstrate capacity to improve strength, mobility which will increase independence and increase safety. Use of outcome tool(s) and clinical judgement create a POC that gives a: Questionable prediction of patient's progress: MODERATE COMPLEXITY  
  
 
 
 
TREATMENT:  
(In addition to Assessment/Re-Assessment sessions the following treatments were rendered) Pre-treatment Symptoms/Complaints: I am ready to get up Pain: Initial: visual cues Pain Intensity 1: 0  Post Session:   
 
Therapeutic Activity: (  30 Minutes ):  Therapeutic activities including repeated sit<>stand & stand pivot transfer with walker,  
 Date: 
1/13/19 Date: 
1/15 Date: Activity/Exercise Parameters Parameters Parameters Ankle pumps 15 B 15 Quad sets 15 B  15   
glute sets 15 B 15 Hip ABD/ADD 15 B 15 Heel slides 15 B 15 LAQs 15 B 15   
marching 15 B 15 Sit to stand  x5 All exercises are B Braces/Orthotics/Lines/Etc:  
· dukes catheter · ICU lines, O2 via nasal cannula Treatment/Session Assessment:   
· Response to Treatment:  Continue to get fatigue easly · Interdisciplinary Collaboration:  
o Registered Nurse · After treatment position/precautions:  
o Up in chair 
o Bed/Chair-wheels locked 
o Call light within reach 
o RN notified · Compliance with Program/Exercises: Will assess as treatment progresses · Recommendations/Intent for next treatment session: \"Next visit will focus on advancements to more challenging activities and reduction in assistance provided\". Total Treatment Duration: PT Patient Time In/Time Out Time In: 0915 Time Out: 4851 Meeta Pena, PTA

## 2019-01-15 NOTE — PROGRESS NOTES
Bedside shift change report given to Tsering Singh RN (oncoming nurse) by Veronika Weber RN (offgoing nurse). Report included the following information SBAR, Kardex, ED Summary, Intake/Output, MAR, Recent Results and Cardiac Rhythm paced.

## 2019-01-15 NOTE — PROGRESS NOTES
Bedside shift change report given to Karime García (oncoming nurse) by Hiral Ellis RN (offgoing nurse). Report included the following information SBAR, Kardex, ED Summary, Intake/Output, MAR, Recent Results and Cardiac Rhythm a-fib/paced.

## 2019-01-16 LAB
ANION GAP SERPL CALC-SCNC: 5 MMOL/L
BUN SERPL-MCNC: 46 MG/DL (ref 8–23)
CALCIUM SERPL-MCNC: 7.9 MG/DL (ref 8.3–10.4)
CHLORIDE SERPL-SCNC: 105 MMOL/L (ref 98–107)
CO2 SERPL-SCNC: 30 MMOL/L (ref 21–32)
CREAT SERPL-MCNC: 1.25 MG/DL (ref 0.8–1.5)
GLUCOSE SERPL-MCNC: 104 MG/DL (ref 65–100)
INR PPP: 1.5
MM INDURATION POC: 0 MM (ref 0–5)
POTASSIUM SERPL-SCNC: 3.9 MMOL/L (ref 3.5–5.1)
PPD POC: NEGATIVE NEGATIVE
PROTHROMBIN TIME: 18.1 SEC (ref 11.7–14.5)
SODIUM SERPL-SCNC: 140 MMOL/L (ref 136–145)

## 2019-01-16 PROCEDURE — 74011250636 HC RX REV CODE- 250/636: Performed by: INTERNAL MEDICINE

## 2019-01-16 PROCEDURE — 97110 THERAPEUTIC EXERCISES: CPT

## 2019-01-16 PROCEDURE — 80048 BASIC METABOLIC PNL TOTAL CA: CPT

## 2019-01-16 PROCEDURE — 99232 SBSQ HOSP IP/OBS MODERATE 35: CPT | Performed by: INTERNAL MEDICINE

## 2019-01-16 PROCEDURE — 74011250637 HC RX REV CODE- 250/637: Performed by: FAMILY MEDICINE

## 2019-01-16 PROCEDURE — 77030018846 HC SOL IRR STRL H20 ICUM -A

## 2019-01-16 PROCEDURE — 85610 PROTHROMBIN TIME: CPT

## 2019-01-16 PROCEDURE — 94660 CPAP INITIATION&MGMT: CPT

## 2019-01-16 PROCEDURE — 74011000258 HC RX REV CODE- 258: Performed by: INTERNAL MEDICINE

## 2019-01-16 PROCEDURE — 36415 COLL VENOUS BLD VENIPUNCTURE: CPT

## 2019-01-16 PROCEDURE — 74011000250 HC RX REV CODE- 250: Performed by: INTERNAL MEDICINE

## 2019-01-16 PROCEDURE — 74011250636 HC RX REV CODE- 250/636: Performed by: FAMILY MEDICINE

## 2019-01-16 PROCEDURE — 65270000029 HC RM PRIVATE

## 2019-01-16 PROCEDURE — 77010033678 HC OXYGEN DAILY

## 2019-01-16 PROCEDURE — 74011250637 HC RX REV CODE- 250/637: Performed by: INTERNAL MEDICINE

## 2019-01-16 PROCEDURE — 97530 THERAPEUTIC ACTIVITIES: CPT

## 2019-01-16 PROCEDURE — 94760 N-INVAS EAR/PLS OXIMETRY 1: CPT

## 2019-01-16 PROCEDURE — 94640 AIRWAY INHALATION TREATMENT: CPT

## 2019-01-16 RX ORDER — FUROSEMIDE 10 MG/ML
40 INJECTION INTRAMUSCULAR; INTRAVENOUS DAILY
Status: DISCONTINUED | OUTPATIENT
Start: 2019-01-16 | End: 2019-01-20

## 2019-01-16 RX ORDER — WARFARIN SODIUM 5 MG/1
10 TABLET ORAL EVERY EVENING
Status: DISCONTINUED | OUTPATIENT
Start: 2019-01-16 | End: 2019-01-17

## 2019-01-16 RX ADMIN — CARVEDILOL 25 MG: 25 TABLET, FILM COATED ORAL at 17:02

## 2019-01-16 RX ADMIN — ENOXAPARIN SODIUM 40 MG: 40 INJECTION SUBCUTANEOUS at 11:19

## 2019-01-16 RX ADMIN — Medication 10 ML: at 13:07

## 2019-01-16 RX ADMIN — GUAIFENESIN 1200 MG: 600 TABLET, EXTENDED RELEASE ORAL at 08:25

## 2019-01-16 RX ADMIN — AMLODIPINE BESYLATE 10 MG: 10 TABLET ORAL at 08:26

## 2019-01-16 RX ADMIN — ASPIRIN 81 MG: 81 TABLET, COATED ORAL at 08:26

## 2019-01-16 RX ADMIN — GUAIFENESIN 1200 MG: 600 TABLET, EXTENDED RELEASE ORAL at 20:32

## 2019-01-16 RX ADMIN — MORPHINE SULFATE 1 MG: 2 INJECTION, SOLUTION INTRAMUSCULAR; INTRAVENOUS at 20:24

## 2019-01-16 RX ADMIN — FUROSEMIDE 40 MG: 10 INJECTION, SOLUTION INTRAMUSCULAR; INTRAVENOUS at 08:26

## 2019-01-16 RX ADMIN — NYSTATIN: 100000 POWDER TOPICAL at 09:42

## 2019-01-16 RX ADMIN — BISACODYL 10 MG: 5 TABLET, COATED ORAL at 20:32

## 2019-01-16 RX ADMIN — IPRATROPIUM BROMIDE AND ALBUTEROL SULFATE 3 ML: .5; 3 SOLUTION RESPIRATORY (INHALATION) at 11:03

## 2019-01-16 RX ADMIN — LORATADINE 10 MG: 10 TABLET ORAL at 17:03

## 2019-01-16 RX ADMIN — ENOXAPARIN SODIUM 40 MG: 40 INJECTION SUBCUTANEOUS at 20:32

## 2019-01-16 RX ADMIN — CEFTRIAXONE 1 G: 1 INJECTION, POWDER, FOR SOLUTION INTRAMUSCULAR; INTRAVENOUS at 20:25

## 2019-01-16 RX ADMIN — CALCIUM CARBONATE 200 MG: 500 TABLET, CHEWABLE ORAL at 17:01

## 2019-01-16 RX ADMIN — CALCIUM CARBONATE 200 MG: 500 TABLET, CHEWABLE ORAL at 08:26

## 2019-01-16 RX ADMIN — IPRATROPIUM BROMIDE AND ALBUTEROL SULFATE 3 ML: .5; 3 SOLUTION RESPIRATORY (INHALATION) at 19:58

## 2019-01-16 RX ADMIN — CALCIUM CARBONATE 200 MG: 500 TABLET, CHEWABLE ORAL at 11:19

## 2019-01-16 RX ADMIN — IPRATROPIUM BROMIDE AND ALBUTEROL SULFATE 3 ML: .5; 3 SOLUTION RESPIRATORY (INHALATION) at 15:44

## 2019-01-16 RX ADMIN — WARFARIN SODIUM 10 MG: 5 TABLET ORAL at 17:01

## 2019-01-16 RX ADMIN — DOCUSATE SODIUM 400 MG: 100 CAPSULE, LIQUID FILLED ORAL at 20:32

## 2019-01-16 RX ADMIN — LISINOPRIL 20 MG: 20 TABLET ORAL at 08:26

## 2019-01-16 RX ADMIN — Medication 10 ML: at 22:00

## 2019-01-16 RX ADMIN — AZITHROMYCIN MONOHYDRATE 500 MG: 500 INJECTION, POWDER, LYOPHILIZED, FOR SOLUTION INTRAVENOUS at 20:25

## 2019-01-16 RX ADMIN — HYDROCODONE BITARTRATE AND ACETAMINOPHEN 1 TABLET: 10; 325 TABLET ORAL at 17:02

## 2019-01-16 RX ADMIN — IPRATROPIUM BROMIDE AND ALBUTEROL SULFATE 3 ML: .5; 3 SOLUTION RESPIRATORY (INHALATION) at 07:22

## 2019-01-16 RX ADMIN — PANTOPRAZOLE SODIUM 40 MG: 40 TABLET, DELAYED RELEASE ORAL at 08:24

## 2019-01-16 RX ADMIN — CARVEDILOL 25 MG: 25 TABLET, FILM COATED ORAL at 08:25

## 2019-01-16 RX ADMIN — PRAVASTATIN SODIUM 40 MG: 20 TABLET ORAL at 20:31

## 2019-01-16 NOTE — PROGRESS NOTES
Problem: Nutrition Deficit Goal: *Optimize nutritional status Nutrition Assessment for: Length of Stay Assessment:  
Patient presented from PCP with pneumonia and chronic SOB  
PMH- CAD, ischemic cardiomyopathy, Factor V Leiden, CABG, phlebitis, paroxysmal afib. Food and Nutrition History: Patient's appetite has been good. Patient lives alone and cooks for himself. Patient states that he consumes snacks throughout the day that consist of apple pie, peanut butter crackers, and chips. Patient states that he enjoys eggs, hamburgers, spaghetti, and baked fish at home for meals. Anthropometrics: 
Height: 6' 0.01\" (182.9 cm), Weight: 137 kg (302 lb 0.5 oz), Weight Source: Bed, Body mass index is 40.95 kg/m². BMI class of obesity class II for Geriatric. Macronutrient needs: EER: 3800-9724 kcal/day 11-14 g/kg CBW (Current body weight) EPR:  g/day 1.0-1.3g/kg IBW (Ideal body weight) GFR=59 Intake/Comparative Standards: 4 recorded intakes of 100%. Patient stated that he has been consuming 100% of his meals since admission. Daily intake includes: cereal, eggs, turkey sausage, toast, and a yogurt for breakfast, salad with chicken, malathi food cake, baked potato, and pears for lunch, and baked fish with corn, baked potato, and dessert for dinner. According to recorded intakes and patient verbalized intake, patient is estimated to be meeting 100% of his energy and protein needs Nutrition Diagnosis: 
Excessive energy intake related to food-and-nutrition knowledge deficit concerning energy intake as evidenced by BMI of 41 and intake energy dense foods and large portions. Nutrition Intervention: 
Meals and snacks: Continue current diet order Education/Discharge Plan: Educated patient on following the cardiac diet at home. Patient and I spoke about how to make better food and beverage choices at home. Patient receptive of information. Anticipate fair compliance post discharge. Governor Beat Dietetic Intern

## 2019-01-16 NOTE — PROGRESS NOTES
Problem: Falls - Risk of 
Goal: *Absence of Falls Document Tish Sotomayor Fall Risk and appropriate interventions in the flowsheet. Outcome: Progressing Towards Goal 
Fall Risk Interventions: 
Mobility Interventions: Assess mobility with egress test, Bed/chair exit alarm, Communicate number of staff needed for ambulation/transfer, OT consult for ADLs, Patient to call before getting OOB, PT Consult for mobility concerns, PT Consult for assist device competence, Strengthening exercises (ROM-active/passive), Utilize walker, cane, or other assistive device, Utilize gait belt for transfers/ambulation Mentation Interventions: Adequate sleep, hydration, pain control, Bed/chair exit alarm, Eyeglasses and hearing aids, Familiar objects from home, Increase mobility, More frequent rounding, Reorient patient, Room close to nurse's station, Self-releasing belt, Update white board, Toileting rounds Medication Interventions: Bed/chair exit alarm, Evaluate medications/consider consulting pharmacy, Patient to call before getting OOB, Utilize gait belt for transfers/ambulation Elimination Interventions: Bed/chair exit alarm, Call light in reach, Toilet paper/wipes in reach, Toileting schedule/hourly rounds, Elevated toilet seat, Patient to call for help with toileting needs

## 2019-01-16 NOTE — PROGRESS NOTES
Warfarin dosing per pharmacist 
 
Odalys Khan is a 77 y.o. male. @[facility](53)@    [facility] Indication:  atrial fibrillation Goal INR:  2 - 3 Home dose:   5 - 10 mg? Risk factors or significant drug interactions:  macrolide antibiotics Other anticoagulants:  none Daily Monitoring Date  INR     Warfarin dose HGB              Notes 1/16                 1.5                10 mg 
1/15                 1.4                7.5 mg 
1/14                 1.2                7.5 mg                
1/13                 1.2                  5 mg                 
1/12                 1.2                  5 mg                14 
1/11                 1.5                  5 mg                14.5 
1/10  1.6  5 mg  15.2 Will increase Warfarin to 10 mg every evening. Will continue to monitor closely. Thank you, Damián Izaguirre PharmD, BCPS

## 2019-01-16 NOTE — PROGRESS NOTES
Problem: Mobility Impaired (Adult and Pediatric) Goal: *Acute Goals and Plan of Care (Insert Text) STG: 
(1.)Mr. Alexsander Gomez will move from supine to sit and sit to supine  with MINIMAL ASSIST within 3 treatment day(s). (2.)Mr. Alexsander Gomez will transfer from bed to chair and chair to bed with MODERATE ASSIST using the least restrictive device within 3 treatment day(s). Met 1/14 LTG: 
(1.)Mr. Alexsander Gomez will move from supine to sit and sit to supine  in bed with STAND BY ASSIST within 5-7 treatment day(s). (2.)Mr. Alexsander Gomez will transfer from bed to chair and chair to bed with CONTACT GUARD ASSIST using the least restrictive device within 5-7 treatment day(s). (3.)Mr. Alexsander Gomez will ambulate with MIN TO MODERATE ASSIST for 15 feet with the least restrictive device within 5-7  treatment day(s). ________________________________________________________________________________________________ PHYSICAL THERAPY: Daily Note, Treatment Day: 4th, PM 1/16/2019INPATIENT: Hospital Day: 7 Payor: [facility] / Plan: [address] / Product Type: [employer] Care Medicare /  
  
NAME/AGE/GENDER: Arian Kulkarni is a 77 y.o. male PRIMARY DIAGNOSIS: PNA (pneumonia) [J18.9] Hypoxia [R09.02] Hyperkalemia [E87.5] Respiratory failure with hypercapnia (HCC) [J96.92] PNA (pneumonia) PNA (pneumonia) ICD-10: Treatment Diagnosis:  
 · Generalized Muscle Weakness (M62.81) · Other abnormalities of gait and mobility (R26.89) Precaution/Allergies: 
Patient has no known allergies. ASSESSMENT:  
Mr. Alexsander Gomez is sitting in the chair upon arrival.  He performs exercises without any problems. This section established at most recent assessment PROBLEM LIST (Impairments causing functional limitations): 1. Decreased Strength 2. Decreased ADL/Functional Activities 3. Decreased Transfer Abilities 4. Decreased Ambulation Ability/Technique 5. Decreased Balance 6. Decreased Activity Tolerance 7. Increased Shortness of Breath 8. Decreased [place] with Home Exercise Program 
 INTERVENTIONS PLANNED: (Benefits and precautions of physical therapy have been discussed with the patient.) 1. Balance Exercise 2. Bed Mobility 3. Gait Training 4. Home Exercise Program (HEP) 5. Therapeutic Activites 6. Therapeutic Exercise/Strengthening 7. Transfer Training TREATMENT PLAN: Frequency/Duration: daily for duration of hospital stay Rehabilitation Potential For Stated Goals: Fair RECOMMENDED REHABILITATION/EQUIPMENT: (at time of discharge pending progress): Due to the probability of continued deficits (see above) this patient will likely need continued skilled physical therapy after discharge. Equipment:  
? Walkers, Type: Rolling Walker;   
    
 
 
 
HISTORY:  
History of Present Injury/Illness (Reason for Referral): 
Patient states he has a history of falls; sometimes he can walk and sometimes he can't. Patient is not a good historian. Family not present during evaluation. PER MD NOTES: 
 \"77year-old male history of coronary artery disease, ischemic cardipmyopathy, hypertension, factor V Leiden, CABG, AICD for nonsustained V. tach, phlebitis, DVT, paroxysmal atrial fibrillation, aortic stenosis presents from his primary care office for pneumonia. Pt does have chronic sob on exertion since cabg- several years ago. Since past 2 weeks- nasal congestion,increased sob-- no fever or headache or dizziness or chest pain. Went to pcp today for his INR check- daughter complained that pt was looking ill- wanted pcp to evaluate- found to be hypoxic - oxygen sat 83%- was given breathing treatment- was told that has pneumonia- sent to er for further evaluation. In er found to have oxygen saturation 89- cxr- cardiomegaly with infiltrate- started in rocephin and zithromax.  
Mild elevated d-dimer- ordered ct chest with contrast - pending. Pt will be admitted for acute on chronic sob-secondary to pneumonia. \" 
 
 
Past Medical History/Comorbidities:  
Mr. Kaycee Caldwell  has a past medical history of AICD (automatic cardioverter/defibrillator) present (10/27/2015), Aortic stenosis, mild to moderate (10/27/2015), Cardiomyopathy, ischemic (8/17/2012), Carotid occlusion, bilateral (8/20/2012), Chronic back pain (2/12/2015), Chronic rhinitis (16/48/0990), Chronic systolic congestive heart failure ([address] (10/27/2015), Coronary artery disease (8/17/2012), DVT, lower extremity, recurrent ([address] Factor 5 Leiden mutation, heterozygous ([address] (8/17/2012), Heart attack ([address] History of alcoholism ([facility] (quit age 34), History of complete eye exam (10/2016), smoking (quit after 19), Hyperlipidemia (8/17/2012), Hypertension (8/17/2012), NSVT (nonsustained ventricular tachycardia) ([address] Obesity (8/17/2012), Osteoarthritis (2/12/2015), Pacemaker, Paroxysmal atrial fibrillation ([address] (10/27/2015), Phlebitis and thrombophlebitis (1995, 2004), S/P total knee arthroplasty (2/26/2015), Tobacco abuse (2/12/2015), and Wears dentures. Mr. Kaycee Caldwell  has a past surgical history that includes hx cataract removal; hx lipoma resection (1998); hx angioplasty (1999); hx implantable cardioverter defibrillator (12/18/2011); hx coronary artery bypass graft (x 3, 12/2011); hx orthopaedic (Left); and hx lymph node dissection. Social History/Living Environment:  
Home Environment: Apartment # Steps to Enter: 14 
Rails to Enter: Yes One/Two Story Residence: Two story(lives on second floor) Living Alone: Yes Support Systems: Friends \ neighbors Patient Expects to be Discharged to[de-identified] Rehabilitation facility Current DME Used/Available at Home: [employer], bedsidehas a daughter; unclear about living situation. Prior Level of Function/Work/Activity: 
Patient says he was able to ambulate but has history of falls. Dominant Side:  
      RIGHT Personal Factors:   
      Sex:  male Age:  77 y.o. Number of Personal Factors/Comorbidities that affect the Plan of Care: 1-2: MODERATE COMPLEXITY EXAMINATION:  
Most Recent Physical Functioning:  
Gross Assessment: 3-/5 throughout Balance: 
  Bed Mobility: 
Rolling: Contact guard assistance Supine to Sit: Minimum assistance Sit to Supine: (left in chair) Transfers: 
Sit to Stand: Minimum assistance Stand to Sit: Minimum assistance Bed to Chair: Minimum assistance Duration: 0 Minutes Gait:Patient unable to ambulate at this time. Speed/Blanca: Shuffled; Slow Step Length: Left shortened;Right shortened Gait Abnormalities: Decreased step clearance Distance (ft): 20 Feet (ft)(breakfast is here) Assistive Device: Walker, rolling Ambulation - Level of Assistance: Minimal assistance Body Structures Involved: 1. Nerves 2. Heart 3. Lungs 4. Joints 5. Muscles Body Functions Affected: 1. Sensory/Pain 2. Neuromusculoskeletal 
3. Movement Related Activities and Participation Affected: 1. General Tasks and Demands 2. Mobility 3. Self Care 4. Domestic Life 5. Community, Social and [place] Number of elements that affect the Plan of Care: 3: MODERATE COMPLEXITY CLINICAL PRESENTATION:  
Presentation: Evolving clinical presentation with changing clinical characteristics: MODERATE COMPLEXITY CLINICAL DECISION MAKING:  
[facility] -PAC 6 Clicks Basic Mobility Inpatient Short Form How much difficulty does the patient currently have. .. Unable A Lot A Little None 1. Turning over in bed (including adjusting bedclothes, sheets and blankets)? [] 1   [x] 2   [] 3   [] 4  
2. Sitting down on and standing up from a chair with arms ( e.g., wheelchair, bedside commode, etc.)   [] 1   [x] 2   [] 3   [] 4  
3. Moving from lying on back to sitting on the side of the bed? [] 1   [x] 2   [] 3   [] 4 How much help from another person does the patient currently need. .. Total A Lot A Little None 4. Moving to and from a bed to a chair (including a wheelchair)? [x] 1   [] 2   [] 3   [] 4  
5. Need to walk in hospital room? [x] 1   [] 2   [] 3   [] 4  
6. Climbing 3-5 steps with a railing? [x] 1   [] 2   [] 3   [] 4  
© 2007, Trustees of [facility], under license to [employer]. All rights reserved Score:  Initial: 9 Most Recent: X (Date: -- ) Interpretation of Tool:  Represents activities that are increasingly more difficult (i.e. Bed mobility, Transfers, Gait). Score 24 23 22-20 19-15 14-10 9-7 6 Modifier CH CI CJ CK CL CM CN   
 
? Mobility - Walking and Moving Around:  
  - CURRENT STATUS: CM - 80%-99% impaired, limited or restricted  - GOAL STATUS: CL - 60%-79% impaired, limited or restricted  - D/C STATUS:  ---------------To be determined--------------- Payor: [employer] MEDICARE / Plan: [address] / Product Type: Managed Care Medicare /   
 
Medical Necessity:    
· Patient is expected to demonstrate progress in strength, balance and functional technique to increase independence with mobility and improve safety during mobility. Reason for Services/Other Comments: 
· Patient continues to demonstrate capacity to improve strength, mobility which will increase independence and increase safety. Use of outcome tool(s) and clinical judgement create a POC that gives a: Questionable prediction of patient's progress: MODERATE COMPLEXITY  
  
 
 
 
TREATMENT:  
(In addition to Assessment/Re-Assessment sessions the following treatments were rendered) Pre-treatment Symptoms/Complaints: I want to eat breakfast in the chair Pain: Initial: visual cues Pain Intensity 1: 0  Post Session:   
 
Therapeutic Activity: (  0 Minutes ):  Therapeutic activities including repeated sit<>stand & stand pivot transfer with walker, Therapeutic Exercise: (15 Minutes):  Exercises per grid below to improve strength. Required minimal visual cues to promote proper body alignment. Progressed range as indicated. Date: 
1/13/19 Date: 
1/15 Date: 
1/16 Activity/Exercise Parameters Parameters Parameters Ankle pumps 15 B 15  15 Quad sets 15 B  15 15  
glute sets 15 B 15 15 Hip ABD/ADD 15 B 15 15 Heel slides 15 B 15 15 LAQs 15 B 15 15  
marching 15 B 15 15 Sit to stand  x5 All exercises are B Braces/Orthotics/Lines/Etc:  
· dukes catheter · ICU lines, O2 via nasal cannula Treatment/Session Assessment:   
· Response to Treatment:  Continue to make progress · Interdisciplinary Collaboration:  
o Registered Nurse · After treatment position/precautions:  
o Up in chair 
o Bed/Chair-wheels locked 
o Call light within reach 
o RN notified · Compliance with Program/Exercises: Will assess as treatment progresses · Recommendations/Intent for next treatment session: \"Next visit will focus on advancements to more challenging activities and reduction in assistance provided\". Total Treatment Duration: PT Patient Time In/Time Out Time In: 4124 Time Out: 1430 Meeta Pena, PTA

## 2019-01-16 NOTE — PROGRESS NOTES
TRANSFER - IN REPORT: 
 
Verbal report received from Nolberto RN(name) on Dimas Clement  being received from ICU(unit) for routine progression of care Report consisted of patients Situation, Background, Assessment and  
Recommendations(SBAR). Information from the following report(s) SBAR, Kardex, Intake/Output, MAR and Recent Results was reviewed with the receiving nurse. Opportunity for questions and clarification was provided. Assessment completed upon patients arrival to unit and care assumed.

## 2019-01-16 NOTE — PROGRESS NOTES
Critical Care Daily Progress Note: 1/16/2019 Chantel Randall Admission Date: 1/10/2019 The patient's chart is reviewed and the patient is discussed with the staff. 
 
 
L[age] Quinones y.o.  male  evaluated at the request of Dr. Denver Gower. Pt has a history of coronary artery disease, ischemic cardipmyopathy, hypertension, factor V Leiden, CABG, AICD for nonsustained V. tach, phlebitis, DVT, paroxysmal atrial fibrillation, aortic stenosis presents from his primary care office for pneumonia.  
Pt does have chronic sob on exertion since cabg- several years ago. Since past 2 weeks- nasal congestion,increased sob-- no fever or headache or dizziness or chest pain. Went to pcp1/10 for his INR check- daughter complained that pt was looking ill- wanted pcp to evaluate- found to be hypoxic - oxygen sat 83%- was given breathing treatment- was told that has pneumonia- sent to er for further evaluation. Subjective: Wore BIPAP overnight for only a couple hours. On 2LNC now. Sat in chair and walked around room with PT. Was out of bed for most of yesterday. Feels breathing is at baseline. Was happy to get out of bed yesterday and ready to go again. Current Facility-Administered Medications Medication Dose Route Frequency  predniSONE (DELTASONE) tablet 20 mg  20 mg Oral DAILY WITH BREAKFAST  albuterol-ipratropium (DUO-NEB) 2.5 MG-0.5 MG/3 ML  3 mL Nebulization QID RT  
 enoxaparin (LOVENOX) injection 40 mg  40 mg SubCUTAneous Q12H  warfarin (COUMADIN) tablet 7.5 mg  7.5 mg Oral QPM  
 pantoprazole (PROTONIX) tablet 40 mg  40 mg Oral ACB  calcium carbonate (TUMS) chewable tablet 200 mg [elemental]  200 mg Oral TID WITH MEALS  
 azithromycin (ZITHROMAX) 500 mg in 0.9% sodium chloride (MBP/ADV) 250 mL  500 mg IntraVENous Q24H  cefTRIAXone (ROCEPHIN) 1 g in 0.9% sodium chloride (MBP/ADV) 50 mL  1 g IntraVENous Q24H  nystatin (MYCOSTATIN) 100,000 unit/gram powder   Topical BID  influenza vaccine 2018-19 (6 mos+)(PF) (FLUARIX QUAD/FLULAVAL QUAD) injection 0.5 mL  0.5 mL IntraMUSCular PRIOR TO DISCHARGE  sodium chloride (NS) flush 10 mL  10 mL InterCATHeter Q8H  
 bisacodyl (DULCOLAX) tablet 10 mg  10 mg Oral QHS  docusate sodium (COLACE) capsule 400 mg  400 mg Oral QHS  amLODIPine (NORVASC) tablet 10 mg  10 mg Oral DAILY  aspirin delayed-release tablet 81 mg  81 mg Oral DAILY  carvedilol (COREG) tablet 25 mg  25 mg Oral BID WITH MEALS  guaiFENesin ER (MUCINEX) tablet 1,200 mg  1,200 mg Oral BID  
 HYDROcodone-acetaminophen (NORCO)  mg tablet 1 Tab  1 Tab Oral Q6H PRN  
 lisinopril (PRINIVIL, ZESTRIL) tablet 20 mg  20 mg Oral DAILY  pravastatin (PRAVACHOL) tablet 40 mg  40 mg Oral QHS  sodium chloride (NS) flush 5-40 mL  5-40 mL IntraVENous PRN  
 acetaminophen (TYLENOL) tablet 650 mg  650 mg Oral Q4H PRN  
 morphine injection 1 mg  1 mg IntraVENous Q4H PRN  
 naloxone (NARCAN) injection 0.4 mg  0.4 mg IntraVENous PRN  
 ondansetron (ZOFRAN) injection 4 mg  4 mg IntraVENous Q4H PRN  
 hydrALAZINE (APRESOLINE) 20 mg/mL injection 10 mg  10 mg IntraVENous Q6H PRN  
 loratadine (CLARITIN) tablet 10 mg  10 mg Oral DAILY Review of Systems Constitutional:  negative for fever, chills, sweats Cardiovascular:  negative for chest pain, palpitations, syncope: + edema Gastrointestinal:  negative for dysphagia, reflux, vomiting, diarrhea, abdominal pain, or melena Neurologic:  negative for focal weakness, numbness, headache Objective:  
 
Vitals:  
 01/16/19 0727 01/16/19 1071 01/16/19 9369 01/16/19 4775 BP: 123/63 129/62  136/64 Pulse: 73 75  74 Resp: (!) 36 (!) 33  28 Temp:      
SpO2: 93% 93% 94% 93% Weight:      
Height:      
 
Intake and Output:  
01/14 1901 - 01/16 0700 In: 1200 [P.O.:600; I.V.:600] [facility]: 8129 [facility]] No intake/output data recorded. Physical Exam:         
Constitutional:  the patient is obese and in no acute distress on 2L  
EENMT:  Sclera clear, pupils equal, oral mucosa moist 
Respiratory: BS decreased but clear Cardiovascular:  RRR without M,G,R 
Gastrointestinal: soft and non-tender; with positive bowel sounds. Musculoskeletal: warm without cyanosis. There is 2+ lower leg edema. Skin:  no jaundice or rashes, no wounds Neurologic: no gross neuro deficits Psychiatric:  alert and oriented x 3 LINES: 
peripheral 
 
DRIPS: 
none CXR:  1/12: low lung volumes, poor inspiratory effort LAB No results for input(s): GLUCPOC in the last 72 hours. No lab exists for component: [place] Recent Labs  
  01/16/19 
0320 01/15/19 
0890 01/14/19 
9587 WBC  --  10.2  --   
HGB  --  13.6  --   
HCT  --  45.1  --   
PLT  --  150  --   
INR 1.5 1.4 1.2 Recent Labs  
  01/16/19 
0320 01/15/19 
3285 01/14/19 
1189  139 138  
K 3.9 4.2 4.2  103 101 CO2 30 32 30 * 127* 153* BUN 46* 47* 40* CREA 1.25 1.31 1.34  
CA 7.9* 8.0* 8.1* Recent Labs  
  01/15/19 [place] PHI 7.311* PCO2I 49.5* PO2I 67* HCO3I 25.0 No results for input(s): LCAD, LAC in the last 72 hours. No results for input(s): PH, PCO2, PO2, HCO3 in the last 72 hours. Assessment:  (Medical Decision Making) Hospital Problems  Date Reviewed: 10/2/2018 Codes Class Noted POA  
 COPD exacerbation ([facility] [address] ICD-10-CM: J44.1 ICD-9-CM: 491.21  1/11/2019 Unknown Better -no wheezing. Can decrease steroids. Had restrictive pattern on CPFTs in 2015. Hyperkalemia ICD-10-CM: E87.5 ICD-9-CM: 276.7  1/10/2019 Unknown Resolved Hypoxia ICD-10-CM: R09.02 
ICD-9-CM: 799.02  1/10/2019 Unknown On nc.   
 * (Principal) PNA (pneumonia) ICD-10-CM: J18.9 ICD-9-CM: 800  1/10/2019 Unknown Respiratory failure with hypercapnia ([facility] [address] ICD-10-CM: J96.92 
ICD-9-CM: 518.81  1/10/2019 Unknown Improved. Need to recheck ABG after pt is OOB. Essential hypertension ICD-10-CM: I10 
ICD-9-CM: 401.9  2/6/2017 Yes Chronic systolic congestive heart failure (HCC) ICD-10-CM: I50.22 ICD-9-CM: 428.22, 428.0  10/27/2015 Yes AICD (automatic cardioverter/defibrillator) present ICD-10-CM: Z95.810 ICD-9-CM: V45.02  10/27/2015 Yes Paroxysmal atrial fibrillation (HCC) ICD-10-CM: I48.0 ICD-9-CM: 427.31  10/27/2015 Yes Aortic stenosis, mild to moderate ICD-10-CM: I35.0 ICD-9-CM: 424.1  10/27/2015 Yes Chronic back pain (Chronic) ICD-10-CM: M54.9, G89.29 ICD-9-CM: 724.5, 338.29  2/12/2015 Yes Obesity (Chronic) ICD-10-CM: Z54.7 ICD-9-CM: 278.00  8/17/2012 Yes Ongoing. Coronary artery disease (Chronic) ICD-10-CM: I25.10 ICD-9-CM: 414.00  8/17/2012 Yes Overview Signed 8/17/2012  3:57 PM by Stephen Villa Diagnosed 1999, CABG 12/2011 Cardiomyopathy, ischemic (Chronic) ICD-10-CM: I25.5 ICD-9-CM: 414.8  8/17/2012 Yes Overview Signed 8/17/2012  3:58 PM by Stephen Villa Pacemaker AICD 12/2011,  35% LUEF Factor 5 Leiden mutation, heterozygous ([facility] [address] (Chronic) ICD-10-CM: D15.67 
ICD-9-CM: 289.81  8/17/2012 Yes Overview Signed 8/17/2012  4:02 PM by Stephen Villa Chronic anticoag., hx DVT Hyperlipidemia (Chronic) ICD-10-CM: D80.2 ICD-9-CM: 272.4  8/17/2012 Yes Probable JACOB Obesity hypoventilation- patient almost assuredly has JACOB. May have OHS, but ABG yesterday was probably not adequate for NIV requirement. Plan:  (Medical Decision Making) --continue PT, Mobility, OOB to chair 
--Trial APAP tonight 8-20 
--ABG in AM 
--Lasix 40mg IV daily (-2.4L yesterday, tolerated well) --Complete Abx today --Stop Prednisone, continue nebs --Bedside spirometry only reports FEV1 of 42%, will need further testing when able 
--Okay to leave ICU from pulmonary perspective More than 50% of the time documented was spent in face-to-face contact with the patient and in the care of the patient on the floor/unit where the patient is located.  
 
John Del Angel MD

## 2019-01-16 NOTE — PROGRESS NOTES
TRANSFER - OUT REPORT: 
 
Verbal report given to Aly Marsh RN on Rory Valadez  being transferred to [facility] 18  for routine progression of care Report consisted of patients Situation, Background, Assessment and  
Recommendations(SBAR). Information from the following report(s) SBAR, Kardex, ED Summary, Intake/Output, MAR, Accordion, Recent Results, Med Rec Status, Cardiac Rhythm Paced  and Quality Measures was reviewed with the receiving nurse. Lines:    
 
Opportunity for questions and clarification was provided. Patient transported with: 
 Monitor O2 @ 2 liters Registered Nurse Air tap and walker delivered with patient, and patient belongings. Posey alarm set with patient in chair and call light within reach. Patient verbalized that he was comfortable and did not need anything more at this time. Cell phone given to patient. Patient tolerated transport w/o complication, cardiac monitor functioning appropriately.

## 2019-01-16 NOTE — PROGRESS NOTES
Bedside, Verbal and Written shift change report given to Bib Rubio RN by Cathy Mcbride RN. Report included the following information SBAR, Kardex, ED Summary, Intake/Output, MAR, Accordion, Recent Results, Med Rec Status, Cardiac Rhythm Paced, Alarm Parameters  and Quality Measures.

## 2019-01-16 NOTE — ROUTINE PROCESS
TRANSFER - IN REPORT: 
 
Verbal report received from ICU (name) on Jude Fernandez  being received from *ICU unit) for routine progression of care Report consisted of patients Situation, Background, Assessment and  
Recommendations(SBAR). Information from the following report(s) SBAR, Kardex, Intake/Output and MAR was reviewed with the receiving nurse. Opportunity for questions and clarification was provided. Assessment completed upon patients arrival to unit and care assumed.

## 2019-01-16 NOTE — PROGRESS NOTES
Bedside shift change report given to [facility] (oncoming nurse) by Lissette Cerda (offgoing nurse). Report included the following information Kardex, Intake/Output, MAR, Recent Results, Med Rec Status and Cardiac Rhythm Paced. Alert and oriented on 2 LNC. Lying in bed watching TV. Right arm single lumen PICC, capped. No blood return present. [place] patent. Turns self and ambulates in room with walker. No DTIs/No pressure ulcers observed. Cell phone and dentures with patient.

## 2019-01-16 NOTE — PROGRESS NOTES
Mr. Gigi Cali partially bathed and partially shaved hisself. Assistance to complete bath and shave given.  stands from [facility] with minimal assistance using rolling walker and ambulates slowly to bed and lowers himself into a sitting position. Unable to maintain a sitting position while on side of bed. (requires back support) Needs assistance to lift legs into bed. Dyspnea with activity/exertion.

## 2019-01-16 NOTE — PROGRESS NOTES
Mr. Buzz Ovalle states,\"I've had enough of this mask. I want to take this off. \" 
BIPAP removed and 2 LNC placed.  states,\"I don't like to wear that mask very long and that's long enough. \" 
BIPAP on approximately 2 hours.

## 2019-01-16 NOTE — PROGRESS NOTES
Transferred from ICU. Alert and oriented x 3 with some confusion. Verbalizes need.  Up with 1P assist.

## 2019-01-16 NOTE — PROGRESS NOTES
Problem: Self Care Deficits Care Plan (Adult) Goal: *Acute Goals and Plan of Care (Insert Text) 1. Patient will perform grooming with stand by assistance seated edge of bed. 2. Patient will perform Upper body dressing with minimal assistance seated edge of bed. 3. Patient will perform upper body bathing with minimal assistance seated edge of bed. 4. Patient will participate in 30 + minutes of ADL/ therapeutic exercise/therapeutic activity with min rest breaks to increase activity tolerance for self care. PROGRESSING Goals to be achieved in 7 days. Mobility goals to be set as mobility is further assessed. OCCUPATIONAL THERAPY: Daily Note, Treatment Day: 1st and PM 1/16/2019INPATIENT: Hospital Day: 7 Payor: Debby Ahmadi / Plan: [address] / Product Type: [employer] Care Medicare /  
  
NAME/AGE/GENDER: Wing Hunt is a 77 y.o. male PRIMARY DIAGNOSIS:  PNA (pneumonia) [J18.9] Hypoxia [R09.02] Hyperkalemia [E87.5] Respiratory failure with hypercapnia (HCC) [J96.92] PNA (pneumonia) PNA (pneumonia) ICD-10: Treatment Diagnosis:  
 · Generalized Muscle Weakness (M62.81) Precautions/Allergies: 
   Patient has no known allergies. ASSESSMENT:  
Mr. Kristine Shepard presents with above diagnosis and was noted to have significant generalized weakness. Pt noted he fell getting in/out tub shower combo 6 months ago and suffered a wound to his leg that required home health care and the wound center to get it healed. In that time his became sedentary and with decreased ability to move and do for himself. There was a woman in the room that stated she was his daughter and lived in the apartment below him. .. Unsure of this authenticity of this relationship. She does seem to care for him and says she is willing to help. He will, however, need short term rehab post discharge due to significant debility.  Pt will benefit from OT while in the hospital to start the process of increased independence and safety with basic self care. 1/16/19 1305 patient sitting up in recliner most of the day and worked with PT. He declined bathing off but agreed to B UE therapeutic exercise. He was living alone prior to admit and was mod I having home health nursing addressing his Left leg wound. He plans for further therapy at a rehab then to be discharged to his \"daughters home\" that is accessible. He is agreeable and motivated. He is moving out of ICU to the floor per nursing. Will continue with OT poc. This section established at most recent assessment PROBLEM LIST (Impairments causing functional limitations): 1. Decreased Strength 2. Decreased ADL/Functional Activities 3. Decreased Transfer Abilities 4. Decreased Ambulation Ability/Technique 5. Decreased Balance 6. Decreased Activity Tolerance INTERVENTIONS PLANNED: (Benefits and precautions of occupational therapy have been discussed with the patient.) 1. Activities of daily living training 2. Adaptive equipment training 3. Balance training 4. Therapeutic activity 5. Therapeutic exercise TREATMENT PLAN: Frequency/Duration: Follow patient 3 times per week to address above goals. Rehabilitation Potential For Stated Goals: Good RECOMMENDED REHABILITATION/EQUIPMENT: (at time of discharge pending progress): Due to the probability of continued deficits (see above) this patient will likely need continued skilled occupational therapy after discharge. Equipment:  
? None at this time OCCUPATIONAL PROFILE AND HISTORY:  
History of Present Injury/Illness (Reason for Referral): 
weakness Past Medical History/Comorbidities:  
Mr. Alexsander Gomez  has a past medical history of AICD (automatic cardioverter/defibrillator) present (10/27/2015), Aortic stenosis, mild to moderate (10/27/2015), Cardiomyopathy, ischemic (8/17/2012), Carotid occlusion, bilateral (8/20/2012), Chronic back pain (2/12/2015), Chronic rhinitis (27/15/2446), Chronic systolic [facility] (10/27/2015), Coronary artery disease (8/17/2012), DVT, lower extremity, recurrent ([facility] Factor 5 Leiden mutation, heterozygous ([facility] (8/17/2012), Heart attack ([facility] History of alcoholism ([facility] (quit age 34), History of complete eye exam (10/2016), smoking (quit after 19), Hyperlipidemia (8/17/2012), Hypertension (8/17/2012), NSVT (nonsustained ventricular tachycardia) ([facility] Obesity (8/17/2012), Osteoarthritis (2/12/2015), Pacemaker, Paroxysmal atrial fibrillation ([facility] (10/27/2015), Phlebitis and thrombophlebitis (1995, 2004), S/P total knee arthroplasty (2/26/2015), Tobacco abuse (2/12/2015), and Wears dentures. Mr. Alejandrina Strickland  has a past surgical history that includes hx cataract removal; hx lipoma resection (1998); hx angioplasty (1999); hx implantable cardioverter defibrillator (12/18/2011); hx coronary artery bypass graft (x 3, 12/2011); hx orthopaedic (Left); and hx lymph node dissection. Social History/Living Environment:  
Home Environment: Apartment # Steps to Enter: 14 
Rails to Enter: Yes One/Two Story Residence: Two story(lives on second floor) Living Alone: Yes Support Systems: Friends \ neighbors Patient Expects to be Discharged to[de-identified] Rehabilitation facility Current DME Used/Available at Home: Commode, bedside Prior Level of Function/Work/Activity: 
Independent prior to a fall 6 month ago. Number of Personal Factors/Comorbidities that affect the Plan of Care: Expanded review of therapy/medical records (1-2):  MODERATE COMPLEXITY ASSESSMENT OF OCCUPATIONAL PERFORMANCE[de-identified]  
Activities of Daily Living:  
Basic ADLs (From Assessment) Complex ADLs (From Assessment) Feeding: Independent Oral Facial Hygiene/Grooming: Minimum assistance Bathing: Maximum assistance Upper Body Dressing: Moderate assistance Lower Body Dressing: Total assistance Toileting: Total assistance(bed pan, urinal) Grooming/Bathing/Dressing Activities of Daily Living Cognitive Retraining Safety/Judgement: Awareness of environment; Fall prevention Bed/Mat Mobility Rolling: Contact guard assistance Supine to Sit: Minimum assistance Sit to Supine: (left in chair) Sit to Stand: Minimum assistance Bed to Chair: Minimum assistance Most Recent Physical Functioning:  
Gross Assessment: 
  
         
  
Posture: 
Posture (WDL): Exceptions to [facility] Posture Assessment: Forward head, Rounded shoulders Balance: 
  Bed Mobility: 
Rolling: Contact guard assistance Supine to Sit: Minimum assistance Sit to Supine: (left in chair) Wheelchair Mobility: 
  
Transfers: 
Sit to Stand: Minimum assistance Stand to Sit: Minimum assistance Bed to Chair: Minimum assistance Duration: 0 Minutes Patient Vitals for the past 6 hrs: 
 BP BP Patient Position SpO2 O2 Flow Rate (L/min) Pulse 01/16/19 1004 141/68  93 %  83  
01/16/19 1034 113/50  93 %  73  
01/16/19 1103   94 % 2 l/min   
01/16/19 1104 101/56  94 %  76  
01/16/19 1134 96/51 At rest 93 %  75  
01/16/19 1144 106/56  93 %  70  
01/16/19 1155 103/56  93 %  76  
01/16/19 1210 123/67  94 %  79  
01/16/19 1225 108/66  93 %  74  
01/16/19 1240 (!) 83/60  93 %  79  
01/16/19 1255 101/50  93 %  74  
01/16/19 1310 (!) 82/46  93 %  66  
01/16/19 1325 114/62  94 %  75  
01/16/19 1340 92/55  93 %  75  
01/16/19 1357 95/52  94 %  70  
01/16/19 1410 110/64  90 %  75  
01/16/19 1425 110/56  93 %  75  
01/16/19 1446 90/49  96 %  69  
01/16/19 1455 99/75  93 %  74  
01/16/19 1510 109/54 At rest 93 %  78 Mental Status Neurologic State: Alert, Appropriate for age Orientation Level: Appropriate for age Cognition: Appropriate decision making, Appropriate for age attention/concentration, Appropriate safety awareness, Follows commands Perception: Appears intact Perseveration: No perseveration noted Safety/Judgement: Awareness of environment, Fall prevention Physical Skills Involved: 
1. Balance 2. Strength 3. Activity Tolerance Cognitive Skills Affected (resulting in the inability to perform in a timely and safe manner): 1. maikel Psychosocial Skills Affected: 1. Environmental Adaptation Number of elements that affect the Plan of Care: 3-5:  MODERATE COMPLEXITY CLINICAL DECISION MAKING:  
[facility] AM-PAC 6 Clicks Daily Activity Inpatient Short Form How much help from another person does the patient currently need. .. Total A Lot A Little None 1. Putting on and taking off regular lower body clothing? [x] 1   [] 2   [] 3   [] 4  
2. Bathing (including washing, rinsing, drying)? [] 1   [x] 2   [] 3   [] 4  
3. Toileting, which includes using toilet, bedpan or urinal?   [] 1   [x] 2   [] 3   [] 4  
4. Putting on and taking off regular upper body clothing? [] 1   [x] 2   [] 3   [] 4  
5. Taking care of personal grooming such as brushing teeth? [] 1   [] 2   [x] 3   [] 4  
6. Eating meals? [] 1   [] 2   [] 3   [x] 4  
© 2007, Trustees of [facility], under license to [employer]. All rights reserved Score:  Initial: 14 Most Recent: X (Date: -- ) Interpretation of Tool:  Represents activities that are increasingly more difficult (i.e. Bed mobility, Transfers, Gait). Score 24 23 22-20 19-15 14-10 9-7 6 Modifier CH CI CJ CK CL CM CN   
 
? Self Care:  
  - CURRENT STATUS: CL - 60%-79% impaired, limited or restricted  - GOAL STATUS: CK - 40%-59% impaired, limited or restricted  - D/C STATUS:  ---------------To be determined--------------- Payor: UNITED [employer] MEDICARE / Plan: Rafy Mckinney / Product Type: [employer] Care Medicare /   
 
Medical Necessity:    
· Patient is expected to demonstrate progress in strength, balance, coordination and functional technique to increase independence with self care. Reason for Services/Other Comments: 
· Patient would benefit from OT to maximize safety and independence with self care and functional mobility . Use of outcome tool(s) and clinical judgement create a POC that gives a: MODERATE COMPLEXITY  
 
 
 
TREATMENT:  
(In addition to Assessment/Re-Assessment sessions the following treatments were rendered) Pre-treatment Symptoms/Complaints:   
Pain: Initial:  
Pain Intensity 1: 0 0 Post Session:  0 Therapeutic Exercise: (15 Minutes):  Exercises per grid below to improve strength, coordination and dynamic movement of  B UE to increase activity for ADL task and transfers. Patient performed UE ex per grid below. Date: 
1/16/19 Date: 
 Date: Activity/Exercise Parameters Parameters Parameters Single UE shoulder flexion R then L 10 reps B forward punches  15 reps Gross grasps flex/ext 15 reps Shoulder shruggs 15 reps Braces/Orthotics/Lines/Etc:  
· IV 
· ICU monitors · O2 Device: Hi flow nasal cannula, Humidifier Treatment/Session Assessment:   
· Response to Treatment:  Pt with good understanding of why he is in hospital and the therapy required to get back to his baseline motivated to get stronger to move into his daughters home in February 2019 · Interdisciplinary Collaboration:  
o Occupational Therapist 
o Registered Nurse · After treatment position/precautions:  
o Up in chair 
o Bed alarm/tab alert on 
o Bed/Chair-wheels locked 
o Call light within reach 
o RN notified · Compliance with Program/Exercises: Compliant all of the time, Will assess as treatment progresses. · Recommendations/Intent for next treatment session: \"Next visit will focus on reduction in assistance provided\". Total Treatment Duration:15 
OT Patient Time In/Time Out Time In: 5051 Time Out: 1520 Ricki Garibay OT

## 2019-01-16 NOTE — PROGRESS NOTES
Pt back on NC 2lpm. NAD. RN stated he was \"Done\" with the BIPAP for the rest of the night per the pt.

## 2019-01-16 NOTE — PROGRESS NOTES
Bedside shift change report given to [address] (oncoming nurse) by [facility] (offgoing nurse). Report included the following information Kardex, Intake/Output, MAR, Recent Results, Med Rec Status and Cardiac Rhythm Paced. Alert and oriented x4 on 2 LNC while sitting up in recliner watching TV. Friend at bedside. Right Upper arm single lumen picc, capped. Mckeon patent. Bilateral buttocks red/purple blanchable. No pressure ulcers observed. Turns self and shifts weight while in chair. Able to stand from sitting position by self.

## 2019-01-16 NOTE — INTERDISCIPLINARY ROUNDS
Interdisciplinary team rounds were held 1/16/2019 with the following team members:Care Management, Nursing, Physician and Clinical Coordinator. Plan of care discussed. See clinical pathway and/or care plan for interventions and desired outcomes.

## 2019-01-16 NOTE — PROGRESS NOTES
Problem: Mobility Impaired (Adult and Pediatric) Goal: *Acute Goals and Plan of Care (Insert Text) STG: 
(1.)Mr. Massiel Kumar will move from supine to sit and sit to supine  with MINIMAL ASSIST within 3 treatment day(s). (2.)Mr. Massiel Kumar will transfer from bed to chair and chair to bed with MODERATE ASSIST using the least restrictive device within 3 treatment day(s). Met 1/14 LTG: 
(1.)Mr. Massiel Kumar will move from supine to sit and sit to supine  in bed with STAND BY ASSIST within 5-7 treatment day(s). (2.)Mr. Massiel Kumar will transfer from bed to chair and chair to bed with CONTACT GUARD ASSIST using the least restrictive device within 5-7 treatment day(s). (3.)Mr. Massiel Kumar will ambulate with MIN TO MODERATE ASSIST for 15 feet with the least restrictive device within 5-7  treatment day(s). ________________________________________________________________________________________________ PHYSICAL THERAPY: Daily Note, Treatment Day: 4th, AM 1/16/2019INPATIENT: Hospital Day: 7 Payor: Julio Cesar Mccormick / Plan: [address] / Product Type: [employer] Medicare /  
  
NAME/AGE/GENDER: Jesús Waite is a 77 y.o. male PRIMARY DIAGNOSIS: PNA (pneumonia) [J18.9] Hypoxia [R09.02] Hyperkalemia [E87.5] Respiratory failure with hypercapnia (HCC) [J96.92] PNA (pneumonia) PNA (pneumonia) ICD-10: Treatment Diagnosis:  
 · Generalized Muscle Weakness (M62.81) · Other abnormalities of gait and mobility (R26.89) Precaution/Allergies: 
Patient has no known allergies. ASSESSMENT:  
Mr. Massiel Kumar is supine upon arrival.  Bed mobility as follows: supine>EOB with Min A x 2, sat on EOB for couple of min then work on sit<>stand x 3 then walk 20 ft to chair. He remain in the chair with alarm on and call light near. This section established at most recent assessment PROBLEM LIST (Impairments causing functional limitations): 1. Decreased Strength 2. Decreased ADL/Functional Activities 3. Decreased Transfer Abilities 4. Decreased Ambulation Ability/Technique 5. Decreased Balance 6. Decreased Activity Tolerance 7. Increased Shortness of Breath 8. Decreased [place] with Home Exercise Program 
 INTERVENTIONS PLANNED: (Benefits and precautions of physical therapy have been discussed with the patient.) 1. Balance Exercise 2. Bed Mobility 3. Gait Training 4. Home Exercise Program (HEP) 5. Therapeutic Activites 6. Therapeutic Exercise/Strengthening 7. Transfer Training TREATMENT PLAN: Frequency/Duration: daily for duration of hospital stay Rehabilitation Potential For Stated Goals: Fair RECOMMENDED REHABILITATION/EQUIPMENT: (at time of discharge pending progress): Due to the probability of continued deficits (see above) this patient will likely need continued skilled physical therapy after discharge. Equipment:  
? Walkers, Type: Rolling Walker;   
    
 
 
 
HISTORY:  
History of Present Injury/Illness (Reason for Referral): 
Patient states he has a history of falls; sometimes he can walk and sometimes he can't. Patient is not a good historian. Family not present during evaluation. PER MD NOTES: 
 \"77year-old male history of coronary artery disease, ischemic cardipmyopathy, hypertension, factor V Leiden, CABG, AICD for nonsustained V. tach, phlebitis, DVT, paroxysmal atrial fibrillation, aortic stenosis presents from his primary care office for pneumonia. Pt does have chronic sob on exertion since cabg- several years ago. Since past 2 weeks- nasal congestion,increased sob-- no fever or headache or dizziness or chest pain. Went to pcp today for his INR check- daughter complained that pt was looking ill- wanted pcp to evaluate- found to be hypoxic - oxygen sat 83%- was given breathing treatment- was told that has pneumonia- sent to er for further evaluation. In er found to have oxygen saturation 89- cxr- cardiomegaly with infiltrate- started in rocephin and zithromax. Mild elevated d-dimer- ordered ct chest with contrast - pending. Pt will be admitted for acute on chronic sob-secondary to pneumonia. \" 
 
 
Past Medical History/Comorbidities:  
Mr. Felecia Toscano  has a past medical history of AICD (automatic cardioverter/defibrillator) present (10/27/2015), Aortic stenosis, mild to moderate (10/27/2015), Cardiomyopathy, ischemic (8/17/2012), Carotid occlusion, bilateral (8/20/2012), Chronic back pain (2/12/2015), Chronic rhinitis (93/15/2599), Chronic systolic congestive heart failure ([facility] (10/27/2015), Coronary artery disease (8/17/2012), DVT, lower extremity, recurrent ([facility] Factor 5 Leiden mutation, heterozygous ([facility] (8/17/2012), Heart attack ([facility] History of alcoholism ([facility] (quit age 34), History of complete eye exam (10/2016), smoking (quit after 19), Hyperlipidemia (8/17/2012), Hypertension (8/17/2012), NSVT (nonsustained ventricular tachycardia) ([facility] Obesity (8/17/2012), Osteoarthritis (2/12/2015), Pacemaker, Paroxysmal atrial fibrillation ([facility] (10/27/2015), Phlebitis and thrombophlebitis (1995, 2004), S/P total knee arthroplasty (2/26/2015), Tobacco abuse (2/12/2015), and Wears dentures. Mr. Felecia Toscano  has a past surgical history that includes hx cataract removal; hx lipoma resection (1998); hx angioplasty (1999); hx implantable cardioverter defibrillator (12/18/2011); hx coronary artery bypass graft (x 3, 12/2011); hx orthopaedic (Left); and hx lymph node dissection. Social History/Living Environment:  
Home Environment: Apartment # Steps to Enter: 14 
Rails to Enter: Yes One/Two Story Residence: Two story(lives on second floor) Living Alone: Yes Support Systems: Friends \ neighbors [facility] Current DME Used/Available at Home: Commode, bedsidehas a daughter; unclear about living situation. Prior Level of Function/Work/Activity: Patient says he was able to ambulate but has history of falls. Dominant Side:  
      RIGHT Personal Factors:   
      Sex:  male Age:  77 y.o. Number of Personal Factors/Comorbidities that affect the Plan of Care: 1-2: MODERATE COMPLEXITY EXAMINATION:  
Most Recent Physical Functioning:  
Gross Assessment: 3-/5 throughout Balance: 
  Bed Mobility: 
Rolling: Contact guard assistance Supine to Sit: Minimum assistance Sit to Supine: (left in chair) Transfers: 
Sit to Stand: Minimum assistance Stand to Sit: Minimum assistance Bed to Chair: Minimum assistance Duration: 30 Minutes Gait:Patient unable to ambulate at this time. Speed/Blanca: Shuffled; Slow Step Length: Left shortened;Right shortened Gait Abnormalities: Decreased step clearance Distance (ft): 20 Feet (ft)(breakfast is here) Assistive Device: Walker, rolling Ambulation - Level of Assistance: Minimal assistance Body Structures Involved: 1. Nerves 2. Heart 3. Lungs 4. Joints 5. Muscles Body Functions Affected: 1. Sensory/Pain 2. Neuromusculoskeletal 
3. Movement Related Activities and Participation Affected: 1. General Tasks and Demands 2. Mobility 3. Self Care 4. Domestic Life 5. Community, Social and [place] Number of elements that affect the Plan of Care: 3: MODERATE COMPLEXITY CLINICAL PRESENTATION:  
Presentation: Evolving clinical presentation with changing clinical characteristics: MODERATE COMPLEXITY CLINICAL DECISION MAKING:  
[facility] -PAC 6 Clicks Basic Mobility Inpatient Short Form How much difficulty does the patient currently have. .. Unable A Lot A Little None 1. Turning over in bed (including adjusting bedclothes, sheets and blankets)? [] 1   [x] 2   [] 3   [] 4  
2.   Sitting down on and standing up from a chair with arms ( e.g., wheelchair, bedside commode, etc.)   [] 1   [x] 2   [] 3   [] 4  
 3. Moving from lying on back to sitting on the side of the bed? [] 1   [x] 2   [] 3   [] 4 How much help from another person does the patient currently need. .. Total A Lot A Little None 4. Moving to and from a bed to a chair (including a wheelchair)? [x] 1   [] 2   [] 3   [] 4  
5. Need to walk in hospital room? [x] 1   [] 2   [] 3   [] 4  
6. Climbing 3-5 steps with a railing? [x] 1   [] 2   [] 3   [] 4  
© 2007, Trustees of [facility], under license to [employer]. All rights reserved Score:  Initial: 9 Most Recent: X (Date: -- ) Interpretation of Tool:  Represents activities that are increasingly more difficult (i.e. Bed mobility, Transfers, Gait). Score 24 23 22-20 19-15 14-10 9-7 6 Modifier CH CI CJ CK CL CM CN   
 
? Mobility - Walking and Moving Around:  
  - CURRENT STATUS: CM - 80%-99% impaired, limited or restricted  - GOAL STATUS: CL - 60%-79% impaired, limited or restricted  - D/C STATUS:  ---------------To be determined--------------- Payor: [employer] MEDICARE / Plan: [address] / Product Type: [employer] Medicare /   
 
Medical Necessity:    
· Patient is expected to demonstrate progress in strength, balance and functional technique to increase independence with mobility and improve safety during mobility. Reason for Services/Other Comments: 
· Patient continues to demonstrate capacity to improve strength, mobility which will increase independence and increase safety. Use of outcome tool(s) and clinical judgement create a POC that gives a: Questionable prediction of patient's progress: MODERATE COMPLEXITY  
  
 
 
 
TREATMENT:  
(In addition to Assessment/Re-Assessment sessions the following treatments were rendered) Pre-treatment Symptoms/Complaints: I want to eat breakfast in the chair Pain: Initial: visual cues Pain Intensity 1: 0(0/10 after therapy)  Post Session: Therapeutic Activity: (  30 Minutes ):  Therapeutic activities including repeated sit<>stand & stand pivot transfer with walker,  
 Date: 
1/13/19 Date: 
1/15 Date: Activity/Exercise Parameters Parameters Parameters Ankle pumps 15 B 15 Quad sets 15 B  15   
glute sets 15 B 15 Hip ABD/ADD 15 B 15 Heel slides 15 B 15 LAQs 15 B 15   
marching 15 B 15 Sit to stand  x5 All exercises are B Braces/Orthotics/Lines/Etc:  
· dukes catheter · ICU lines, O2 via nasal cannula Treatment/Session Assessment:   
· Response to Treatment:  Continue to make slow and steady progress · Interdisciplinary Collaboration:  
o Registered Nurse · After treatment position/precautions:  
o Up in chair 
o Bed/Chair-wheels locked 
o Call light within reach 
o RN notified · Compliance with Program/Exercises: Will assess as treatment progresses · Recommendations/Intent for next treatment session: \"Next visit will focus on advancements to more challenging activities and reduction in assistance provided\". Total Treatment Duration: PT Patient Time In/Time Out Time In: 0830 Time Out: 1722 Meeta Pena, PTA

## 2019-01-17 ENCOUNTER — APPOINTMENT (OUTPATIENT)
Dept: GENERAL RADIOLOGY | Age: 79
DRG: 193 | End: 2019-01-17
Attending: INTERNAL MEDICINE
Payer: MEDICARE

## 2019-01-17 PROBLEM — J96.01 ACUTE RESPIRATORY FAILURE WITH HYPOXIA AND HYPERCAPNIA (HCC): Status: ACTIVE | Noted: 2019-01-17

## 2019-01-17 PROBLEM — J96.02 ACUTE RESPIRATORY FAILURE WITH HYPOXIA AND HYPERCAPNIA (HCC): Status: ACTIVE | Noted: 2019-01-17

## 2019-01-17 LAB
ARTERIAL PATENCY WRIST A: YES
BASE EXCESS BLD CALC-SCNC: 0 MMOL/L
BDY SITE: NORMAL
BODY TEMPERATURE: 98.6
CO2 BLD-SCNC: 27 MMOL/L
COLLECT TIME,HTIME: 400
FLOW RATE ISTAT,IFRATE: 2 L/MIN
GAS FLOW.O2 O2 DELIVERY SYS: NORMAL L/MIN
HCO3 BLD-SCNC: 25.3 MMOL/L (ref 22–26)
INR PPP: 1.4
PCO2 BLD: 42.1 MMHG (ref 35–45)
PH BLD: 7.39 [PH] (ref 7.35–7.45)
PO2 BLD: 79 MMHG (ref 75–100)
PROTHROMBIN TIME: 17.4 SEC (ref 11.7–14.5)
SAO2 % BLD: 95 % (ref 95–98)
SERVICE CMNT-IMP: NORMAL
SPECIMEN TYPE: NORMAL

## 2019-01-17 PROCEDURE — 36415 COLL VENOUS BLD VENIPUNCTURE: CPT

## 2019-01-17 PROCEDURE — 74011000250 HC RX REV CODE- 250: Performed by: INTERNAL MEDICINE

## 2019-01-17 PROCEDURE — 97110 THERAPEUTIC EXERCISES: CPT

## 2019-01-17 PROCEDURE — 74011250636 HC RX REV CODE- 250/636: Performed by: FAMILY MEDICINE

## 2019-01-17 PROCEDURE — 65270000029 HC RM PRIVATE

## 2019-01-17 PROCEDURE — 74011250636 HC RX REV CODE- 250/636: Performed by: INTERNAL MEDICINE

## 2019-01-17 PROCEDURE — 36600 WITHDRAWAL OF ARTERIAL BLOOD: CPT

## 2019-01-17 PROCEDURE — 74011250637 HC RX REV CODE- 250/637: Performed by: INTERNAL MEDICINE

## 2019-01-17 PROCEDURE — 74011250637 HC RX REV CODE- 250/637: Performed by: FAMILY MEDICINE

## 2019-01-17 PROCEDURE — 77010033678 HC OXYGEN DAILY

## 2019-01-17 PROCEDURE — 85610 PROTHROMBIN TIME: CPT

## 2019-01-17 PROCEDURE — 94640 AIRWAY INHALATION TREATMENT: CPT

## 2019-01-17 PROCEDURE — 94760 N-INVAS EAR/PLS OXIMETRY 1: CPT

## 2019-01-17 PROCEDURE — 82803 BLOOD GASES ANY COMBINATION: CPT

## 2019-01-17 PROCEDURE — 99232 SBSQ HOSP IP/OBS MODERATE 35: CPT | Performed by: INTERNAL MEDICINE

## 2019-01-17 PROCEDURE — 74011000258 HC RX REV CODE- 258: Performed by: INTERNAL MEDICINE

## 2019-01-17 PROCEDURE — 71046 X-RAY EXAM CHEST 2 VIEWS: CPT

## 2019-01-17 RX ADMIN — HYDRALAZINE HYDROCHLORIDE 10 MG: 20 INJECTION INTRAMUSCULAR; INTRAVENOUS at 03:25

## 2019-01-17 RX ADMIN — ENOXAPARIN SODIUM 40 MG: 40 INJECTION SUBCUTANEOUS at 21:55

## 2019-01-17 RX ADMIN — HYDROCODONE BITARTRATE AND ACETAMINOPHEN 1 TABLET: 10; 325 TABLET ORAL at 17:37

## 2019-01-17 RX ADMIN — AMLODIPINE BESYLATE 10 MG: 10 TABLET ORAL at 09:06

## 2019-01-17 RX ADMIN — PRAVASTATIN SODIUM 40 MG: 20 TABLET ORAL at 21:54

## 2019-01-17 RX ADMIN — BISACODYL 10 MG: 5 TABLET, COATED ORAL at 21:53

## 2019-01-17 RX ADMIN — GUAIFENESIN 1200 MG: 600 TABLET, EXTENDED RELEASE ORAL at 09:06

## 2019-01-17 RX ADMIN — LORATADINE 10 MG: 10 TABLET ORAL at 17:36

## 2019-01-17 RX ADMIN — IPRATROPIUM BROMIDE AND ALBUTEROL SULFATE 3 ML: .5; 3 SOLUTION RESPIRATORY (INHALATION) at 20:16

## 2019-01-17 RX ADMIN — Medication 10 ML: at 05:49

## 2019-01-17 RX ADMIN — IPRATROPIUM BROMIDE AND ALBUTEROL SULFATE 3 ML: .5; 3 SOLUTION RESPIRATORY (INHALATION) at 07:36

## 2019-01-17 RX ADMIN — PANTOPRAZOLE SODIUM 40 MG: 40 TABLET, DELAYED RELEASE ORAL at 09:06

## 2019-01-17 RX ADMIN — GUAIFENESIN 1200 MG: 600 TABLET, EXTENDED RELEASE ORAL at 21:54

## 2019-01-17 RX ADMIN — CALCIUM CARBONATE 200 MG: 500 TABLET, CHEWABLE ORAL at 13:05

## 2019-01-17 RX ADMIN — CARVEDILOL 25 MG: 25 TABLET, FILM COATED ORAL at 09:06

## 2019-01-17 RX ADMIN — IPRATROPIUM BROMIDE AND ALBUTEROL SULFATE 3 ML: .5; 3 SOLUTION RESPIRATORY (INHALATION) at 15:15

## 2019-01-17 RX ADMIN — ENOXAPARIN SODIUM 40 MG: 40 INJECTION SUBCUTANEOUS at 09:07

## 2019-01-17 RX ADMIN — CALCIUM CARBONATE 200 MG: 500 TABLET, CHEWABLE ORAL at 17:36

## 2019-01-17 RX ADMIN — CEFTRIAXONE 1 G: 1 INJECTION, POWDER, FOR SOLUTION INTRAMUSCULAR; INTRAVENOUS at 19:56

## 2019-01-17 RX ADMIN — AZITHROMYCIN MONOHYDRATE 500 MG: 500 INJECTION, POWDER, LYOPHILIZED, FOR SOLUTION INTRAVENOUS at 19:58

## 2019-01-17 RX ADMIN — CALCIUM CARBONATE 200 MG: 500 TABLET, CHEWABLE ORAL at 09:00

## 2019-01-17 RX ADMIN — DOCUSATE SODIUM 400 MG: 100 CAPSULE, LIQUID FILLED ORAL at 21:53

## 2019-01-17 RX ADMIN — HYDROCODONE BITARTRATE AND ACETAMINOPHEN 1 TABLET: 10; 325 TABLET ORAL at 09:05

## 2019-01-17 RX ADMIN — LISINOPRIL 20 MG: 20 TABLET ORAL at 09:06

## 2019-01-17 RX ADMIN — NYSTATIN: 100000 POWDER TOPICAL at 09:00

## 2019-01-17 RX ADMIN — ASPIRIN 81 MG: 81 TABLET, COATED ORAL at 09:06

## 2019-01-17 RX ADMIN — IPRATROPIUM BROMIDE AND ALBUTEROL SULFATE 3 ML: .5; 3 SOLUTION RESPIRATORY (INHALATION) at 11:11

## 2019-01-17 RX ADMIN — FUROSEMIDE 40 MG: 10 INJECTION, SOLUTION INTRAMUSCULAR; INTRAVENOUS at 09:07

## 2019-01-17 RX ADMIN — WARFARIN SODIUM 12 MG: 5 TABLET ORAL at 17:36

## 2019-01-17 RX ADMIN — NYSTATIN: 100000 POWDER TOPICAL at 18:00

## 2019-01-17 RX ADMIN — CARVEDILOL 25 MG: 25 TABLET, FILM COATED ORAL at 17:36

## 2019-01-17 RX ADMIN — Medication 10 ML: at 22:00

## 2019-01-17 NOTE — PROGRESS NOTES
Am assessment completed. Pt is alert and oriented x 3 with some confusion. Verbalizes need well. Up with one person assist and walker. Working with therapy. Complains of back pain a lot. Pt did not like CPAP. Midline in place received IV lasix. Mckeon ordered to be removed.

## 2019-01-17 NOTE — PROGRESS NOTES
Problem: Interdisciplinary Rounds Goal: Interdisciplinary Rounds Interdisciplinary team rounds were held 1/17/2019 with the following team members:Care Management, Nursing, Nutrition, Pharmacy, Physical Therapy and Physician and the patient. Plan of care discussed. See clinical pathway and/or care plan for interventions and desired outcomes.

## 2019-01-17 NOTE — PROGRESS NOTES
Shift assessment completed via doc flow sheet. Pt A & O x 4. Lung sounds clear but diminished , HR WNL, irregular rythym. Remote tele in place. Abdomen soft and non tender. C/o back pain rated 9/10. PRN Morphine 1mg IVP administered per MD order. Right upper arm Midline c/d/i, no s/sx of infection/infiltration noted. Flushed easily, no blood return. Pt able to make needs known. No concerns at this time. Bed low and locked. Call light within reach. Will continue to monitor.

## 2019-01-17 NOTE — PROGRESS NOTES
Hospitalist Progress Note Admit Date:  1/10/2019  4:02 PM  
Name:  Wing Hunt Age:  77 y.o. 
:  1940 MRN:  [id number] PCP:  Alex Palmer MD 
Treatment Team: Attending Provider: Maddie Salcido MD; Consulting Provider: Guillermo Grace MD; Utilization Review: Alba Fontenot, RN; Hospitalist: Maddie Salcido MD 
 
Subjective:  
Patient admitted 1/10 for acute hypoxic respiratory failure and CAP. : Up in chair. Tolerated CPAP until about 0330 last night. Overall still does not like wearing any kind of mask at night. On 3L NC currently. Appetite is good. No issues and ROS otherwise negative. Objective:  
 
Patient Vitals for the past 24 hrs: 
 Temp Pulse Resp BP SpO2  
19 1117     97 % 19 1115     97 % 19 0833 97.9 °F (36.6 °C) 85 17 147/70 97 % 19 0809     97 % 19 0749     97 % 19 0325    (!) 171/93   
19 0315 97.6 °F (36.4 °C) 77 18 (!) 171/93 95 % 19 2346 97.9 °F (36.6 °C) 72 18 114/66 98 % 19 2331     97 % 19 2001 97.7 °F (36.5 °C) 77 25 100/64 98 % 19 1958     96 % 19 1702 97.5 °F (36.4 °C) 72 26 125/75 96 % 19 1555   (!) 32 124/65 96 % 19 1544     93 % 19 1540  78 24 104/50 92 % 19 1525  75 30 90/54 95 % 19 1510 97.9 °F (36.6 °C) 78 23 109/54 93 % 19 1455  74 (!) 37 99/75 93 % 19 1446  69 25 90/49 96 % 19 1425  75 (!) 33 110/56 93 % 19 1410  75 23 110/64 90 % 19 1357  70 (!) 49 95/52 94 % 19 1340  75 (!) 35 92/55 93 % 19 1325  75 29 114/62 94 % 19 1310  66 25 (!) 82/46 93 % 19 1255  74 (!) 32 101/50 93 % Oxygen Therapy O2 Sat (%): 97 % (19 111) Pulse via Oximetry: 84 beats per minute (19 111) O2 Device: Nasal cannula (19 111) O2 Flow Rate (L/min): 3 l/min(o2 decreased to 2l NC no distress noted) (01/17/19 1117) O2 Temperature: (.) (01/15/19 2127) FIO2 (%): 40 % (01/16/19 2331) Intake/Output Summary (Last 24 hours) at 1/17/2019 1249 Last data filed at 1/17/2019 1215 Gross per 24 hour Intake  Output 3700 ml Net -3700 ml *Note that automatically entered I/Os may not be accurate; dependent on patient compliance with collection and accurate  by assistants. General:    Well nourished. Alert. CV:   RRR. No murmur, rub, or gallop. Lungs:   CTAB. No wheezing, rhonchi, or rales. Abdomen:   Soft, nontender, nondistended. Extremities: Warm and dry. 2+ edema b/l LEs. Skin:     No rashes or jaundice. Neuro:  No gross focal deficits Data Review: 
I have reviewed all labs, meds, telemetry events, and studies from the last 24 hours: 
 
Recent Results (from the past 24 hour(s)) POC G3 Collection Time: 01/17/19  4:07 AM  
Result Value Ref Range Device: NASAL CANNULA pH (POC) 7.387 7.35 - 7.45    
 pCO2 (POC) 42.1 35 - 45 MMHG  
 pO2 (POC) 79 75 - 100 MMHG  
 HCO3 (POC) 25.3 22 - 26 MMOL/L  
 sO2 (POC) 95 95 - 98 % Base excess (POC) 0 mmol/L Allens test (POC) YES Site LEFT RADIAL Patient temp. 98.6 Specimen type (POC) ARTERIAL Performed by [facility]   
 CO2, POC 27 MMOL/L Flow rate (POC) 2.000 L/min COLLECT TIME 400 PROTHROMBIN TIME + INR Collection Time: 01/17/19  5:48 AM  
Result Value Ref Range Prothrombin time 17.4 (H) 11.7 - 14.5 sec INR 1.4 All Micro Results None Results for orders placed or performed during the hospital encounter of 01/10/19  
2D ECHO COMPLETE ADULT (TTE) W OR WO CONTR Narrative [place] [facility] [address] [place], [address] [facility] 
[phone] Transthoracic Echocardiogram 
2D, M-mode, Doppler, and Color Doppler Patient: Juancarlos Cosby 
MR #: [id number] : 1940 Age: 77 years Gender: Male Study date: 2019 Account #: [de-identified] Height: 72 in 
Weight: 278.5 lb 
BSA: 2.45 mï¾² Status:Routine Location: 376 BP: 105/ 59 Allergies: NO KNOWN ALLERGENS Sonographer:  Snehal Henriquez RDCS Group:  [facility] Referring Physician:  Eusebio Aguilar MD 
Reading Physician:  Steph Davalos MD [facility] [place] [facility] INDICATIONS: CHF PROCEDURE: This was a routine study. A transthoracic echocardiogram was 
performed. The study included complete 2D imaging, M-mode, complete spectral 
Doppler, and color Doppler. Intravenous contrast (Definity, 3 ml) was 
administered. Echocardiographic views were limited by poor acoustic window 
availability and poor body habitus. This was a technically difficult study. LEFT VENTRICLE: Size was at the upper limits of normal. Systolic function was 
mildly to moderately reduced. Ejection fraction was estimated in the range of 
40 % to 45 %. There was moderate hypokinesis of the basal inferoseptal  
wall(s). There was akinesis of the basal-mid inferior wall(s). There was moderate 
concentric hypertrophy. Left ventricular diastolic dysfunction Grade 2. Average E/e' of 26.7. RIGHT VENTRICLE: The ventricle was dilated. Systolic function was low normal. 
 A 
pacing wire was present. LEFT ATRIUM: The atrium was moderately to markedly dilated. RIGHT ATRIUM: Not well visualized. SYSTEMIC VEINS: IVC: The inferior vena cava was dilated. The respirophasic 
change in diameter was less than 50%. AORTIC VALVE: The valve was trileaflet. DI: 0.43. The aortic valve area by  
the 
continuity equation was 1.46 cm2. The peak velocity was 3.24 m/s. The mean 
pressure gradient was 19 mmHg. The peak pressure gradient was 42 mmHg. There 
was no insufficiency. MITRAL VALVE: Valve structure was normal. There was no evidence for stenosis. There was trivial regurgitation. TRICUSPID VALVE: The valve structure was normal. There was no evidence for 
stenosis. There was trivial regurgitation. PULMONIC VALVE: Not well visualized. PERICARDIUM: There was no pericardial effusion. AORTA: The root exhibited normal size. SUMMARY: 
 
-  Left ventricle: Size was at the upper limits of normal. Systolic function 
was mildly to moderately reduced. Ejection fraction was estimated in the  
range 
of 40 % to 45 %. There was moderate hypokinesis of the basal inferoseptal 
wall(s). There was akinesis of the basal-mid inferior wall(s). There was 
moderate concentric hypertrophy. 
 
-  Right ventricle: The ventricle was dilated. -  Left atrium: The atrium was moderately to markedly dilated. -  Inferior vena cava, hepatic veins: The inferior vena cava was dilated. The 
respirophasic change in diameter was less than 50%. -  Aortic valve: The aortic valve area by the continuity equation was 1.46  
cm2. SYSTEM MEASUREMENT TABLES 
 
2D mode Left Atrium Systolic Volume Index; Method of Disks, Biplane; 2D mode;: 55.9 
ml/m2 IVS/LVPW (2D): 0.9 IVSd (2D): 1.9 cm LVIDd (2D): 5.5 cm LVIDs (2D): 5 cm 
LVOT Area (2D): 4.5 cm2 LVPWd (2D): 2 cm RVIDd (2D): 3.7 cm Unspecified Scan Mode Peak Grad; Mean; Antegrade Flow: 38 mm[Hg] Vmax; Antegrade Flow: 292 cm/s LVOT Diam: 2.4 cm Prepared and signed by 
 
Ben Wooten. Anam Boykin MD [facility] [place] Signed 14-Jan-2019 16:10:12 Current Meds: 
Current Facility-Administered Medications Medication Dose Route Frequency  warfarin (COUMADIN) tablet 12 mg  12 mg Oral QPM  
 furosemide (LASIX) injection 40 mg  40 mg IntraVENous DAILY  albuterol-ipratropium (DUO-NEB) 2.5 MG-0.5 MG/3 ML  3 mL Nebulization QID RT  
 enoxaparin (LOVENOX) injection 40 mg  40 mg SubCUTAneous Q12H  pantoprazole (PROTONIX) tablet 40 mg  40 mg Oral ACB  calcium carbonate (TUMS) chewable tablet 200 mg [elemental]  200 mg Oral TID WITH MEALS  
  azithromycin (ZITHROMAX) 500 mg in 0.9% sodium chloride (MBP/ADV) 250 mL  500 mg IntraVENous Q24H  cefTRIAXone (ROCEPHIN) 1 g in 0.9% sodium chloride (MBP/ADV) 50 mL  1 g IntraVENous Q24H  nystatin (MYCOSTATIN) 100,000 unit/gram powder   Topical BID  influenza vaccine 2018-19 (6 mos+)(PF) (FLUARIX QUAD/FLULAVAL QUAD) injection 0.5 mL  0.5 mL IntraMUSCular PRIOR TO DISCHARGE  sodium chloride (NS) flush 10 mL  10 mL InterCATHeter Q8H  
 bisacodyl (DULCOLAX) tablet 10 mg  10 mg Oral QHS  docusate sodium (COLACE) capsule 400 mg  400 mg Oral QHS  amLODIPine (NORVASC) tablet 10 mg  10 mg Oral DAILY  aspirin delayed-release tablet 81 mg  81 mg Oral DAILY  carvedilol (COREG) tablet 25 mg  25 mg Oral BID WITH MEALS  guaiFENesin ER (MUCINEX) tablet 1,200 mg  1,200 mg Oral BID  
 HYDROcodone-acetaminophen (NORCO)  mg tablet 1 Tab  1 Tab Oral Q6H PRN  
 lisinopril (PRINIVIL, ZESTRIL) tablet 20 mg  20 mg Oral DAILY  pravastatin (PRAVACHOL) tablet 40 mg  40 mg Oral QHS  sodium chloride (NS) flush 5-40 mL  5-40 mL IntraVENous PRN  
 acetaminophen (TYLENOL) tablet 650 mg  650 mg Oral Q4H PRN  
 morphine injection 1 mg  1 mg IntraVENous Q4H PRN  
 naloxone (NARCAN) injection 0.4 mg  0.4 mg IntraVENous PRN  
 ondansetron (ZOFRAN) injection 4 mg  4 mg IntraVENous Q4H PRN  
 hydrALAZINE (APRESOLINE) 20 mg/mL injection 10 mg  10 mg IntraVENous Q6H PRN  
 loratadine (CLARITIN) tablet 10 mg  10 mg Oral DAILY Other Studies (last 24 hours): No results found. Assessment and Plan:  
 
Hospital Problems as of 1/17/2019 Date Reviewed: 10/2/2018 Codes Class Noted - Resolved POA Acute respiratory failure with hypoxia and hypercapnia (HCC) ICD-10-CM: J96.01, J96.02 
ICD-9-CM: 518.81  1/17/2019 - Present Unknown Obesity hypoventilation syndrome (HCC) ICD-10-CM: A92.9 ICD-9-CM: 278.03  1/14/2019 - Present Unknown COPD exacerbation ([facility] ICD-10-CM: J44.1 ICD-9-CM: 491.21  1/11/2019 - Present Unknown Hyperkalemia ICD-10-CM: E87.5 ICD-9-CM: 276.7  1/10/2019 - Present Unknown * (Principal) PNA (pneumonia) ICD-10-CM: J18.9 ICD-9-CM: 871  1/10/2019 - Present Unknown Essential hypertension ICD-10-CM: I10 
ICD-9-CM: 401.9  2/6/2017 - Present Yes Chronic systolic congestive heart failure (HCC) ICD-10-CM: I50.22 ICD-9-CM: 428.22, 428.0  10/27/2015 - Present Yes AICD (automatic cardioverter/defibrillator) present ICD-10-CM: Z95.810 ICD-9-CM: V45.02  10/27/2015 - Present Yes Paroxysmal atrial fibrillation (HCC) ICD-10-CM: I48.0 ICD-9-CM: 427.31  10/27/2015 - Present Yes Aortic stenosis, mild to moderate ICD-10-CM: I35.0 ICD-9-CM: 424.1  10/27/2015 - Present Yes Osteoarthritis (Chronic) ICD-10-CM: M19.90 ICD-9-CM: 715.90  2/12/2015 - Present Overview Signed 10/27/2015  9:58 AM by Melanie Valadez With severe chronic low back pain. Chronic back pain (Chronic) ICD-10-CM: M54.9, G89.29 ICD-9-CM: 724.5, 338.29  2/12/2015 - Present Yes Obesity (Chronic) ICD-10-CM: V30.4 ICD-9-CM: 278.00  8/17/2012 - Present Yes Coronary artery disease (Chronic) ICD-10-CM: I25.10 ICD-9-CM: 414.00  8/17/2012 - Present Yes Overview Signed 8/17/2012  3:57 PM by Jeanie Hernandez Diagnosed 1999, CABG 12/2011 Cardiomyopathy, ischemic (Chronic) ICD-10-CM: I25.5 ICD-9-CM: 414.8  8/17/2012 - Present Yes Overview Signed 8/17/2012  3:58 PM by Jeanie Hernandez Pacemaker AICD 12/2011,  35% LUEF Factor 5 Leiden mutation, heterozygous ([facility] (Chronic) ICD-10-CM: O05.82 
ICD-9-CM: 289.81  8/17/2012 - Present Yes Overview Signed 8/17/2012  4:02 PM by Jeanie Hernandez Chronic anticoag., hx DVT Hyperlipidemia (Chronic) ICD-10-CM: W38.9 ICD-9-CM: 272.4  8/17/2012 - Present Yes Plan: # Acute hypoxic/hypercapnic respiratory failure - 2/2 CAP with superimposed, untreated JACOB/OHS 
           - Last dose abx 1/17 
           - Transferred to floor 1/16 -- did ok with CPAP but doesn't like masks at night. O2 at 3L NC currently. Wean as able. 
           - Appreciate pulm.  
  
# CAP 
            - ceftriaxone/azithro, last dose 1/17 
  
# COPD 
            - steroids dc, nebs 
  
# Chronic dCHF 
            - euvolemic; home meds 
            - TTE unchanged from 2017 
  
# HTN 
            - home meds 
  
# H/o DVT/Factor V 
            - warfarin DC planning/Dispo: Wean O2 as able, may need at dc. Diet:  DIET CARDIAC 
DVT ppx:  warfarin Signed: 
Medardo Morales MD

## 2019-01-17 NOTE — PROGRESS NOTES
Warfarin dosing per pharmacist 
 
Regina Levine is a 77 y.o. male. @ZRDENISSE(91)@    @[facility](62)@ Indication:  atrial fibrillation Goal INR:  2 - 3 Home dose:   5 - 10 mg? Risk factors or significant drug interactions:  macrolide antibiotics Other anticoagulants:  none Daily Monitoring Date  INR     Warfarin dose HGB              Notes 1/17                 1.4                12 mg 
1/16                 1.5                10 mg 
1/15                 1.4                7.5 mg 
1/14                 1.2                7.5 mg                
1/13                 1.2                  5 mg                 
1/12                 1.2                  5 mg                14 
1/11                 1.5                  5 mg                14.5 
1/10  1.6  5 mg  15.2 Will increase Warfarin to 12 mg every evening. Will continue to monitor closely. Thank you, Mela MerrillD, BCPS

## 2019-01-17 NOTE — PROGRESS NOTES
Rory Valadez Admission Date: 1/10/2019 Daily Progress Note: 1/17/2019 The patient's chart is reviewed and the patient is discussed with the staff. 
 
78 y.o.  male  evaluated at the request of Dr. Mckenna Becerra. Pt has a history of coronary artery disease, ischemic cardipmyopathy, hypertension, factor V Leiden, CABG, AICD for nonsustained V. tach, phlebitis, DVT, paroxysmal atrial fibrillation, aortic stenosis presents from his primary care office for pneumonia.  
Pt does have chronic sob on exertion since cabg- several years ago. Since past 2 weeks- nasal congestion,increased sob-- no fever or headache or dizziness or chest pain. Went to pcp1/10 for his INR check- daughter complained that pt was looking ill- wanted pcp to evaluate- found to be hypoxic - oxygen sat 83%- was given breathing treatment- was told that has pneumonia- sent to er for further evaluation. Subjective:  
 
Patient sitting up in chair. States that breathing is stable. But is still on 3L O2 which is a new requirement for him. Coughing up only clear mucus at this point. Due to complete his abx course today. Ongoing LE edema. No new issues. Current Facility-Administered Medications Medication Dose Route Frequency  furosemide (LASIX) injection 40 mg  40 mg IntraVENous DAILY  warfarin (COUMADIN) tablet 10 mg  10 mg Oral QPM  
 albuterol-ipratropium (DUO-NEB) 2.5 MG-0.5 MG/3 ML  3 mL Nebulization QID RT  
 enoxaparin (LOVENOX) injection 40 mg  40 mg SubCUTAneous Q12H  pantoprazole (PROTONIX) tablet 40 mg  40 mg Oral ACB  calcium carbonate (TUMS) chewable tablet 200 mg [elemental]  200 mg Oral TID WITH MEALS  
 azithromycin (ZITHROMAX) 500 mg in 0.9% sodium chloride (MBP/ADV) 250 mL  500 mg IntraVENous Q24H  cefTRIAXone (ROCEPHIN) 1 g in 0.9% sodium chloride (MBP/ADV) 50 mL  1 g IntraVENous Q24H  nystatin (MYCOSTATIN) 100,000 unit/gram powder   Topical BID  
  influenza vaccine 2018-19 (6 mos+)(PF) (FLUARIX QUAD/FLULAVAL QUAD) injection 0.5 mL  0.5 mL IntraMUSCular PRIOR TO DISCHARGE  sodium chloride (NS) flush 10 mL  10 mL InterCATHeter Q8H  
 bisacodyl (DULCOLAX) tablet 10 mg  10 mg Oral QHS  docusate sodium (COLACE) capsule 400 mg  400 mg Oral QHS  amLODIPine (NORVASC) tablet 10 mg  10 mg Oral DAILY  aspirin delayed-release tablet 81 mg  81 mg Oral DAILY  carvedilol (COREG) tablet 25 mg  25 mg Oral BID WITH MEALS  guaiFENesin ER (MUCINEX) tablet 1,200 mg  1,200 mg Oral BID  
 HYDROcodone-acetaminophen (NORCO)  mg tablet 1 Tab  1 Tab Oral Q6H PRN  
 lisinopril (PRINIVIL, ZESTRIL) tablet 20 mg  20 mg Oral DAILY  pravastatin (PRAVACHOL) tablet 40 mg  40 mg Oral QHS  sodium chloride (NS) flush 5-40 mL  5-40 mL IntraVENous PRN  
 acetaminophen (TYLENOL) tablet 650 mg  650 mg Oral Q4H PRN  
 morphine injection 1 mg  1 mg IntraVENous Q4H PRN  
 naloxone (NARCAN) injection 0.4 mg  0.4 mg IntraVENous PRN  
 ondansetron (ZOFRAN) injection 4 mg  4 mg IntraVENous Q4H PRN  
 hydrALAZINE (APRESOLINE) 20 mg/mL injection 10 mg  10 mg IntraVENous Q6H PRN  
 loratadine (CLARITIN) tablet 10 mg  10 mg Oral DAILY Review of Systems Constitutional: negative for fever, chills, sweats Cardiovascular: + LE edema. negative for chest pain, palpitations, syncope Gastrointestinal: negative for dysphagia, reflux, vomiting, diarrhea, abdominal pain, or melena Neurologic: negative for focal weakness, numbness, headache Objective:  
 
Vitals:  
 01/17/19 9600 01/17/19 5017 01/17/19 0809 01/17/19 8751 BP: (!) 171/93   147/70 Pulse:    85 Resp:    17 Temp:    97.9 °F (36.6 °C) SpO2:  97% 97% 91% Weight:      
Height:      
 
Intake and Output:  
01/15 1901 - 01/17 0700 In: 540 [P.O.:240; I.V.:300] Out: 5750 [[id number]] No intake/output data recorded. Physical Exam: Constitution:  the patient is well developed and in no acute distress. Obese EENMT:  Sclera clear, pupils equal, oral mucosa moist 
Respiratory: CTA B, no w/r/r 
Cardiovascular:  RRR without M,G,R 
Gastrointestinal: soft and non-tender; with positive bowel sounds. Musculoskeletal: warm without cyanosis. There is 2+ B lower leg edema. Skin:  no jaundice or rashes, old, healed LLE wounds Neurologic: no gross neuro deficits Psychiatric:  alert and oriented x ppt CHEST XRAY:  
No new imaging LAB No lab exists for component: [place] Recent Labs  
  01/17/19 
0548 01/16/19 
0320 01/15/19 
7771 WBC  --   --  10.2 HGB  --   --  13.6 HCT  --   --  45.1 PLT  --   --  150 INR 1.4 1.5 1.4 Recent Labs  
  01/16/19 
0320 01/15/19 
8526  139  
K 3.9 4.2  103 CO2 30 32 * 127* BUN 46* 47* CREA 1.25 1.31  
CA 7.9* 8.0* ABG:   
Lab Results Component Value Date/Time PHI 7.387 01/17/2019 04:07 AM  
 PCO2I 42.1 01/17/2019 04:07 AM  
 PO2I 79 01/17/2019 04:07 AM  
 HCO3I 25.3 01/17/2019 04:07 AM  
 
 
Assessment:  (Medical Decision Making) Hospital Problems  Date Reviewed: 10/2/2018 Codes Class Noted POA Acute respiratory failure with hypoxia and hypercapnia (HCC) ICD-10-CM: J96.01, J96.02 
ICD-9-CM: 518.81  1/17/2019 Unknown Obesity hypoventilation syndrome (HCC) ICD-10-CM: Q63.8 ICD-9-CM: 278.03  1/14/2019 Unknown COPD exacerbation ([facility] ICD-10-CM: J44.1 ICD-9-CM: 491.21  1/11/2019 Unknown Hyperkalemia ICD-10-CM: E87.5 ICD-9-CM: 276.7  1/10/2019 Unknown Hypoxia ICD-10-CM: R09.02 
ICD-9-CM: 799.02  1/10/2019 Unknown * (Principal) PNA (pneumonia) ICD-10-CM: J18.9 ICD-9-CM: 730  1/10/2019 Unknown Respiratory failure with hypercapnia ([address] ICD-10-CM: J96.92 
ICD-9-CM: 518.81  1/10/2019 Unknown Essential hypertension ICD-10-CM: I10 
ICD-9-CM: 401.9  2/6/2017 Yes Chronic systolic congestive heart failure (HCC) ICD-10-CM: I50.22 ICD-9-CM: 428.22, 428.0  10/27/2015 Yes AICD (automatic cardioverter/defibrillator) present ICD-10-CM: Z95.810 ICD-9-CM: V45.02  10/27/2015 Yes Paroxysmal atrial fibrillation (HCC) ICD-10-CM: I48.0 ICD-9-CM: 427.31  10/27/2015 Yes Aortic stenosis, mild to moderate ICD-10-CM: I35.0 ICD-9-CM: 424.1  10/27/2015 Yes Chronic back pain (Chronic) ICD-10-CM: M54.9, G89.29 ICD-9-CM: 724.5, 338.29  2/12/2015 Yes Obesity (Chronic) ICD-10-CM: D47.5 ICD-9-CM: 278.00  8/17/2012 Yes Coronary artery disease (Chronic) ICD-10-CM: I25.10 ICD-9-CM: 414.00  8/17/2012 Yes Overview Signed 8/17/2012  3:57 PM by Gabriella Garza Diagnosed 1999, CABG 12/2011 Cardiomyopathy, ischemic (Chronic) ICD-10-CM: I25.5 ICD-9-CM: 414.8  8/17/2012 Yes Overview Signed 8/17/2012  3:58 PM by Gabriella Garza Pacemaker AICD 12/2011,  35% LUEF Factor 5 Leiden mutation, heterozygous ([facility] (Chronic) ICD-10-CM: K93.34 
ICD-9-CM: 289.81  8/17/2012 Yes Overview Signed 8/17/2012  4:02 PM by Gabriella Garza Chronic anticoag., hx DVT Hyperlipidemia (Chronic) ICD-10-CM: N52.7 ICD-9-CM: 272.4  8/17/2012 Yes Patient admitted with acute hypoxic respiratory failure. Diagnosed with PNA but normal PCT, normal WBC. Completes abx today. Evidence of volume overload as well but BNP normal. May be multifactorial hypoxic respiratory failure. Exam clear this morning. Repeat CXR was ordered for this morning but not yet done. On IV lasix but no strict I/O for the last 24 hours. Has dukes in but no inputs recorded. Plan:  (Medical Decision Making)  
-complete abx today. -off steroids. No wheeze on exam.  
-get CXR already ordered for this morning.  
-cont to try and wean O2. Discussed with him that he may need O2 at discharge.  He tells me that as recently as 2 weeks ago his wound care nurse checked his sats on RA and they were 98. Hopefully that indicates this will be a short term O2 need. -cont aggressive diuresis, daily BMP's. -PT 
-ABG this AM on APAP was good. APAP for now. Will need outpatient sleep study after discharge.   
 
 
Oracio Feng MD

## 2019-01-17 NOTE — PROGRESS NOTES
Problem: Mobility Impaired (Adult and Pediatric) Goal: *Acute Goals and Plan of Care (Insert Text) STG: 
(1.)Mr. Antony Guevara will move from supine to sit and sit to supine  with MINIMAL ASSIST within 3 treatment day(s). (2.)Mr. Antony Guevara will transfer from bed to chair and chair to bed with MODERATE ASSIST using the least restrictive device within 3 treatment day(s). Met 1/14 LTG: 
(1.)Mr. Antony Guevara will move from supine to sit and sit to supine  in bed with STAND BY ASSIST within 5-7 treatment day(s). (2.)Mr. Antony Guevara will transfer from bed to chair and chair to bed with CONTACT GUARD ASSIST using the least restrictive device within 5-7 treatment day(s). (3.)Mr. Antony Guevara will ambulate with MIN TO MODERATE ASSIST for 15 feet with the least restrictive device within 5-7  treatment day(s). ________________________________________________________________________________________________ PHYSICAL THERAPY: Daily Note, Treatment Day: 5th, AM 1/17/2019INPATIENT: Hospital Day: 8 Payor: [employer]vin[employer] Mar / Plan: [address] / Product Type: [employer] Medicare /  
  
NAME/AGE/GENDER: Rory Valadez is a 77 y.o. male PRIMARY DIAGNOSIS: PNA (pneumonia) [J18.9] Hypoxia [R09.02] Hyperkalemia [E87.5] Respiratory failure with hypercapnia (HCC) [J96.92] PNA (pneumonia) PNA (pneumonia) ICD-10: Treatment Diagnosis:  
 · Generalized Muscle Weakness (M62.81) · Other abnormalities of gait and mobility (R26.89) Precaution/Allergies: 
Patient has no known allergies. ASSESSMENT:  
Mr. Antony Guevara is sitting in the chair upon arrival.  He just got of [facility], so he is a little SOB even on 3L. He agree to do exercises in the chair. He remain in the chair with call light near. This section established at most recent assessment PROBLEM LIST (Impairments causing functional limitations): 1. Decreased Strength 2. Decreased ADL/Functional Activities 3. Decreased Transfer Abilities 4. Decreased Ambulation Ability/Technique 5. Decreased Balance 6. Decreased Activity Tolerance 7. Increased Shortness of Breath 8. Decreased [place] with Home Exercise Program 
 INTERVENTIONS PLANNED: (Benefits and precautions of physical therapy have been discussed with the patient.) 1. Balance Exercise 2. Bed Mobility 3. Gait Training 4. Home Exercise Program (HEP) 5. Therapeutic Activites 6. Therapeutic Exercise/Strengthening 7. Transfer Training TREATMENT PLAN: Frequency/Duration: daily for duration of hospital stay Rehabilitation Potential For Stated Goals: Fair RECOMMENDED REHABILITATION/EQUIPMENT: (at time of discharge pending progress): Due to the probability of continued deficits (see above) this patient will likely need continued skilled physical therapy after discharge. Equipment:  
? Walkers, Type: Rolling Walker;   
    
 
 
 
HISTORY:  
History of Present Injury/Illness (Reason for Referral): 
Patient states he has a history of falls; sometimes he can walk and sometimes he can't. Patient is not a good historian. Family not present during evaluation. PER MD NOTES: 
 \"77year-old male history of coronary artery disease, ischemic cardipmyopathy, hypertension, factor V Leiden, CABG, AICD for nonsustained V. tach, phlebitis, DVT, paroxysmal atrial fibrillation, aortic stenosis presents from his primary care office for pneumonia. Pt does have chronic sob on exertion since cabg- several years ago. Since past 2 weeks- nasal congestion,increased sob-- no fever or headache or dizziness or chest pain. Went to pcp today for his INR check- daughter complained that pt was looking ill- wanted pcp to evaluate- found to be hypoxic - oxygen sat 83%- was given breathing treatment- was told that has pneumonia- sent to er for further evaluation. In er found to have oxygen saturation 89- cxr- cardiomegaly with infiltrate- started in rocephin and zithromax. Mild elevated d-dimer- ordered ct chest with contrast - pending. Pt will be admitted for acute on chronic sob-secondary to pneumonia. \" 
 
 
Past Medical History/Comorbidities:  
Mr. Gigi Cali  has a past medical history of AICD (automatic cardioverter/defibrillator) present (10/27/2015), Aortic stenosis, mild to moderate (10/27/2015), Cardiomyopathy, ischemic (8/17/2012), Carotid occlusion, bilateral (8/20/2012), Chronic back pain (2/12/2015), Chronic rhinitis (24/09/4264), Chronic systolic congestive heart failure ([facility] (10/27/2015), Coronary artery disease (8/17/2012), DVT, lower extremity, recurrent ([facility] Factor 5 Leiden mutation, heterozygous ([facility] (8/17/2012), Heart attack ([address] History of alcoholism ([facility] (quit age 34), History of complete eye exam (10/2016), smoking (quit after 19), Hyperlipidemia (8/17/2012), Hypertension (8/17/2012), NSVT (nonsustained ventricular tachycardia) ([facility] Obesity (8/17/2012), Osteoarthritis (2/12/2015), Pacemaker, Paroxysmal atrial fibrillation ([address] (10/27/2015), Phlebitis and thrombophlebitis (1995, 2004), S/P total knee arthroplasty (2/26/2015), Tobacco abuse (2/12/2015), and Wears dentures. Mr. Gigi Cali  has a past surgical history that includes hx cataract removal; hx lipoma resection (1998); hx angioplasty (1999); hx implantable cardioverter defibrillator (12/18/2011); hx coronary artery bypass graft (x 3, 12/2011); hx orthopaedic (Left); and hx lymph node dissection. Social History/Living Environment:  
Home Environment: Apartment # Steps to Enter: 14 
Rails to Enter: Yes One/Two Story Residence: Two story(lives on second floor) Living Alone: Yes Support Systems: Friends \ neighbors Patient Expects to be Discharged to[de-identified] Rehabilitation facility Current DME Used/Available at Home: [employer], bedsidehas a daughter; unclear about living situation. Prior Level of Function/Work/Activity: 
Patient says he was able to ambulate but has history of falls. Dominant Side: RIGHT Personal Factors:   
      Sex:  male Age:  77 y.o. Number of Personal Factors/Comorbidities that affect the Plan of Care: 1-2: MODERATE COMPLEXITY EXAMINATION:  
Most Recent Physical Functioning:  
Gross Assessment: 3-/5 throughout Balance: 
  Bed Mobility: 
  
 
  
Transfers: 
Duration: 0 Minutes Gait: pt a little SOB after he used the [facility] Body Structures Involved: 1. Nerves 2. Heart 3. Lungs 4. Joints 5. Muscles Body Functions Affected: 1. Sensory/Pain 2. Neuromusculoskeletal 
3. Movement Related Activities and Participation Affected: 1. General Tasks and Demands 2. Mobility 3. Self Care 4. Domestic Life 5. Community, Social and [place] Number of elements that affect the Plan of Care: 3: MODERATE COMPLEXITY CLINICAL PRESENTATION:  
Presentation: Evolving clinical presentation with changing clinical characteristics: MODERATE COMPLEXITY CLINICAL DECISION MAKING:  
[employer] AM-PAC 6 Clicks Basic Mobility Inpatient Short Form How much difficulty does the patient currently have. .. Unable A Lot A Little None 1. Turning over in bed (including adjusting bedclothes, sheets and blankets)? [] 1   [x] 2   [] 3   [] 4  
2. Sitting down on and standing up from a chair with arms ( e.g., wheelchair, bedside commode, etc.)   [] 1   [x] 2   [] 3   [] 4  
3. Moving from lying on back to sitting on the side of the bed? [] 1   [x] 2   [] 3   [] 4 How much help from another person does the patient currently need. .. Total A Lot A Little None 4. Moving to and from a bed to a chair (including a wheelchair)? [x] 1   [] 2   [] 3   [] 4  
5. Need to walk in hospital room? [x] 1   [] 2   [] 3   [] 4  
6. Climbing 3-5 steps with a railing? [x] 1   [] 2   [] 3   [] 4  
© 2007, Trustees of [facility] [employer], under license to [employer]. All rights reserved Score:  Initial: 9 Most Recent: X (Date: -- ) Interpretation of Tool:  Represents activities that are increasingly more difficult (i.e. Bed mobility, Transfers, Gait). Score 24 23 22-20 19-15 14-10 9-7 6 Modifier CH CI CJ CK CL CM CN   
 
? Mobility - Walking and Moving Around:  
  - CURRENT STATUS: CM - 80%-99% impaired, limited or restricted  - GOAL STATUS: CL - 60%-79% impaired, limited or restricted  - D/C STATUS:  ---------------To be determined--------------- Payor: [employer] MEDICARE / Plan: [address] / Product Type: [employer] Medicare /   
 
Medical Necessity:    
· Patient is expected to demonstrate progress in strength, balance and functional technique to increase independence with mobility and improve safety during mobility. Reason for Services/Other Comments: 
· Patient continues to demonstrate capacity to improve strength, mobility which will increase independence and increase safety. Use of outcome tool(s) and clinical judgement create a POC that gives a: Questionable prediction of patient's progress: MODERATE COMPLEXITY  
  
 
 
 
TREATMENT:  
(In addition to Assessment/Re-Assessment sessions the following treatments were rendered) Pre-treatment Symptoms/Complaints: I want to eat breakfast in the chair Pain: Initial: visual cues Pain Intensity 1: 3(about the same)  Post Session:   
 
Therapeutic Activity: (  0 Minutes ):  Therapeutic activities including repeated sit<>stand & stand pivot transfer with walker, Therapeutic Exercise: (15 Minutes):  Exercises per grid below to improve strength. Required minimal visual cues to promote proper body alignment. Progressed range as indicated. Date: 
1/13/19 Date: 
1/15 Date: 
1/16 Date: 
1/17 Activity/Exercise Parameters Parameters Parameters Ankle pumps 15 B 15  15 25 Quad sets 15 B  15 15    
glute sets 15 B 15 15 25 Hip ABD/ADD 15 B 15 15 25 Heel slides 15 B 15 15 LAQs 15 B 15 15 25 marching 15 B 15 15 25 Sit to stand  x5 Elbow flex/ext    25 Push/pull    25 All exercises are B Braces/Orthotics/Lines/Etc:  
· dukes catheter Treatment/Session Assessment:   
· Response to Treatment:  Continue to make slow and steady progress · Interdisciplinary Collaboration:  
o Registered Nurse · After treatment position/precautions:  
o Up in chair 
o Bed/Chair-wheels locked 
o Call light within reach 
o RN notified · Compliance with Program/Exercises: Will assess as treatment progresses · Recommendations/Intent for next treatment session: \"Next visit will focus on advancements to more challenging activities and reduction in assistance provided\". Total Treatment Duration: PT Patient Time In/Time Out Time In: 9312 Time Out: 1100 Meeta Pena, PTA

## 2019-01-17 NOTE — PROGRESS NOTES
01/17/19 1117 Oxygen Therapy O2 Sat (%) 97 % Pulse via Oximetry 84 beats per minute O2 Device Nasal cannula O2 Flow Rate (L/min) 3 l/min (o2 decreased to 2l NC no distress noted)

## 2019-01-18 LAB
ANION GAP SERPL CALC-SCNC: 2 MMOL/L
BUN SERPL-MCNC: 44 MG/DL (ref 8–23)
CALCIUM SERPL-MCNC: 7.8 MG/DL (ref 8.3–10.4)
CHLORIDE SERPL-SCNC: 105 MMOL/L (ref 98–107)
CO2 SERPL-SCNC: 32 MMOL/L (ref 21–32)
CREAT SERPL-MCNC: 1.13 MG/DL (ref 0.8–1.5)
GLUCOSE SERPL-MCNC: 113 MG/DL (ref 65–100)
INR PPP: 1.6
POTASSIUM SERPL-SCNC: 4.7 MMOL/L (ref 3.5–5.1)
PROTHROMBIN TIME: 19.6 SEC (ref 11.7–14.5)
SODIUM SERPL-SCNC: 139 MMOL/L (ref 136–145)

## 2019-01-18 PROCEDURE — 74011250637 HC RX REV CODE- 250/637: Performed by: INTERNAL MEDICINE

## 2019-01-18 PROCEDURE — 80048 BASIC METABOLIC PNL TOTAL CA: CPT

## 2019-01-18 PROCEDURE — 74011250637 HC RX REV CODE- 250/637: Performed by: FAMILY MEDICINE

## 2019-01-18 PROCEDURE — 94761 N-INVAS EAR/PLS OXIMETRY MLT: CPT

## 2019-01-18 PROCEDURE — 74011000250 HC RX REV CODE- 250: Performed by: INTERNAL MEDICINE

## 2019-01-18 PROCEDURE — 77010033678 HC OXYGEN DAILY

## 2019-01-18 PROCEDURE — 94640 AIRWAY INHALATION TREATMENT: CPT

## 2019-01-18 PROCEDURE — 74011250636 HC RX REV CODE- 250/636: Performed by: INTERNAL MEDICINE

## 2019-01-18 PROCEDURE — 99232 SBSQ HOSP IP/OBS MODERATE 35: CPT | Performed by: INTERNAL MEDICINE

## 2019-01-18 PROCEDURE — 97110 THERAPEUTIC EXERCISES: CPT

## 2019-01-18 PROCEDURE — 65270000029 HC RM PRIVATE

## 2019-01-18 PROCEDURE — 97535 SELF CARE MNGMENT TRAINING: CPT

## 2019-01-18 PROCEDURE — 94760 N-INVAS EAR/PLS OXIMETRY 1: CPT

## 2019-01-18 PROCEDURE — 85610 PROTHROMBIN TIME: CPT

## 2019-01-18 PROCEDURE — 97530 THERAPEUTIC ACTIVITIES: CPT

## 2019-01-18 PROCEDURE — 36415 COLL VENOUS BLD VENIPUNCTURE: CPT

## 2019-01-18 RX ADMIN — GUAIFENESIN 1200 MG: 600 TABLET, EXTENDED RELEASE ORAL at 22:06

## 2019-01-18 RX ADMIN — ENOXAPARIN SODIUM 40 MG: 40 INJECTION SUBCUTANEOUS at 22:07

## 2019-01-18 RX ADMIN — ASPIRIN 81 MG: 81 TABLET, COATED ORAL at 09:26

## 2019-01-18 RX ADMIN — CALCIUM CARBONATE 200 MG: 500 TABLET, CHEWABLE ORAL at 09:25

## 2019-01-18 RX ADMIN — Medication 10 ML: at 05:24

## 2019-01-18 RX ADMIN — CALCIUM CARBONATE 200 MG: 500 TABLET, CHEWABLE ORAL at 11:16

## 2019-01-18 RX ADMIN — CARVEDILOL 25 MG: 25 TABLET, FILM COATED ORAL at 17:23

## 2019-01-18 RX ADMIN — GUAIFENESIN 1200 MG: 600 TABLET, EXTENDED RELEASE ORAL at 09:25

## 2019-01-18 RX ADMIN — IPRATROPIUM BROMIDE AND ALBUTEROL SULFATE 3 ML: .5; 3 SOLUTION RESPIRATORY (INHALATION) at 07:46

## 2019-01-18 RX ADMIN — FUROSEMIDE 40 MG: 10 INJECTION, SOLUTION INTRAMUSCULAR; INTRAVENOUS at 09:27

## 2019-01-18 RX ADMIN — LORATADINE 10 MG: 10 TABLET ORAL at 17:23

## 2019-01-18 RX ADMIN — CALCIUM CARBONATE 200 MG: 500 TABLET, CHEWABLE ORAL at 17:23

## 2019-01-18 RX ADMIN — ENOXAPARIN SODIUM 40 MG: 40 INJECTION SUBCUTANEOUS at 11:16

## 2019-01-18 RX ADMIN — IPRATROPIUM BROMIDE AND ALBUTEROL SULFATE 3 ML: .5; 3 SOLUTION RESPIRATORY (INHALATION) at 21:42

## 2019-01-18 RX ADMIN — LISINOPRIL 20 MG: 20 TABLET ORAL at 09:26

## 2019-01-18 RX ADMIN — Medication 10 ML: at 22:12

## 2019-01-18 RX ADMIN — PRAVASTATIN SODIUM 40 MG: 20 TABLET ORAL at 22:07

## 2019-01-18 RX ADMIN — IPRATROPIUM BROMIDE AND ALBUTEROL SULFATE 3 ML: .5; 3 SOLUTION RESPIRATORY (INHALATION) at 11:28

## 2019-01-18 RX ADMIN — IPRATROPIUM BROMIDE AND ALBUTEROL SULFATE 3 ML: .5; 3 SOLUTION RESPIRATORY (INHALATION) at 16:33

## 2019-01-18 RX ADMIN — AMLODIPINE BESYLATE 10 MG: 10 TABLET ORAL at 09:26

## 2019-01-18 RX ADMIN — HYDROCODONE BITARTRATE AND ACETAMINOPHEN 1 TABLET: 10; 325 TABLET ORAL at 23:52

## 2019-01-18 RX ADMIN — CARVEDILOL 25 MG: 25 TABLET, FILM COATED ORAL at 09:25

## 2019-01-18 RX ADMIN — WARFARIN SODIUM 12 MG: 5 TABLET ORAL at 17:23

## 2019-01-18 RX ADMIN — HYDROCODONE BITARTRATE AND ACETAMINOPHEN 1 TABLET: 10; 325 TABLET ORAL at 00:28

## 2019-01-18 RX ADMIN — HYDROCODONE BITARTRATE AND ACETAMINOPHEN 1 TABLET: 10; 325 TABLET ORAL at 17:23

## 2019-01-18 RX ADMIN — BISACODYL 10 MG: 5 TABLET, COATED ORAL at 22:07

## 2019-01-18 RX ADMIN — HYDROCODONE BITARTRATE AND ACETAMINOPHEN 1 TABLET: 10; 325 TABLET ORAL at 09:26

## 2019-01-18 RX ADMIN — PANTOPRAZOLE SODIUM 40 MG: 40 TABLET, DELAYED RELEASE ORAL at 09:25

## 2019-01-18 RX ADMIN — DOCUSATE SODIUM 400 MG: 100 CAPSULE, LIQUID FILLED ORAL at 22:06

## 2019-01-18 NOTE — PROGRESS NOTES
Hospitalist Progress Note Admit Date:  1/10/2019  4:02 PM  
Name:  Radha Magana Age:  77 y.o. 
:  1940 MRN:  [id number] PCP:  Alice White MD 
Treatment Team: Attending Provider: Kelli Melgar MD; Consulting Provider: Bernie Carver MD; Utilization Review: Genesis Esparza RN; Hospitalist: Kelli Melgar MD 
 
Subjective:  
Patient admitted 1/10 for acute hypoxic respiratory failure and CAP. : Asleep in chair, comfortable, watching TV. Ambulated and did lots of work with PT today. Appetite is good. No chest pain or SOB. Didn't wear APAP last night, says his throat was too sore. ROS otherwise negative. Objective:  
 
Patient Vitals for the past 24 hrs: 
 Temp Pulse Resp BP SpO2  
19 1136 98 °F (36.7 °C) 69 18 115/68 93 % 19 1128     93 % 19 0746     96 % 19 0740 97.8 °F (36.6 °C) 71 18 143/77 95 % 19 0425 97.8 °F (36.6 °C) 72 18 103/66 95 % 19 0023 97.8 °F (36.6 °C) 68 20 109/68 96 % 19 2316 97.9 °F (36.6 °C) 68 18 96/55 96 % 19 2144  66  97/55   
19     98 % 19 1952 97.9 °F (36.6 °C) 71 18 (!) 87/50 99 % 19 1611 97.9 °F (36.6 °C) 70 18 99/60 95 % 19 1515     96 % 19 1301 98.2 °F (36.8 °C) 66 16 100/53 97 % Oxygen Therapy O2 Sat (%): 93 % (19 1136) Pulse via Oximetry: 76 beats per minute (19 1128) O2 Device: Nasal cannula (19 1128) O2 Flow Rate (L/min): 2 l/min (19 1128) O2 Temperature: (.) (01/15/19 2127) FIO2 (%): 32 % (19 1515) Intake/Output Summary (Last 24 hours) at 2019 1228 Last data filed at 2019 1000 Gross per 24 hour Intake  Output 550 ml Net -550 ml *Note that automatically entered I/Os may not be accurate; dependent on patient compliance with collection and accurate  by assistants. General:    Well nourished. Alert. Obese. CV: RRR. No murmur, rub, or gallop. Lungs:   CTAB. No wheezing, rhonchi, or rales. Abdomen:   Soft, nontender, nondistended. Extremities: Warm and dry. No cyanosis or edema. 2+ pitting edema b/l LEs. Skin:     No rashes or jaundice. Neuro:  No gross focal deficits Data Review: 
I have reviewed all labs, meds, telemetry events, and studies from the last 24 hours: 
 
Recent Results (from the past 24 hour(s)) PROTHROMBIN TIME + INR Collection Time: 19  5:58 AM  
Result Value Ref Range Prothrombin time 19.6 (H) 11.7 - 14.5 sec INR 1.6 METABOLIC PANEL, BASIC Collection Time: 19  5:58 AM  
Result Value Ref Range Sodium 139 136 - 145 mmol/L Potassium 4.7 3.5 - 5.1 mmol/L Chloride 105 98 - 107 mmol/L  
 CO2 32 21 - 32 mmol/L Anion gap 2 mmol/L Glucose 113 (H) 65 - 100 mg/dL BUN 44 (H) 8 - 23 MG/DL Creatinine 1.13 0.8 - 1.5 MG/DL  
 GFR est AA >60 >60 ml/min/1.73m2 GFR est non-AA >60 ml/min/1.73m2 Calcium 7.8 (L) 8.3 - 10.4 MG/DL All Micro Results None Results for orders placed or performed during the hospital encounter of 01/10/19  
2D ECHO COMPLETE ADULT (TTE) W OR WO CONTR Narrative [facility] [address] [place], [address] [facility] 
[phone] Transthoracic Echocardiogram 
2D, M-mode, Doppler, and Color Doppler Patient: Daniel Michelle 
MR #: [id number] : 1940 Age: 77 years Gender: Male Study date: 2019 Account #: [de-identified] Height: 72 in 
Weight: 278.5 lb 
BSA: 2.45 mï¾² Status:Routine Location: [facility] BP: 105/ 59 Allergies: NO KNOWN ALLERGENS Sonographer:  Cande Augustine CHARLEE Group:  [facility] Referring Physician:  Jerome Kimbrough MD 
Reading Physician:  Berna Nuno MD [facility] [place], [place] INDICATIONS: CHF PROCEDURE: This was a routine study. A transthoracic echocardiogram was performed. The study included complete 2D imaging, M-mode, complete spectral 
Doppler, and color Doppler. Intravenous contrast (Definity, 3 ml) was 
administered. Echocardiographic views were limited by poor acoustic window 
availability and poor body habitus. This was a technically difficult study. LEFT VENTRICLE: Size was at the upper limits of normal. Systolic function was 
mildly to moderately reduced. Ejection fraction was estimated in the range of 
40 % to 45 %. There was moderate hypokinesis of the basal inferoseptal  
wall(s). There was akinesis of the basal-mid inferior wall(s). There was moderate 
concentric hypertrophy. Left ventricular diastolic dysfunction Grade 2. Average E/e' of 26.7. RIGHT VENTRICLE: The ventricle was dilated. Systolic function was low normal. 
 A 
pacing wire was present. LEFT ATRIUM: The atrium was moderately to markedly dilated. RIGHT ATRIUM: Not well visualized. SYSTEMIC VEINS: IVC: The inferior vena cava was dilated. The respirophasic 
change in diameter was less than 50%. AORTIC VALVE: The valve was trileaflet. DI: 0.43. The aortic valve area by  
the 
continuity equation was 1.46 cm2. The peak velocity was 3.24 m/s. The mean 
pressure gradient was 19 mmHg. The peak pressure gradient was 42 mmHg. There 
was no insufficiency. MITRAL VALVE: Valve structure was normal. There was no evidence for stenosis. There was trivial regurgitation. TRICUSPID VALVE: The valve structure was normal. There was no evidence for 
stenosis. There was trivial regurgitation. PULMONIC VALVE: Not well visualized. PERICARDIUM: There was no pericardial effusion. AORTA: The root exhibited normal size. SUMMARY: 
 
-  Left ventricle: Size was at the upper limits of normal. Systolic function 
was mildly to moderately reduced. Ejection fraction was estimated in the  
range 
of 40 % to 45 %.  There was moderate hypokinesis of the basal inferoseptal 
 wall(s). There was akinesis of the basal-mid inferior wall(s). There was 
moderate concentric hypertrophy. 
 
-  Right ventricle: The ventricle was dilated. -  Left atrium: The atrium was moderately to markedly dilated. -  Inferior vena cava, hepatic veins: The inferior vena cava was dilated. The 
respirophasic change in diameter was less than 50%. -  Aortic valve: The aortic valve area by the continuity equation was 1.46  
cm2. SYSTEM MEASUREMENT TABLES 
 
2D mode Left Atrium Systolic Volume Index; Method of Disks, Biplane; 2D mode;: 55.9 
ml/m2 IVS/LVPW (2D): 0.9 IVSd (2D): 1.9 cm LVIDd (2D): 5.5 cm LVIDs (2D): 5 cm 
LVOT Area (2D): 4.5 cm2 LVPWd (2D): 2 cm RVIDd (2D): 3.7 cm Unspecified Scan Mode Peak Grad; Mean; Antegrade Flow: 38 mm[Hg] Vmax; Antegrade Flow: 292 cm/s LVOT Diam: 2.4 cm Prepared and signed by 
 
Rowena Wynn MD [facility] Signed 14-Jan-2019 16:10:12 Current Meds: 
Current Facility-Administered Medications Medication Dose Route Frequency  warfarin (COUMADIN) tablet 12 mg  12 mg Oral QPM  
 furosemide (LASIX) injection 40 mg  40 mg IntraVENous DAILY  albuterol-ipratropium (DUO-NEB) 2.5 MG-0.5 MG/3 ML  3 mL Nebulization QID RT  
 enoxaparin (LOVENOX) injection 40 mg  40 mg SubCUTAneous Q12H  pantoprazole (PROTONIX) tablet 40 mg  40 mg Oral ACB  calcium carbonate (TUMS) chewable tablet 200 mg [elemental]  200 mg Oral TID WITH MEALS  nystatin (MYCOSTATIN) 100,000 unit/gram powder   Topical BID  influenza vaccine 2018-19 (6 mos+)(PF) (FLUARIX QUAD/FLULAVAL QUAD) injection 0.5 mL  0.5 mL IntraMUSCular PRIOR TO DISCHARGE  sodium chloride (NS) flush 10 mL  10 mL InterCATHeter Q8H  
 bisacodyl (DULCOLAX) tablet 10 mg  10 mg Oral QHS  docusate sodium (COLACE) capsule 400 mg  400 mg Oral QHS  amLODIPine (NORVASC) tablet 10 mg  10 mg Oral DAILY  aspirin delayed-release tablet 81 mg  81 mg Oral DAILY  carvedilol (COREG) tablet 25 mg  25 mg Oral BID WITH MEALS  guaiFENesin ER (MUCINEX) tablet 1,200 mg  1,200 mg Oral BID  
 HYDROcodone-acetaminophen (NORCO)  mg tablet 1 Tab  1 Tab Oral Q6H PRN  
 lisinopril (PRINIVIL, ZESTRIL) tablet 20 mg  20 mg Oral DAILY  pravastatin (PRAVACHOL) tablet 40 mg  40 mg Oral QHS  sodium chloride (NS) flush 5-40 mL  5-40 mL IntraVENous PRN  
 acetaminophen (TYLENOL) tablet 650 mg  650 mg Oral Q4H PRN  
 morphine injection 1 mg  1 mg IntraVENous Q4H PRN  
 naloxone (NARCAN) injection 0.4 mg  0.4 mg IntraVENous PRN  
 ondansetron (ZOFRAN) injection 4 mg  4 mg IntraVENous Q4H PRN  
 hydrALAZINE (APRESOLINE) 20 mg/mL injection 10 mg  10 mg IntraVENous Q6H PRN  
 loratadine (CLARITIN) tablet 10 mg  10 mg Oral DAILY Other Studies (last 24 hours): Xr Chest Pa Lat Result Date: 1/17/2019 EXAM: XR CHEST PA LAT INDICATION: pneumonia COMPARISON: 1/12/2019. FINDINGS: PA and lateral radiographs of the chest demonstrate clear lungs. The cardiac and mediastinal contours and pulmonary vascularity are normal. The bones and soft tissues are within normal limits. IMPRESSION: The lungs are now clear. Assessment and Plan:  
 
Hospital Problems as of 1/18/2019 Date Reviewed: 10/2/2018 Codes Class Noted - Resolved POA Acute respiratory failure with hypoxia and hypercapnia (HCC) ICD-10-CM: J96.01, J96.02 
ICD-9-CM: 518.81  1/17/2019 - Present Unknown Obesity hypoventilation syndrome (HCC) ICD-10-CM: A31.6 ICD-9-CM: 278.03  1/14/2019 - Present Unknown [facility] ICD-10-CM: J44.1 ICD-9-CM: 491.21  1/11/2019 - Present Unknown Hyperkalemia ICD-10-CM: E87.5 ICD-9-CM: 276.7  1/10/2019 - Present Unknown * (Principal) PNA (pneumonia) ICD-10-CM: J18.9 ICD-9-CM: 429  1/10/2019 - Present Unknown Essential hypertension ICD-10-CM: I10 
ICD-9-CM: 401.9  2/6/2017 - Present Yes Chronic systolic congestive heart failure (HCC) ICD-10-CM: I50.22 ICD-9-CM: 428.22, 428.0  10/27/2015 - Present Yes AICD (automatic cardioverter/defibrillator) present ICD-10-CM: Z95.810 ICD-9-CM: V45.02  10/27/2015 - Present Yes Paroxysmal atrial fibrillation (HCC) ICD-10-CM: I48.0 ICD-9-CM: 427.31  10/27/2015 - Present Yes Aortic stenosis, mild to moderate ICD-10-CM: I35.0 ICD-9-CM: 424.1  10/27/2015 - Present Yes Osteoarthritis (Chronic) ICD-10-CM: M19.90 ICD-9-CM: 715.90  2/12/2015 - Present Overview Signed 10/27/2015  9:58 AM by Jairon Rosales With severe chronic low back pain. Chronic back pain (Chronic) ICD-10-CM: M54.9, G89.29 ICD-9-CM: 724.5, 338.29  2/12/2015 - Present Yes Obesity (Chronic) ICD-10-CM: O56.5 ICD-9-CM: 278.00  8/17/2012 - Present Yes Coronary artery disease (Chronic) ICD-10-CM: I25.10 ICD-9-CM: 414.00  8/17/2012 - Present Yes Overview Signed 8/17/2012  3:57 PM by Neomia Signs Diagnosed 1999, CABG 12/2011 Cardiomyopathy, ischemic (Chronic) ICD-10-CM: I25.5 ICD-9-CM: 414.8  8/17/2012 - Present Yes Overview Signed 8/17/2012  3:58 PM by Neomia Signs Pacemaker AICD 12/2011,  35% LUEF Factor 5 Leiden mutation, heterozygous ([facility] (Chronic) ICD-10-CM: C72.35 
ICD-9-CM: 289.81  8/17/2012 - Present Yes Overview Signed 8/17/2012  4:02 PM by Neomia Signs Chronic anticoag., hx DVT Hyperlipidemia (Chronic) ICD-10-CM: D86.3 ICD-9-CM: 272.4  8/17/2012 - Present Yes Plan: # Acute hypoxic/hypercapnic respiratory failure 
           - 2/2 CAP with superimposed, untreated JACOB/OHS 
           - Last dose abx 1/17 
           - Transferred to floor 1/16. CPAP/APAP qhs 
           - Appreciate pulm.  
  
# CAP 
            - ceftriaxone/azithro, last dose 1/17 
  
# COPD 
            - steroids dc, nebs 
  
# Chronic dCHF 
            - euvolemic; home meds             - TTE unchanged from 2017 
  
# HTN 
            - home meds 
  
# H/o DVT/Factor V 
            - warfarin DC planning/Dispo: Insurance approval pending for rehab. Con't current plan of care. Diet:  DIET CARDIAC 
DVT ppx:  warfarin Signed: 
Ramona Pablo MD

## 2019-01-18 NOTE — PROGRESS NOTES
Problem: Mobility Impaired (Adult and Pediatric) Goal: *Acute Goals and Plan of Care (Insert Text) STG: 
(1.)Mr. Shayla Alston will move from supine to sit and sit to supine  with MINIMAL ASSIST within 3 treatment day(s). (2.)Mr. Shayla Alston will transfer from bed to chair and chair to bed with MODERATE ASSIST using the least restrictive device within 3 treatment day(s). Met 1/14 LTG: 
(1.)Mr. Shayla Alston will move from supine to sit and sit to supine  in bed with STAND BY ASSIST within 5-7 treatment day(s). (2.)Mr. Shayla Alston will transfer from bed to chair and chair to bed with CONTACT GUARD ASSIST using the least restrictive device within 5-7 treatment day(s). (3.)Mr. Shayla Alston will ambulate with MIN TO MODERATE ASSIST for 15 feet with the least restrictive device within 5-7  treatment day(s). ________________________________________________________________________________________________ PHYSICAL THERAPY: Daily Note, Treatment Day: 6th, AM 1/18/2019INPATIENT: Hospital Day: 9 Payor: Otto Shoemaker / Plan: [employer] / Product Type: [employer] Medicare /  
  
NAME/AGE/GENDER: Elayne Rahman is a 77 y.o. male PRIMARY DIAGNOSIS: PNA (pneumonia) [J18.9] Hypoxia [R09.02] Hyperkalemia [E87.5] Respiratory failure with hypercapnia (HCC) [J96.92] PNA (pneumonia) PNA (pneumonia) ICD-10: Treatment Diagnosis:  
 · Generalized Muscle Weakness (M62.81) · Other abnormalities of gait and mobility (R26.89) Precaution/Allergies: 
Patient has no known allergies. ASSESSMENT:  
Mr. Shayla Alston is supine upon arrival.  He work on bed mobility as follows:  Supine>EOB>stand with Min A. He walk 30 ft plus around in the room with 2 L of 02 @ all times. He return to chair and performs exercises with rest breaks. He was left in the chair with call light near. This section established at most recent assessment PROBLEM LIST (Impairments causing functional limitations): 1. Decreased Strength 2. Decreased ADL/Functional Activities 3. Decreased Transfer Abilities 4. Decreased Ambulation Ability/Technique 5. Decreased Balance 6. Decreased Activity Tolerance 7. Increased Shortness of Breath 8. Decreased Roberts with Home Exercise Program 
 INTERVENTIONS PLANNED: (Benefits and precautions of physical therapy have been discussed with the patient.) 1. Balance Exercise 2. Bed Mobility 3. Gait Training 4. Home Exercise Program (HEP) 5. Therapeutic Activites 6. Therapeutic Exercise/Strengthening 7. Transfer Training TREATMENT PLAN: Frequency/Duration: daily for duration of hospital stay Rehabilitation Potential For Stated Goals: Fair RECOMMENDED REHABILITATION/EQUIPMENT: (at time of discharge pending progress): Due to the probability of continued deficits (see above) this patient will likely need continued skilled physical therapy after discharge. Equipment:  
? Walkers, Type: Rolling Walker;   
    
 
 
 
HISTORY:  
History of Present Injury/Illness (Reason for Referral): 
Patient states he has a history of falls; sometimes he can walk and sometimes he can't. Patient is not a good historian. Family not present during evaluation. PER MD NOTES: 
 \"77year-old male history of coronary artery disease, ischemic cardipmyopathy, hypertension, factor V Leiden, CABG, AICD for nonsustained V. tach, phlebitis, DVT, paroxysmal atrial fibrillation, aortic stenosis presents from his primary care office for pneumonia. Pt does have chronic sob on exertion since cabg- several years ago. Since past 2 weeks- nasal congestion,increased sob-- no fever or headache or dizziness or chest pain.  
Went to pcp today for his INR check- daughter complained that pt was looking ill- wanted pcp to evaluate- found to be hypoxic - oxygen sat 83%- was given breathing treatment- was told that has pneumonia- sent to er for further evaluation. In er found to have oxygen saturation 89- cxr- cardiomegaly with infiltrate- started in rocephin and zithromax. Mild elevated d-dimer- ordered ct chest with contrast - pending. Pt will be admitted for acute on chronic sob-secondary to pneumonia. \" 
 
 
Past Medical History/Comorbidities:  
Mr. Jessica Stafford  has a past medical history of AICD (automatic cardioverter/defibrillator) present (10/27/2015), Aortic stenosis, mild to moderate (10/27/2015), Cardiomyopathy, ischemic (8/17/2012), Carotid occlusion, bilateral (8/20/2012), Chronic back pain (2/12/2015), Chronic rhinitis (54/15/0409), Chronic systolic congestive heart failure ([facility] [address] (10/27/2015), Coronary artery disease (8/17/2012), DVT, lower extremity, recurrent ([facility] [address] Factor 5 Leiden mutation, heterozygous ([facility] [address] (8/17/2012), Heart attack ([address] History of alcoholism ([facility] [address] (quit age 34), History of complete eye exam (10/2016), smoking (quit after 19), Hyperlipidemia (8/17/2012), Hypertension (8/17/2012), NSVT (nonsustained ventricular tachycardia) ([address] Obesity (8/17/2012), Osteoarthritis (2/12/2015), Pacemaker, Paroxysmal atrial fibrillation ([facility] [address] (10/27/2015), Phlebitis and thrombophlebitis (1995, 2004), S/P total knee arthroplasty (2/26/2015), Tobacco abuse (2/12/2015), and Wears dentures. Mr. Jessica Stafford  has a past surgical history that includes hx cataract removal; hx lipoma resection (1998); hx angioplasty (1999); hx implantable cardioverter defibrillator (12/18/2011); hx coronary artery bypass graft (x 3, 12/2011); hx orthopaedic (Left); and hx lymph node dissection. Social History/Living Environment:  
Home Environment: Apartment # Steps to Enter: 14 
Rails to Enter: Yes One/Two Story Residence: Two story(lives on second floor) Living Alone: Yes Support Systems: Friends \ neighbors Patient Expects to be Discharged to[de-identified] Rehabilitation facility Current DME Used/Available at Home: Com[employer], bedsidehas a daughter; unclear about living situation. Prior Level of Function/Work/Activity: 
Patient says he was able to ambulate but has history of falls. Dominant Side:  
      RIGHT Personal Factors:   
      Sex:  male Age:  77 y.o. Number of Personal Factors/Comorbidities that affect the Plan of Care: 1-2: MODERATE COMPLEXITY EXAMINATION:  
Most Recent Physical Functioning:  
Gross Assessment: 3-/5 throughout Balance: 
  Bed Mobility: 
Rolling: Contact guard assistance Supine to Sit: Minimum assistance Sit to Supine: (left up in chair) Transfers: 
Sit to Stand: Minimum assistance Stand to Sit: Minimum assistance Bed to Chair: Minimum assistance Duration: 23 Minutes Gait: pt a little SOB after he used the [facility] Speed/Blanca: Shuffled; Slow Step Length: Left shortened;Right shortened Gait Abnormalities: Decreased step clearance Distance (ft): 30 Feet (ft)(plus ft around the room with 2 L of 02) Assistive Device: Walker, rolling Ambulation - Level of Assistance: Minimal assistance Body Structures Involved: 1. Nerves 2. Heart 3. Lungs 4. Joints 5. Muscles Body Functions Affected: 1. Sensory/Pain 2. Neuromusculoskeletal 
3. Movement Related Activities and Participation Affected: 1. General Tasks and Demands 2. Mobility 3. Self Care 4. Domestic Life 5. Community, Social and [place] Number of elements that affect the Plan of Care: 3: MODERATE COMPLEXITY CLINICAL PRESENTATION:  
Presentation: Evolving clinical presentation with changing clinical characteristics: MODERATE COMPLEXITY CLINICAL DECISION MAKING:  
[employer] -PAC 6 Clicks Basic Mobility Inpatient Short Form How much difficulty does the patient currently have. .. Unable A Lot A Little None 1. Turning over in bed (including adjusting bedclothes, sheets and blankets)? [] 1   [x] 2   [] 3   [] 4  
2.   Sitting down on and standing up from a chair with arms ( e.g., wheelchair, bedside commode, etc.)   [] 1   [x] 2   [] 3   [] 4  
3. Moving from lying on back to sitting on the side of the bed? [] 1   [x] 2   [] 3   [] 4 How much help from another person does the patient currently need. .. Total A Lot A Little None 4. Moving to and from a bed to a chair (including a wheelchair)? [x] 1   [] 2   [] 3   [] 4  
5. Need to walk in hospital room? [x] 1   [] 2   [] 3   [] 4  
6. Climbing 3-5 steps with a railing? [x] 1   [] 2   [] 3   [] 4  
© 2007, Trustees of [address] [place] [address] [address] [id number], under license to [employer]. All rights reserved Score:  Initial: 9 Most Recent: X (Date: -- ) Interpretation of Tool:  Represents activities that are increasingly more difficult (i.e. Bed mobility, Transfers, Gait). Score 24 23 22-20 19-15 14-10 9-7 6 Modifier CH CI CJ CK CL CM CN   
 
? Mobility - Walking and Moving Around:  
  - CURRENT STATUS: CM - 80%-99% impaired, limited or restricted  - GOAL STATUS: CL - 60%-79% impaired, limited or restricted  - D/C STATUS:  ---------------To be determined--------------- Payor: [facility] MEDICARE / Plan: [address] / Product Type: Managed Care Medicare /   
 
Medical Necessity:    
· Patient is expected to demonstrate progress in strength, balance and functional technique to increase independence with mobility and improve safety during mobility. Reason for Services/Other Comments: 
· Patient continues to demonstrate capacity to improve strength, mobility which will increase independence and increase safety. Use of outcome tool(s) and clinical judgement create a POC that gives a: Questionable prediction of patient's progress: MODERATE COMPLEXITY  
  
 
 
 
TREATMENT:  
(In addition to Assessment/Re-Assessment sessions the following treatments were rendered) Pre-treatment Symptoms/Complaints: doing well this morning Pain: Initial: visual cues Pain Intensity 1: 4(same after therapy)  Post Session:   
 
Therapeutic Activity: (  23 Minutes ):  Therapeutic activities including repeated sit<>stand & stand pivot transfer with walker, Therapeutic Exercise: (15 Minutes):  Exercises per grid below to improve strength. Required minimal visual cues to promote proper body alignment. Progressed range as indicated. Date: 
1/13/19 Date: 
1/15 Date: 
1/16 Date: 
1/17 Date: Activity/Exercise Parameters Parameters Parameters Ankle pumps 15 B 15  15 25 30 Quad sets 15 B  15 15    
glute sets 15 B 15 15 25 30 Hip ABD/ADD 15 B 15 15 25 30 Heel slides 15 B 15 15 LAQs 15 B 15 15 25 30  
marching 15 B 15 15 25 30 Sit to stand  x5 Elbow flex/ext    25 30 Push/pull    25 30 All exercises are B Braces/Orthotics/Lines/Etc:  
· dukes catheter Treatment/Session Assessment:   
· Response to Treatment:  Continue to make progress with gait and exercises. · Interdisciplinary Collaboration:  
o Registered Nurse · After treatment position/precautions:  
o Up in chair 
o Bed/Chair-wheels locked 
o Call light within reach 
o RN notified · Compliance with Program/Exercises: Will assess as treatment progresses · Recommendations/Intent for next treatment session: \"Next visit will focus on advancements to more challenging activities and reduction in assistance provided\". Total Treatment Duration: PT Patient Time In/Time Out Time In: 3004 Time Out: 1123 Meeta Pena, PTA

## 2019-01-18 NOTE — PROGRESS NOTES
Oxygen Qualifier Room air: SpO2 with O2 and liter flow Resting SpO2  97% Ambulating SpO2  92% Resting SAT post ambulation on Ra=97% Completed by: 
 
Judi Rogers, RT

## 2019-01-18 NOTE — PROGRESS NOTES
Warfarin dosing per pharmacist 
  
Radha Magana is a 77 y.o. male. 
  
  
Indication:  atrial fibrillation 
  
Goal INR:  2 - 3 
  
Home dose:   5 - 10 mg? 
  
Risk factors or significant drug interactions:  macrolide antibiotics 
  
Other anticoagulants:  Enoxaparin 40mg q12h 
  
         
                 
 
Daily Monitoring Date                 INR          Warfarin dose         HGB              Notes 1/10                 1.6                   5 mg                15.2 
1/11                 1.5                   5mg                 14.5  
1/12                 1.2                   5mg                 14 
1/13                 1.2                   5mg 1/14                 1.2                 7.5mg   
1/15                 1.4                 7.5mg                13.6 1/16                 1.5                 10mg 1/17                 1.4                 12mg 1/18                 1.6                  12mg 
         
 We will continue the dose of warfarin 12mg this evening. We will continue to monitor a daily INR and will adjust the dose as necessary per guidelines. Thank you, Marielos Gusman, PharmD

## 2019-01-18 NOTE — PROGRESS NOTES
Problem: Self Care Deficits Care Plan (Adult) Goal: *Acute Goals and Plan of Care (Insert Text) 1. Patient will perform grooming with stand by assistance seated edge of bed. 2. Patient will perform Upper body dressing with minimal assistance seated edge of bed. 3. Patient will perform upper body bathing with minimal assistance seated edge of bed. 4. Patient will participate in 30 + minutes of ADL/ therapeutic exercise/therapeutic activity with min rest breaks to increase activity tolerance for self care. PROGRESSING Goals to be achieved in 7 days. Mobility goals to be set as mobility is further assessed. OCCUPATIONAL THERAPY: Daily Note, Treatment Day: 2nd and PM 1/18/2019INPATIENT: Hospital Day: 9 Payor: Gerald Delacruz / Plan: [address] / Product Type: [employer] Medicare /  
  
NAME/AGE/GENDER: Matthew Cat is a 77 y.o. male PRIMARY DIAGNOSIS:  PNA (pneumonia) [J18.9] Hypoxia [R09.02] Hyperkalemia [E87.5] Respiratory failure with hypercapnia (HCC) [J96.92] PNA (pneumonia) PNA (pneumonia) ICD-10: Treatment Diagnosis:  
 
 · Generalized Muscle Weakness (M62.81) Precautions/Allergies: 
   Patient has no known allergies. ASSESSMENT:  
Mr. Sarah Atwood presents with above diagnosis and was noted to have significant generalized weakness. Pt noted he fell getting in/out tub shower combo 6 months ago and suffered a wound to his leg that required home health care and the wound center to get it healed. In that time his became sedentary and with decreased ability to move and do for himself. There was a woman in the room that stated she was his daughter and lived in the apartment below him. .. Unsure of this authenticity of this relationship. She does seem to care for him and says she is willing to help. He will, however, need short term rehab post discharge due to significant debility.  Pt will benefit from OT while in the hospital to start the process of increased independence and safety with basic self care. 1/16/19 1305 patient sitting up in recliner most of the day and worked with PT. He declined bathing off but agreed to B UE therapeutic exercise. He was living alone prior to admit and was mod I having home health nursing addressing his Left leg wound. He plans for further therapy at a rehab then to be discharged to his \"daughters home\" that is accessible. He is agreeable and motivated. He is moving out of ICU to the floor per nursing. Will continue with OT poc 
 
1/18/19 1430 patient sitting up in recliner. He walked with PT earlier today down the martin. He stood and walked to the bathroom sat on BSC frame on the toilet and then stood and returned back to recliner all with SBA using RW. He was on room air he is making good progress. Will continue with POC. Indigo Arreola This section established at most recent assessment PROBLEM LIST (Impairments causing functional limitations): 1. Decreased Strength 2. Decreased ADL/Functional Activities 3. Decreased Transfer Abilities 4. Decreased Ambulation Ability/Technique 5. Decreased Balance 6. Decreased Activity Tolerance INTERVENTIONS PLANNED: (Benefits and precautions of occupational therapy have been discussed with the patient.) 1. Activities of daily living training 2. Adaptive equipment training 3. Balance training 4. Therapeutic activity 5. Therapeutic exercise TREATMENT PLAN: Frequency/Duration: Follow patient 3 times per week to address above goals. Rehabilitation Potential For Stated Goals: Good RECOMMENDED REHABILITATION/EQUIPMENT: (at time of discharge pending progress): Due to the probability of continued deficits (see above) this patient will likely need continued skilled occupational therapy after discharge. Equipment:  
? None at this time OCCUPATIONAL PROFILE AND HISTORY:  
History of Present Injury/Illness (Reason for Referral): 
weakness Past Medical History/Comorbidities:  
Mr. Sarah Atwood  has a past medical history of AICD (automatic cardioverter/defibrillator) present (10/27/2015), Aortic stenosis, mild to moderate (10/27/2015), Cardiomyopathy, ischemic (8/17/2012), Carotid occlusion, bilateral (8/20/2012), Chronic back pain (2/12/2015), Chronic rhinitis (95/28/1555), Chronic systolic congestive heart failure ([facility] [address] (10/27/2015), Coronary artery disease (8/17/2012), DVT, lower extremity, recurrent ([facility] [address] Factor 5 Leiden mutation, heterozygous ([facility] [address] (8/17/2012), Heart attack ([facility] [address] History of alcoholism ([facility] [address] (quit age 34), History of complete eye exam (10/2016), smoking (quit after 19), Hyperlipidemia (8/17/2012), Hypertension (8/17/2012), NSVT (nonsustained ventricular tachycardia) ([facility] [address] Obesity (8/17/2012), Osteoarthritis (2/12/2015), Pacemaker, Paroxysmal atrial fibrillation ([facility] [address] (10/27/2015), Phlebitis and thrombophlebitis (1995, 2004), S/P total knee arthroplasty (2/26/2015), Tobacco abuse (2/12/2015), and Wears dentures. Mr. Sarah Atwood  has a past surgical history that includes hx cataract removal; hx lipoma resection (1998); hx angioplasty (1999); hx implantable cardioverter defibrillator (12/18/2011); hx coronary artery bypass graft (x 3, 12/2011); hx orthopaedic (Left); and hx lymph node dissection. Social History/Living Environment:  
Home Environment: Apartment # Steps to Enter: 14 
Rails to Enter: Yes One/Two Story Residence: Two story(lives on second floor) Living Alone: Yes Support Systems: Friends \ neighbors Patient Expects to be Discharged to[de-identified] Rehabilitation facility Current DME Used/Available at Home: Commode, bedside Prior Level of Function/Work/Activity: 
Independent prior to a fall 6 month ago. Number of Personal Factors/Comorbidities that affect the Plan of Care: Expanded review of therapy/medical records (1-2):  MODERATE COMPLEXITY ASSESSMENT OF OCCUPATIONAL PERFORMANCE[de-identified]  
Activities of Daily Living: Basic ADLs (From Assessment) Complex ADLs (From Assessment) Feeding: Independent Oral Facial Hygiene/Grooming: Minimum assistance Bathing: Maximum assistance Upper Body Dressing: Moderate assistance Lower Body Dressing: Total assistance Toileting: Total assistance(bed pan, urinal) Grooming/Bathing/Dressing Activities of Daily Living Cognitive Retraining Safety/Judgement: Awareness of environment; Fall prevention Toileting Toileting Assistance: Stand-by assistance Functional Transfers Bathroom Mobility: Stand-by assistance Toilet Transfer : Stand-by assistance Adaptive Equipment: Other (comment)([facility] frame over toilet) Bed/Mat Mobility Rolling: Contact guard assistance Supine to Sit: Minimum assistance Sit to Supine: (left up in chair) Sit to Stand: Stand-by assistance Bed to Chair: Minimum assistance Most Recent Physical Functioning:  
Gross Assessment: 
  
         
  
Posture: 
Posture (WDL): Exceptions to [facility] Posture Assessment: Forward head, Rounded shoulders Balance: 
Sitting: Intact Standing: Without support Standing - Static: Good Standing - Dynamic : Fair Bed Mobility: 
Rolling: Contact guard assistance Supine to Sit: Minimum assistance Sit to Supine: (left up in chair) Wheelchair Mobility: 
  
Transfers: 
Sit to Stand: Stand-by assistance Stand to Sit: Minimum assistance Bed to Chair: Minimum assistance Duration: 23 Minutes Patient Vitals for the past 6 hrs: 
 BP BP Patient Position SpO2 O2 Flow Rate (L/min) Pulse 01/18/19 1128   93 % 2 l/min   
01/18/19 1136 115/68 At rest;Supine 93 %  69  
01/18/19 1333   97 % 1 l/min  Mental Status Neurologic State: Alert, Appropriate for age Orientation Level: Appropriate for age Cognition: Appropriate decision making, Appropriate for age attention/concentration, Appropriate safety awareness, Follows commands Perception: Appears intact Perseveration: No perseveration noted Safety/Judgement: Awareness of environment, Fall prevention Physical Skills Involved: 
1. Balance 2. Strength 3. Activity Tolerance Cognitive Skills Affected (resulting in the inability to perform in a timely and safe manner): 1. maikel Psychosocial Skills Affected: 1. Environmental Adaptation Number of elements that affect the Plan of Care: 3-5:  MODERATE COMPLEXITY CLINICAL DECISION MAKIN[address] [place] [address] AM-PAC 6 Clicks Daily Activity Inpatient Short Form How much help from another person does the patient currently need. .. Total A Lot A Little None 1. Putting on and taking off regular lower body clothing? [x] 1   [] 2   [] 3   [] 4  
2. Bathing (including washing, rinsing, drying)? [] 1   [x] 2   [] 3   [] 4  
3. Toileting, which includes using toilet, bedpan or urinal?   [] 1   [x] 2   [] 3   [] 4  
4. Putting on and taking off regular upper body clothing? [] 1   [x] 2   [] 3   [] 4  
5. Taking care of personal grooming such as brushing teeth? [] 1   [] 2   [x] 3   [] 4  
6. Eating meals? [] 1   [] 2   [] 3   [x] 4  
© , Trustees of [address] [place] [address], under license to [employer]. All rights reserved Score:  Initial: 14 Most Recent: X (Date: -- ) Interpretation of Tool:  Represents activities that are increasingly more difficult (i.e. Bed mobility, Transfers, Gait). Score 24 23 22-20 19-15 14-10 9-7 6 Modifier CH CI CJ CK CL CM CN   
 
? Self Care:  
  - CURRENT STATUS: CL - 60%-79% impaired, limited or restricted  - GOAL STATUS: CK - 40%-59% impaired, limited or restricted  - D/C STATUS:  ---------------To be determined--------------- Payor: [employer] MEDICARE / Plan: [address] / Product Type: Managed Care Medicare /   
 
Medical Necessity:    
· Patient is expected to demonstrate progress in strength, balance, coordination and functional technique to increase independence with self care. Reason for Services/Other Comments: 
· Patient would benefit from OT to maximize safety and independence with self care and functional mobility . Use of outcome tool(s) and clinical judgement create a POC that gives a: MODERATE COMPLEXITY  
 
 
 
TREATMENT:  
(In addition to Assessment/Re-Assessment sessions the following treatments were rendered) Pre-treatment Symptoms/Complaints:   
Pain: Initial:  
Pain Intensity 1: 0 0 Post Session:  0 Self Care: (15): Procedure(s) (per grid) utilized to improve and/or restore self-care/home management as related to toileting. Required minimal visual and verbal cueing to facilitate activities of daily living skills and compensatory activities. Date: 
1/16/19 Date: 
 Date: Activity/Exercise Parameters Parameters Parameters Single UE shoulder flexion R then L 10 reps B forward punches  15 reps Gross grasps flex/ext 15 reps Shoulder shruggs 15 reps Braces/Orthotics/Lines/Etc:  
· room air Treatment/Session Assessment:   
· Response to Treatment:  Pt with good understanding of why he is in hospital and the therapy required to get back to his baseline motivated to get stronger to move into his daughters home in February 2019 · Interdisciplinary Collaboration:  
o Occupational Therapist 
o Registered Nurse · After treatment position/precautions:  
o Up in chair 
o Bed/Chair-wheels locked 
o Call light within reach 
o RN notified · Compliance with Program/Exercises: Compliant all of the time, Will assess as treatment progresses. · Recommendations/Intent for next treatment session: \"Next visit will focus on reduction in assistance provided\". Total Treatment Duration:15 
OT Patient Time In/Time Out Time In: 1430 Time Out: 3955 Fahad Brito OT

## 2019-01-18 NOTE — PROGRESS NOTES
Am assessment completed. Pt is alert and oriented x 4, lungs diminished with coarse BS wheezing. Verbalizes needs well. Takes medications whole with thin liquids. Up with 1P assist and walker. Complaints of pain - will give norco. Continue to monitor.

## 2019-01-18 NOTE — PROGRESS NOTES
No [facility] Admission Date: 1/10/2019 Daily Progress Note: 1/18/2019 The patient's chart is reviewed and the patient is discussed with the staff. 
 
78 y.o.  male  evaluated at the request of Dr. Narinder John. Pt has a history of coronary artery disease, ischemic cardipmyopathy, hypertension, factor V Leiden, CABG, AICD for nonsustained V. tach, phlebitis, DVT, paroxysmal atrial fibrillation, aortic stenosis presents from his primary care office for pneumonia.  
Pt does have chronic sob on exertion since cabg- several years ago. Since past 2 weeks- nasal congestion,increased sob-- no fever or headache or dizziness or chest pain. Went to pcp1/10 for his INR check- daughter complained that pt was looking ill- wanted pcp to evaluate- found to be hypoxic - oxygen sat 83%- was given breathing treatment- was told that has pneumonia- sent to er for further evaluation. Subjective:  
 
States breathing is fine. O2 weaned to \[facility] [place] [facility]\"" - [facility]. Minimal cough. CXR reportedly clear yesterday. No new complaints. Did not want to wear APAP last night due to sore throat, but is not against it chronically. Current Facility-Administered Medications Medication Dose Route Frequency  warfarin (COUMADIN) tablet 12 mg  12 mg Oral QPM  
 furosemide (LASIX) injection 40 mg  40 mg IntraVENous DAILY  albuterol-ipratropium (DUO-NEB) 2.5 MG-0.5 MG/3 ML  3 mL Nebulization QID RT  
 enoxaparin (LOVENOX) injection 40 mg  40 mg SubCUTAneous Q12H  pantoprazole (PROTONIX) tablet 40 mg  40 mg Oral ACB  calcium carbonate (TUMS) chewable tablet 200 mg [elemental]  200 mg Oral TID WITH MEALS  nystatin (MYCOSTATIN) 100,000 unit/gram powder   Topical BID  influenza vaccine 2018-19 (6 mos+)(PF) (FLUARIX QUAD/FLULAVAL QUAD) injection 0.5 mL  0.5 mL IntraMUSCular PRIOR TO DISCHARGE  sodium chloride (NS) flush 10 mL  10 mL InterCATHeter Q8H  
  bisacodyl (DULCOLAX) tablet 10 mg  10 mg Oral QHS  docusate sodium (COLACE) capsule 400 mg  400 mg Oral QHS  amLODIPine (NORVASC) tablet 10 mg  10 mg Oral DAILY  aspirin delayed-release tablet 81 mg  81 mg Oral DAILY  carvedilol (COREG) tablet 25 mg  25 mg Oral BID WITH MEALS  guaiFENesin ER (MUCINEX) tablet 1,200 mg  1,200 mg Oral BID  
 HYDROcodone-acetaminophen (NORCO)  mg tablet 1 Tab  1 Tab Oral Q6H PRN  
 lisinopril (PRINIVIL, ZESTRIL) tablet 20 mg  20 mg Oral DAILY  pravastatin (PRAVACHOL) tablet 40 mg  40 mg Oral QHS  sodium chloride (NS) flush 5-40 mL  5-40 mL IntraVENous PRN  
 acetaminophen (TYLENOL) tablet 650 mg  650 mg Oral Q4H PRN  
 morphine injection 1 mg  1 mg IntraVENous Q4H PRN  
 naloxone (NARCAN) injection 0.4 mg  0.4 mg IntraVENous PRN  
 ondansetron (ZOFRAN) injection 4 mg  4 mg IntraVENous Q4H PRN  
 hydrALAZINE (APRESOLINE) 20 mg/mL injection 10 mg  10 mg IntraVENous Q6H PRN  
 loratadine (CLARITIN) tablet 10 mg  10 mg Oral DAILY Review of Systems Constitutional: negative for fever, chills, sweats Cardiovascular: + LE edema. negative for chest pain, palpitations, syncope Gastrointestinal: negative for dysphagia, reflux, vomiting, diarrhea, abdominal pain, or melena Neurologic: negative for focal weakness, numbness, headache Objective:  
 
Vitals:  
 01/18/19 9807 01/18/19 0425 01/18/19 0740 01/18/19 3257 BP:  103/66 143/77 Pulse:  72 71 Resp:  18 18 Temp:  97.8 °F (36.6 °C) 97.8 °F (36.6 °C) SpO2:  95% 95% 96% Weight: 270 lb 1 oz (122.5 kg) Height:      
 
Intake and Output:  
01/16 1901 - 01/18 0700 In: -  
Out: 2700 [Urine:2700] No intake/output data recorded. Physical Exam:  
Constitution:  the patient is well developed and in no acute distress. Obese EENMT:  Sclera clear, pupils equal, oral mucosa moist 
Respiratory: CTA B, no w/r/r 
Cardiovascular:  RRR without M,G,R 
 Gastrointestinal: soft and non-tender; with positive bowel sounds. Musculoskeletal: warm without cyanosis. There is 2+ B lower leg edema. Skin:  no jaundice or rashes, old, healed LLE wounds Neurologic: no gross neuro deficits Psychiatric:  alert and oriented x ppt CHEST XRAY: 1/17: low lung volumes, atelecatsis, no effusion LAB No lab exists for component: [place] Recent Labs  
  01/18/19 
0558 01/17/19 
0548 01/16/19 
0320 INR 1.6 1.4 1.5 Recent Labs  
  01/18/19 
0558 01/16/19 
0320  140  
K 4.7 3.9  105 CO2 32 30 * 104* BUN 44* 46* CREA 1.13 1.25  
CA 7.8* 7.9* ABG:   
No results found for: PH, PHI, PCO2, PCO2I, PO2, PO2I, HCO3, HCO3I, FIO2, FIO2I Assessment:  (Medical Decision Making) Hospital Problems  Date Reviewed: 10/2/2018 Codes Class Noted POA Acute respiratory failure with hypoxia and hypercapnia (HCC) ICD-10-CM: J96.01, J96.02 
ICD-9-CM: 518.81  1/17/2019 Unknown Obesity hypoventilation syndrome (HCC) ICD-10-CM: P86.4 ICD-9-CM: 278.03  1/14/2019 Unknown COPD exacerbation ([facility] [address] ICD-10-CM: J44.1 ICD-9-CM: 491.21  1/11/2019 Unknown Hyperkalemia ICD-10-CM: E87.5 ICD-9-CM: 276.7  1/10/2019 Unknown * (Principal) PNA (pneumonia) ICD-10-CM: J18.9 ICD-9-CM: 284  1/10/2019 Unknown Essential hypertension ICD-10-CM: I10 
ICD-9-CM: 401.9  2/6/2017 Yes Chronic systolic congestive heart failure (HCC) ICD-10-CM: I50.22 ICD-9-CM: 428.22, 428.0  10/27/2015 Yes AICD (automatic cardioverter/defibrillator) present ICD-10-CM: Z95.810 ICD-9-CM: V45.02  10/27/2015 Yes Paroxysmal atrial fibrillation (HCC) ICD-10-CM: I48.0 ICD-9-CM: 427.31  10/27/2015 Yes Aortic stenosis, mild to moderate ICD-10-CM: I35.0 ICD-9-CM: 424.1  10/27/2015 Yes Chronic back pain (Chronic) ICD-10-CM: M54.9, G89.29 ICD-9-CM: 724.5, 338.29  2/12/2015 Yes Obesity (Chronic) ICD-10-CM: M11.5 ICD-9-CM: 278.00  8/17/2012 Yes Coronary artery disease (Chronic) ICD-10-CM: I25.10 ICD-9-CM: 414.00  8/17/2012 Yes Overview Signed 8/17/2012  3:57 PM by Sherice Rodriguez Diagnosed 1999, CABG 12/2011 Cardiomyopathy, ischemic (Chronic) ICD-10-CM: I25.5 ICD-9-CM: 414.8  8/17/2012 Yes Overview Signed 8/17/2012  3:58 PM by Sherice Rodriguez Pacemaker AICD 12/2011,  35% LUEF Factor 5 Leiden mutation, heterozygous ([facility] [address] (Chronic) ICD-10-CM: Y58.41 
ICD-9-CM: 289.81  8/17/2012 Yes Overview Signed 8/17/2012  4:02 PM by Sherice Rodriguez Chronic anticoag., hx DVT Hyperlipidemia (Chronic) ICD-10-CM: L41.9 ICD-9-CM: 272.4  8/17/2012 Yes Patient admitted with acute hypoxic respiratory failure. Diagnosed with PNA but normal PCT, normal WBC. Completes abx today. Evidence of volume overload as well but BNP normal. May be multifactorial hypoxic respiratory failure. Exam clear this morning. On IV lasix but no strict I/O for the last 24 hours. Has dukes in but no inputs recorded. Plan:  (Medical Decision Making)  
-completed abx  
-off steroids. No wheeze on exam.  
-RT to walk for O2 requirement, likely will need short term O2 at discharge to rehab 
-cont to try and wean O2. Suspect secondary to edema and atelectasis 
-cont aggressive diuresis, daily BMP's, continues to tolerate well 
-PT, likely rehab from hospital 
-Will need outpatient sleep study after discharge Will see on Monday if still admitted Vicente Larose MD

## 2019-01-19 LAB
ANION GAP SERPL CALC-SCNC: 6 MMOL/L
BUN SERPL-MCNC: 46 MG/DL (ref 8–23)
CALCIUM SERPL-MCNC: 7.9 MG/DL (ref 8.3–10.4)
CHLORIDE SERPL-SCNC: 105 MMOL/L (ref 98–107)
CO2 SERPL-SCNC: 28 MMOL/L (ref 21–32)
CREAT SERPL-MCNC: 1.22 MG/DL (ref 0.8–1.5)
GLUCOSE SERPL-MCNC: 183 MG/DL (ref 65–100)
INR PPP: 2
POTASSIUM SERPL-SCNC: 4.6 MMOL/L (ref 3.5–5.1)
PROTHROMBIN TIME: 23.3 SEC (ref 11.7–14.5)
SODIUM SERPL-SCNC: 139 MMOL/L (ref 136–145)

## 2019-01-19 PROCEDURE — 97535 SELF CARE MNGMENT TRAINING: CPT

## 2019-01-19 PROCEDURE — 74011250637 HC RX REV CODE- 250/637: Performed by: FAMILY MEDICINE

## 2019-01-19 PROCEDURE — 97110 THERAPEUTIC EXERCISES: CPT

## 2019-01-19 PROCEDURE — 97116 GAIT TRAINING THERAPY: CPT

## 2019-01-19 PROCEDURE — 80048 BASIC METABOLIC PNL TOTAL CA: CPT

## 2019-01-19 PROCEDURE — 65270000029 HC RM PRIVATE

## 2019-01-19 PROCEDURE — 94640 AIRWAY INHALATION TREATMENT: CPT

## 2019-01-19 PROCEDURE — 74011250637 HC RX REV CODE- 250/637: Performed by: INTERNAL MEDICINE

## 2019-01-19 PROCEDURE — 74011250636 HC RX REV CODE- 250/636: Performed by: INTERNAL MEDICINE

## 2019-01-19 PROCEDURE — 85610 PROTHROMBIN TIME: CPT

## 2019-01-19 PROCEDURE — 94760 N-INVAS EAR/PLS OXIMETRY 1: CPT

## 2019-01-19 PROCEDURE — 74011000250 HC RX REV CODE- 250: Performed by: INTERNAL MEDICINE

## 2019-01-19 PROCEDURE — 97164 PT RE-EVAL EST PLAN CARE: CPT

## 2019-01-19 RX ADMIN — HYDROCODONE BITARTRATE AND ACETAMINOPHEN 1 TABLET: 10; 325 TABLET ORAL at 17:10

## 2019-01-19 RX ADMIN — Medication 10 ML: at 05:14

## 2019-01-19 RX ADMIN — CARVEDILOL 25 MG: 25 TABLET, FILM COATED ORAL at 17:09

## 2019-01-19 RX ADMIN — ENOXAPARIN SODIUM 40 MG: 40 INJECTION SUBCUTANEOUS at 11:36

## 2019-01-19 RX ADMIN — PANTOPRAZOLE SODIUM 40 MG: 40 TABLET, DELAYED RELEASE ORAL at 08:46

## 2019-01-19 RX ADMIN — IPRATROPIUM BROMIDE AND ALBUTEROL SULFATE 3 ML: .5; 3 SOLUTION RESPIRATORY (INHALATION) at 11:46

## 2019-01-19 RX ADMIN — Medication 10 ML: at 22:18

## 2019-01-19 RX ADMIN — WARFARIN SODIUM 12 MG: 5 TABLET ORAL at 17:09

## 2019-01-19 RX ADMIN — GUAIFENESIN 1200 MG: 600 TABLET, EXTENDED RELEASE ORAL at 22:15

## 2019-01-19 RX ADMIN — FUROSEMIDE 40 MG: 10 INJECTION, SOLUTION INTRAMUSCULAR; INTRAVENOUS at 08:47

## 2019-01-19 RX ADMIN — CARVEDILOL 25 MG: 25 TABLET, FILM COATED ORAL at 11:37

## 2019-01-19 RX ADMIN — LORATADINE 10 MG: 10 TABLET ORAL at 17:09

## 2019-01-19 RX ADMIN — IPRATROPIUM BROMIDE AND ALBUTEROL SULFATE 3 ML: .5; 3 SOLUTION RESPIRATORY (INHALATION) at 16:23

## 2019-01-19 RX ADMIN — ENOXAPARIN SODIUM 40 MG: 40 INJECTION SUBCUTANEOUS at 22:00

## 2019-01-19 RX ADMIN — AMLODIPINE BESYLATE 10 MG: 10 TABLET ORAL at 08:46

## 2019-01-19 RX ADMIN — CALCIUM CARBONATE 200 MG: 500 TABLET, CHEWABLE ORAL at 11:37

## 2019-01-19 RX ADMIN — DOCUSATE SODIUM 400 MG: 100 CAPSULE, LIQUID FILLED ORAL at 22:15

## 2019-01-19 RX ADMIN — IPRATROPIUM BROMIDE AND ALBUTEROL SULFATE 3 ML: .5; 3 SOLUTION RESPIRATORY (INHALATION) at 07:50

## 2019-01-19 RX ADMIN — BISACODYL 10 MG: 5 TABLET, COATED ORAL at 22:15

## 2019-01-19 RX ADMIN — PRAVASTATIN SODIUM 40 MG: 20 TABLET ORAL at 22:15

## 2019-01-19 RX ADMIN — GUAIFENESIN 1200 MG: 600 TABLET, EXTENDED RELEASE ORAL at 08:46

## 2019-01-19 RX ADMIN — ASPIRIN 81 MG: 81 TABLET, COATED ORAL at 08:46

## 2019-01-19 RX ADMIN — HYDROCODONE BITARTRATE AND ACETAMINOPHEN 1 TABLET: 10; 325 TABLET ORAL at 23:31

## 2019-01-19 RX ADMIN — HYDROCODONE BITARTRATE AND ACETAMINOPHEN 1 TABLET: 10; 325 TABLET ORAL at 08:46

## 2019-01-19 RX ADMIN — LISINOPRIL 20 MG: 20 TABLET ORAL at 08:46

## 2019-01-19 RX ADMIN — IPRATROPIUM BROMIDE AND ALBUTEROL SULFATE 3 ML: .5; 3 SOLUTION RESPIRATORY (INHALATION) at 21:46

## 2019-01-19 RX ADMIN — CALCIUM CARBONATE 200 MG: 500 TABLET, CHEWABLE ORAL at 17:10

## 2019-01-19 RX ADMIN — Medication 10 ML: at 14:07

## 2019-01-19 RX ADMIN — CALCIUM CARBONATE 200 MG: 500 TABLET, CHEWABLE ORAL at 08:45

## 2019-01-19 NOTE — PROGRESS NOTES
Pt sitting up in chair, not ready for continuous sat monitor. Pt states to come back after 11 and he will be back in bed and ready.

## 2019-01-19 NOTE — PROGRESS NOTES
Problem: Mobility Impaired (Adult and Pediatric) Goal: *Acute Goals and Plan of Care (Insert Text) STG: 
(1.)Mr. Elan Medina will move from supine to sit and sit to supine  with MINIMAL ASSIST within 3 treatment day(s)--goal met 1/19/2019. (2.)Mr. Elan Medina will transfer from bed to chair and chair to bed with MODERATE ASSIST using the least restrictive device within 3 treatment day(s). Met 1/14 LTG: 
(1.)Mr. Elan Medina will move from supine to sit and sit to supine  in bed with STAND BY ASSIST within 5-7 treatment day(s)--goal met 1/19/2019. (2.)Mr. Elan Medina will transfer from bed to chair and chair to bed with CONTACT GUARD ASSIST using the least restrictive device within 5-7 treatment day(s)--goal met 1/19/2019. (3.)Mr. Elan Medina will ambulate with MIN TO MODERATE ASSIST for 15 feet with the least restrictive device within 5-7  treatment day(s)--goal met 1/19/2019. (4.) NEW GOAL: Mr. Elan Medina will ambulate with SUPERVISION for 100 feet with least restrictive device within 7 treatment days. ________________________________________________________________________________________________ PHYSICAL THERAPY: Re-evaluation, Treatment Day: 7th, AM 1/19/2019INPATIENT: Hospital Day: 10 Payor: [facility] / Plan: [employer] / Product Type: [employer] Medicare /  
  
NAME/AGE/GENDER: Angela Sung is a 77 y.o. male PRIMARY DIAGNOSIS: PNA (pneumonia) [J18.9] Hypoxia [R09.02] Hyperkalemia [E87.5] Respiratory failure with hypercapnia (HCC) [J96.92] PNA (pneumonia) PNA (pneumonia) ICD-10: Treatment Diagnosis:  
 · Generalized Muscle Weakness (M62.81) · Other abnormalities of gait and mobility (R26.89) Precaution/Allergies: 
Patient has no known allergies.   
  
ASSESSMENT:  
Mr. Elan Medina presents with decreased mobility, decreased strength, decreased endurance, and decreased safety with ambulation secondary to degenerative changes. Patient actively participated with physical therapist during AM session including transfers, ambulation, and strengthening exercises. Patient would continue to benefit from skilled PT to improve overall mobility, endurance, and safety. Thank you for the opportunity to serve this patient. This section established at most recent assessment PROBLEM LIST (Impairments causing functional limitations): 1. Decreased Strength 2. Decreased ADL/Functional Activities 3. Decreased Transfer Abilities 4. Decreased Ambulation Ability/Technique 5. Decreased Balance 6. Decreased Activity Tolerance 7. Increased Shortness of Breath 8. Decreased Belt with Home Exercise Program 
 INTERVENTIONS PLANNED: (Benefits and precautions of physical therapy have been discussed with the patient.) 1. Balance Exercise 2. Bed Mobility 3. Gait Training 4. Home Exercise Program (HEP) 5. Therapeutic Activites 6. Therapeutic Exercise/Strengthening 7. Transfer Training TREATMENT PLAN: Frequency/Duration: daily for duration of hospital stay Rehabilitation Potential For Stated Goals: Fair RECOMMENDED REHABILITATION/EQUIPMENT: (at time of discharge pending progress): Due to the probability of continued deficits (see above) this patient will likely need continued skilled physical therapy after discharge. Equipment:  
? Walkers, Type: Rolling Walker;   
    
 
 
 
HISTORY:  
History of Present Injury/Illness (Reason for Referral): 
Patient states he has a history of falls; sometimes he can walk and sometimes he can't. Patient is not a good historian. Family not present during evaluation. PER MD NOTES: 
 \"77year-old male history of coronary artery disease, ischemic cardipmyopathy, hypertension, factor V Leiden, CABG, AICD for nonsustained V. tach, phlebitis, DVT, paroxysmal atrial fibrillation, aortic stenosis presents from his primary care office for pneumonia. Pt does have chronic sob on exertion since cabg- several years ago. Since past 2 weeks- nasal congestion,increased sob-- no fever or headache or dizziness or chest pain. Went to pcp today for his INR check- daughter complained that pt was looking ill- wanted pcp to evaluate- found to be hypoxic - oxygen sat 83%- was given breathing treatment- was told that has pneumonia- sent to er for further evaluation. In er found to have oxygen saturation 89- cxr- cardiomegaly with infiltrate- started in rocephin and zithromax. Mild elevated d-dimer- ordered ct chest with contrast - pending. Pt will be admitted for acute on chronic sob-secondary to pneumonia. \" 
 
 
Past Medical History/Comorbidities:  
Mr. Merline Sarmiento  has a past medical history of AICD (automatic cardioverter/defibrillator) present (10/27/2015), Aortic stenosis, mild to moderate (10/27/2015), Cardiomyopathy, ischemic (8/17/2012), Carotid occlusion, bilateral (8/20/2012), Chronic back pain (2/12/2015), Chronic rhinitis (71/63/4429), Chronic systolic congestive heart failure ([address] (10/27/2015), Coronary artery disease (8/17/2012), DVT, lower extremity, recurrent ([address] Factor 5 Leiden mutation, heterozygous ([address] (8/17/2012), Heart attack ([address] History of alcoholism ([address] (quit age 34), History of complete eye exam (10/2016), smoking (quit after 19), Hyperlipidemia (8/17/2012), Hypertension (8/17/2012), NSVT (nonsustained ventricular tachycardia) ([address] Obesity (8/17/2012), Osteoarthritis (2/12/2015), Pacemaker, Paroxysmal atrial fibrillation ([address] (10/27/2015), Phlebitis and thrombophlebitis (1995, 2004), S/P total knee arthroplasty (2/26/2015), Tobacco abuse (2/12/2015), and Wears dentures.   Mr. Merline Sarmiento  has a past surgical history that includes hx cataract removal; hx lipoma resection (1998); hx angioplasty (1999); hx implantable cardioverter defibrillator (12/18/2011); hx coronary artery bypass graft (x 3, 12/2011); hx orthopaedic (Left); and hx lymph node dissection. Social History/Living Environment:  
Home Environment: Apartment # Steps to Enter: 14 
Rails to Enter: Yes One/Two Story Residence: Two story(lives on second floor) Living Alone: Yes Support Systems: Friends \ neighbors Patient Expects to be Discharged to[de-identified] Rehabilitation facility Current DME Used/Available at Home: [employer], bedsidehas a daughter; unclear about living situation. Prior Level of Function/Work/Activity: 
Patient says he was able to ambulate but has history of falls. Dominant Side:  
      RIGHT Personal Factors:   
      Sex:  male Age:  77 y.o. Number of Personal Factors/Comorbidities that affect the Plan of Care: 1-2: MODERATE COMPLEXITY EXAMINATION:  
Most Recent Physical Functioning:  
Gross Assessment: 3-/5 throughout AROM: Generally decreased, functional 
PROM: Generally decreased, functional 
Strength: Generally decreased, functional 
Coordination: Generally decreased, functional 
Tone: Normal 
Sensation: Intact Posture (WDL): Exceptions to [facility] Posture Assessment: Rounded shoulders, Forward head Balance: 
Sitting: Intact Standing: Impaired Standing - Static: Good Standing - Dynamic : Fair Bed Mobility: 
Rolling: Contact guard assistance Supine to Sit: Contact guard assistance Sit to Supine: Contact guard assistance Scooting: Contact guard assistance Transfers: 
Sit to Stand: Contact guard assistance Stand to Sit: Contact guard assistance Bed to Chair: Contact guard assistance Gait:   
Base of Support: Center of gravity altered Speed/Blanca: Pace decreased (<100 feet/min) Step Length: Left shortened;Right shortened Swing Pattern: Left asymmetrical;Right asymmetrical 
Stance: Left increased;Right increased Gait Abnormalities: Antalgic Distance (ft): 50 Feet (ft) Assistive Device: Walker, rolling Ambulation - Level of Assistance: Modified independent Interventions: Verbal cues; Safety awareness training Duration: 15 Minutes Body Structures Involved: 1. Nerves 2. Heart 3. Lungs 4. Joints 5. Muscles Body Functions Affected: 1. Sensory/Pain 2. Neuromusculoskeletal 
3. Movement Related Activities and Participation Affected: 1. General Tasks and Demands 2. Mobility 3. Self Care 4. Domestic Life 5. Community, Social and [place] Number of elements that affect the Plan of Care: 3: MODERATE COMPLEXITY CLINICAL PRESENTATION:  
Presentation: Evolving clinical presentation with changing clinical characteristics: MODERATE COMPLEXITY CLINICAL DECISION MAKIN[address] [place] [address] AM-PAC 6 Clicks Basic Mobility Inpatient Short Form How much difficulty does the patient currently have. .. Unable A Lot A Little None 1. Turning over in bed (including adjusting bedclothes, sheets and blankets)? [] 1   [x] 2   [] 3   [] 4  
2. Sitting down on and standing up from a chair with arms ( e.g., wheelchair, bedside commode, etc.)   [] 1   [] 2   [x] 3   [] 4  
3. Moving from lying on back to sitting on the side of the bed? [] 1   [x] 2   [] 3   [] 4 How much help from another person does the patient currently need. .. Total A Lot A Little None 4. Moving to and from a bed to a chair (including a wheelchair)? [] 1   [] 2   [x] 3   [] 4  
5. Need to walk in hospital room? [] 1   [] 2   [x] 3   [] 4  
6. Climbing 3-5 steps with a railing? [] 1   [x] 2   [] 3   [] 4  
© , Trustees of [address] [place] [address], under license to [employer]. All rights reserved Score:  Initial: 9 Most Recent: 15 (Date: 2019 ) Interpretation of Tool:  Represents activities that are increasingly more difficult (i.e. Bed mobility, Transfers, Gait). Score 24 23 22-20 19-15 14-10 9-7 6 Modifier CH CI CJ CK CL CM CN Payor: [address] [place],9D / Plan: [address] / Product Type: Managed Care Medicare /   
 
 Medical Necessity:    
· Patient is expected to demonstrate progress in strength, range of motion, balance, coordination and functional technique to increase independence with mobility and improve safety during mobility. · Patient demonstrates good rehab potential due to higher previous functional level. · Skilled intervention continues to be required due to decreased mobility. Reason for Services/Other Comments: 
· Patient continues to demonstrate capacity to improve strength, mobility which will increase independence and increase safety. Use of outcome tool(s) and clinical judgement create a POC that gives a: Questionable prediction of patient's progress: MODERATE COMPLEXITY  
  
 
 
 
TREATMENT:  
(In addition to Assessment/Re-Assessment sessions the following treatments were rendered) Pre-treatment Symptoms/Complaints: 1/19/2019: Patient reports he did not sleep well last night and so his back is hurting this morning and he is feeling short of breath. Pain: Initial:  
Pain Intensity 1: 7 Pain Location 1: Back  Post Session: 7/10 (Back) Gait Training (15 Minutes):  Gait training to improve and/or restore physical functioning as related to mobility, strength, balance and coordination. Ambulated 50 Feet (ft) with Modified independent using a Walker, rolling and minimal Verbal cues; Safety awareness training related to their stance phase and hip position and motion to promote proper body alignment, promote proper body posture, promote proper body mechanics and promote proper body breathing techniques. Instruction in performance of ambulation to correct stance phase and hip position and motion. Therapeutic Exercise: (15 Minutes):  Exercises per grid below to improve strength. Required minimal visual cues to promote proper body alignment. Progressed range as indicated. Date: 
1/17 Date: Date: 
1/19/2019 Activity/Exercise   Parameters Ankle pumps 25 30 25 Chaordix glute sets 25 30 25 Hip ABD/ADD 25 30 25 Heel slides   25 LAQs 25 30 25  
marching 25 30 25 Sit to stand    5 Elbow flex/ext 25 30 Push/pull 25 30 All exercises are B Braces/Orthotics/Lines/Etc:  
· IV Treatment/Session Assessment:   
· Response to Treatment:  Patient tolerated treatment well with several rest breaks due to back pain and shortness of breath. · Interdisciplinary Collaboration:  
o Registered Nurse · After treatment position/precautions:  
o Up in chair 
o Bed/Chair-wheels locked 
o Call light within reach 
o RN notified · Compliance with Program/Exercises: Compliant most of the time · Recommendations/Intent for next treatment session: \"Next visit will focus on advancements to more challenging activities and reduction in assistance provided\". Total Treatment Duration: PT Patient Time In/Time Out Time In: 1010 Time Out: 1040 Reji Lee, PT

## 2019-01-19 NOTE — PROGRESS NOTES
Warfarin Dosing per Pharmacist 
Goal INR:  2 - 3 
  
Home dose:   5 - 10 mg? 
  
Risk factors or significant drug interactions:  none 
  
Other anticoagulants:  Enoxaparin 40mg q12h 
  
  
                 
  
Daily Monitoring Date                 INR          Warfarin dose         HGB              Notes 
  
  
1/10                 1.6                   5 mg                15.2 
1/11                 1.5                   5mg                 14.5  
1/12                 1.2                   5mg                 14 
1/13                 1.2                   5mg   
1/14                 1.2                 7.5mg   
1/15                 1.4                 7.5mg                13.6 1/16                 1.5                  10mg 1/17                 1.4                  12mg 1/18                 1.6                  12mg 1/19                 2.0                  12mg 
  
 We will continue the dose of warfarin 12mg this evening. We will continue to monitor a daily INR and will adjust the dose as necessary per guidelines. 
  
Thank you, Chong Ortega, PharmD

## 2019-01-19 NOTE — PROGRESS NOTES
Shift assessment complete. A&O x 4. No complaints at this time. Respirations even and unlabored. No distress noted. Call light within reach.

## 2019-01-19 NOTE — PROGRESS NOTES
Hospitalist Progress Note Admit Date:  1/10/2019  4:02 PM  
Name:  Maliha Hicks Age:  77 y.o. 
:  1940 MRN:  [id number] PCP:  Tu Ford MD 
Treatment Team: Attending Provider: Jovita Lopez MD; Consulting Provider: Italia Merchant MD; Utilization Review: Kamlesh Lopez RN; Hospitalist: Jovita Lopez MD 
 
Subjective:  
Patient admitted 1/10 for acute hypoxic respiratory failure and CAP. 
 
: Up to chair, he is currently on RA. Resting sat was mid 90s. Say she feels better. Ambulated around the room with PT earlier. Continues to improve. Diuresing well. ROS otherwise negative. Objective:  
 
Patient Vitals for the past 24 hrs: 
 Temp Pulse Resp BP SpO2  
19 1212 97.8 °F (36.6 °C) 69 23 101/62 92 % 19 1146     92 % 19 0750     93 % 19 0746 97.7 °F (36.5 °C) 68 25 107/65 92 % 19 0348 97.8 °F (36.6 °C) 68 16 129/73 92 % 19 0009 98.4 °F (36.9 °C) 72 20 114/65 92 % 19 2142     94 % 19 1954 97.9 °F (36.6 °C) 83 20 91/54 94 % 19 1636 98.7 °F (37.1 °C) 76 18 108/63 90 % 19 1633     92 % 19 1333     97 % Oxygen Therapy O2 Sat (%): 92 % (19 1212) Pulse via Oximetry: 76 beats per minute (19 1146) O2 Device: Room air (19 1146) O2 Flow Rate (L/min): 0 l/min (19 1633) O2 Temperature: (.) (01/15/19 2127) FIO2 (%): 21 % (19 0750) Intake/Output Summary (Last 24 hours) at 2019 1254 Last data filed at 2019 9353 Gross per 24 hour Intake  Output 650 ml Net -650 ml *Note that automatically entered I/Os may not be accurate; dependent on patient compliance with collection and accurate  by assistants. General:    Well nourished. Alert. CV:   RRR. No murmur, rub, or gallop. Lungs:   CTAB. No wheezing, rhonchi, or rales. RA oxygen. Abdomen:   Soft, nontender, nondistended. Extremities: Warm and dry. 2-3+ pitting edema b/l LEs. Skin:     No rashes or jaundice. Neuro:  No gross focal deficits Data Review: 
I have reviewed all labs, meds, telemetry events, and studies from the last 24 hours: 
 
Recent Results (from the past 24 hour(s)) PROTHROMBIN TIME + INR Collection Time: 19  7:09 AM  
Result Value Ref Range Prothrombin time 23.3 (H) 11.7 - 14.5 sec INR 2.0 METABOLIC PANEL, BASIC Collection Time: 19  7:09 AM  
Result Value Ref Range Sodium 139 136 - 145 mmol/L Potassium 4.6 3.5 - 5.1 mmol/L Chloride 105 98 - 107 mmol/L  
 CO2 28 21 - 32 mmol/L Anion gap 6 mmol/L Glucose 183 (H) 65 - 100 mg/dL BUN 46 (H) 8 - 23 MG/DL Creatinine 1.22 0.8 - 1.5 MG/DL  
 GFR est AA >60 >60 ml/min/1.73m2 GFR est non-AA >60 ml/min/1.73m2 Calcium 7.9 (L) 8.3 - 10.4 MG/DL All Micro Results None Results for orders placed or performed during the hospital encounter of 01/10/19  
2D ECHO COMPLETE ADULT (TTE) W OR WO CONTR Narrative [facility] [address] [place], [address] [facility] 
[phone] Transthoracic Echocardiogram 
2D, M-mode, Doppler, and Color Doppler Patient: Daryll Phalen 
MR #: [id number] : 1940 Age: 77 years Gender: Male Study date: 2019 Account #: [de-identified] Height: 72 in 
Weight: 278.5 lb 
BSA: 2.45 mï¾² Status:Routine Location: [facility] BP: 105/ 59 Allergies: NO KNOWN ALLERGENS Sonographer:  Zane Brand RD Group:  [address] 121 Cardiology Referring Physician:  Toi Jeffrey MD 
Reading Physician:  Ashleigh Yi MD [facility] INDICATIONS: CHF PROCEDURE: This was a routine study. A transthoracic echocardiogram was 
performed. The study included complete 2D imaging, M-mode, complete spectral 
Doppler, and color Doppler. Intravenous contrast (Definity, 3 ml) was administered. Echocardiographic views were limited by poor acoustic window 
availability and poor body habitus. This was a technically difficult study. LEFT VENTRICLE: Size was at the upper limits of normal. Systolic function was 
mildly to moderately reduced. Ejection fraction was estimated in the range of 
40 % to 45 %. There was moderate hypokinesis of the basal inferoseptal  
wall(s). There was akinesis of the basal-mid inferior wall(s). There was moderate 
concentric hypertrophy. Left ventricular diastolic dysfunction Grade 2. Average E/e' of 26.7. RIGHT VENTRICLE: The ventricle was dilated. Systolic function was low normal. 
 A 
pacing wire was present. LEFT ATRIUM: The atrium was moderately to markedly dilated. RIGHT ATRIUM: Not well visualized. SYSTEMIC VEINS: IVC: The inferior vena cava was dilated. The respirophasic 
change in diameter was less than 50%. AORTIC VALVE: The valve was trileaflet. DI: 0.43. The aortic valve area by  
the 
continuity equation was 1.46 cm2. The peak velocity was 3.24 m/s. The mean 
pressure gradient was 19 mmHg. The peak pressure gradient was 42 mmHg. There 
was no insufficiency. MITRAL VALVE: Valve structure was normal. There was no evidence for stenosis. There was trivial regurgitation. TRICUSPID VALVE: The valve structure was normal. There was no evidence for 
stenosis. There was trivial regurgitation. PULMONIC VALVE: Not well visualized. PERICARDIUM: There was no pericardial effusion. AORTA: The root exhibited normal size. SUMMARY: 
 
-  Left ventricle: Size was at the upper limits of normal. Systolic function 
was mildly to moderately reduced. Ejection fraction was estimated in the  
range 
of 40 % to 45 %. There was moderate hypokinesis of the basal inferoseptal 
wall(s). There was akinesis of the basal-mid inferior wall(s). There was 
moderate concentric hypertrophy. 
 
-  Right ventricle: The ventricle was dilated. -  Left atrium: The atrium was moderately to markedly dilated. -  Inferior vena cava, hepatic veins: The inferior vena cava was dilated. The 
respirophasic change in diameter was less than 50%. -  Aortic valve: The aortic valve area by the continuity equation was 1.46  
cm2. SYSTEM MEASUREMENT TABLES 
 
2D mode Left Atrium Systolic Volume Index; Method of Disks, Biplane; 2D mode;: 55.9 
ml/m2 IVS/LVPW (2D): 0.9 IVSd (2D): 1.9 cm LVIDd (2D): 5.5 cm LVIDs (2D): 5 cm 
LVOT Area (2D): 4.5 cm2 LVPWd (2D): 2 cm RVIDd (2D): 3.7 cm Unspecified Scan Mode Peak Grad; Mean; Antegrade Flow: 38 mm[Hg] Vmax; Antegrade Flow: 292 cm/s LVOT Diam: 2.4 cm Prepared and signed by 
 
Daron Donovan. Brandon Floyd MD [facility] Signed 14-Jan-2019 16:10:12 Current Meds: 
Current Facility-Administered Medications Medication Dose Route Frequency  warfarin (COUMADIN) tablet 12 mg  12 mg Oral QPM  
 furosemide (LASIX) injection 40 mg  40 mg IntraVENous DAILY  albuterol-ipratropium (DUO-NEB) 2.5 MG-0.5 MG/3 ML  3 mL Nebulization QID RT  
 enoxaparin (LOVENOX) injection 40 mg  40 mg SubCUTAneous Q12H  pantoprazole (PROTONIX) tablet 40 mg  40 mg Oral ACB  calcium carbonate (TUMS) chewable tablet 200 mg [elemental]  200 mg Oral TID WITH MEALS  nystatin (MYCOSTATIN) 100,000 unit/gram powder   Topical BID  influenza vaccine 2018-19 (6 mos+)(PF) (FLUARIX QUAD/FLULAVAL QUAD) injection 0.5 mL  0.5 mL IntraMUSCular PRIOR TO DISCHARGE  sodium chloride (NS) flush 10 mL  10 mL InterCATHeter Q8H  
 bisacodyl (DULCOLAX) tablet 10 mg  10 mg Oral QHS  docusate sodium (COLACE) capsule 400 mg  400 mg Oral QHS  amLODIPine (NORVASC) tablet 10 mg  10 mg Oral DAILY  aspirin delayed-release tablet 81 mg  81 mg Oral DAILY  carvedilol (COREG) tablet 25 mg  25 mg Oral BID WITH MEALS  guaiFENesin ER (MUCINEX) tablet 1,200 mg  1,200 mg Oral BID  
  HYDROcodone-acetaminophen (NORCO)  mg tablet 1 Tab  1 Tab Oral Q6H PRN  
 lisinopril (PRINIVIL, ZESTRIL) tablet 20 mg  20 mg Oral DAILY  pravastatin (PRAVACHOL) tablet 40 mg  40 mg Oral QHS  sodium chloride (NS) flush 5-40 mL  5-40 mL IntraVENous PRN  
 acetaminophen (TYLENOL) tablet 650 mg  650 mg Oral Q4H PRN  
 morphine injection 1 mg  1 mg IntraVENous Q4H PRN  
 naloxone (NARCAN) injection 0.4 mg  0.4 mg IntraVENous PRN  
 ondansetron (ZOFRAN) injection 4 mg  4 mg IntraVENous Q4H PRN  
 hydrALAZINE (APRESOLINE) 20 mg/mL injection 10 mg  10 mg IntraVENous Q6H PRN  
 loratadine (CLARITIN) tablet 10 mg  10 mg Oral DAILY Other Studies (last 24 hours): No results found. Assessment and Plan:  
 
Hospital Problems as of 1/19/2019 Date Reviewed: 10/2/2018 Codes Class Noted - Resolved POA Acute respiratory failure with hypoxia and hypercapnia (HCC) ICD-10-CM: J96.01, J96.02 
ICD-9-CM: 518.81  1/17/2019 - Present Unknown Obesity hypoventilation syndrome (HCC) ICD-10-CM: J71.9 ICD-9-CM: 278.03  1/14/2019 - Present Unknown [facility] exacerbation ([facility] ICD-10-CM: J44.1 ICD-9-CM: 491.21  1/11/2019 - Present Unknown Hyperkalemia ICD-10-CM: E87.5 ICD-9-CM: 276.7  1/10/2019 - Present Unknown * (Principal) PNA (pneumonia) ICD-10-CM: J18.9 ICD-9-CM: 989  1/10/2019 - Present Unknown Essential hypertension ICD-10-CM: I10 
ICD-9-CM: 401.9  2/6/2017 - Present Yes Chronic systolic congestive heart failure (HCC) ICD-10-CM: I50.22 ICD-9-CM: 428.22, 428.0  10/27/2015 - Present Yes AICD (automatic cardioverter/defibrillator) present ICD-10-CM: Z95.810 ICD-9-CM: V45.02  10/27/2015 - Present Yes Paroxysmal atrial fibrillation (HCC) ICD-10-CM: I48.0 ICD-9-CM: 427.31  10/27/2015 - Present Yes Aortic stenosis, mild to moderate ICD-10-CM: I35.0 ICD-9-CM: 424.1  10/27/2015 - Present Yes  Osteoarthritis (Chronic) ICD-10-CM: M19.90 
 ICD-9-CM: 715.90  2/12/2015 - Present Overview Signed 10/27/2015  9:58 AM by Lorraine Castle With severe chronic low back pain. Chronic back pain (Chronic) ICD-10-CM: M54.9, G89.29 ICD-9-CM: 724.5, 338.29  2/12/2015 - Present Yes Obesity (Chronic) ICD-10-CM: W80.2 ICD-9-CM: 278.00  8/17/2012 - Present Yes Coronary artery disease (Chronic) ICD-10-CM: I25.10 ICD-9-CM: 414.00  8/17/2012 - Present Yes Overview Signed 8/17/2012  3:57 PM by Shaye Mccloud Diagnosed 1999, CABG 12/2011 Cardiomyopathy, ischemic (Chronic) ICD-10-CM: I25.5 ICD-9-CM: 414.8  8/17/2012 - Present Yes Overview Signed 8/17/2012  3:58 PM by Shaye Mccloud Pacemaker AICD 12/2011,  35% LUEF Factor 5 Leiden mutation, heterozygous ([facility] (Chronic) ICD-10-CM: I19.71 
ICD-9-CM: 289.81  8/17/2012 - Present Yes Overview Signed 8/17/2012  4:02 PM by Shaye Mccloud Chronic anticoag., hx DVT Hyperlipidemia (Chronic) ICD-10-CM: G28.4 ICD-9-CM: 272.4  8/17/2012 - Present Yes Plan: # Acute hypoxic/hypercapnic respiratory failure 
           - 2/2 CAP with superimposed, untreated JACOB/OHS 
           - Last dose abx 1/17 
           - Transferred to floor 1/16. CPAP/APAP qhs. KYM done. 
           - Appreciate pulm.  
  
# CAP 
            - ceftriaxone/azithro, last dose 1/17 
  
# COPD 
            - steroids dc, nebs 
  
# Chronic dCHF 
            - home meds.  
            - TTE unchanged from 2017 
  
# HTN 
            - home meds 
  
# H/o DVT/Factor V 
            - warfarin DC planning/Dispo:  STR referrals pending. If declined may consider dc home. Diet:  DIET CARDIAC 
DVT ppx:  warfarin Signed: 
Fredrich Sandhoff, MD

## 2019-01-19 NOTE — PROGRESS NOTES
Problem: Self Care Deficits Care Plan (Adult) Goal: *Acute Goals and Plan of Care (Insert Text) 1. Patient will perform grooming with stand by assistance seated edge of bed. 2. Patient will perform Upper body dressing with minimal assistance seated edge of bed. 3. Patient will perform upper body bathing with minimal assistance seated edge of bed. 4. Patient will participate in 30 + minutes of ADL/ therapeutic exercise/therapeutic activity with min rest breaks to increase activity tolerance for self care. PROGRESSING Goals to be achieved in 7 days. Mobility goals to be set as mobility is further assessed. OCCUPATIONAL THERAPY: Daily Note, Treatment Day: 3rd and AM 1/19/2019INPATIENT: Hospital Day: 10 Payor: Ralph Lang / Plan: [address] / Product Type: [employer] Medicare /  
  
NAME/AGE/GENDER: Hayde Fox is a 77 y.o. male PRIMARY DIAGNOSIS:  PNA (pneumonia) [J18.9] Hypoxia [R09.02] Hyperkalemia [E87.5] Respiratory failure with hypercapnia (HCC) [J96.92] PNA (pneumonia) PNA (pneumonia) ICD-10: Treatment Diagnosis:  
 
 · Generalized Muscle Weakness (M62.81) Precautions/Allergies: 
   Patient has no known allergies. ASSESSMENT:  
Mr. David Garcia presents with above diagnosis and was noted to have significant generalized weakness. Pt noted he fell getting in/out tub shower combo 6 months ago and suffered a wound to his leg that required home health care and the wound center to get it healed. In that time his became sedentary and with decreased ability to move and do for himself. There was a woman in the room that stated she was his daughter and lived in the apartment below him. .. Unsure of this authenticity of this relationship. She does seem to care for him and says she is willing to help. He will, however, need short term rehab post discharge due to significant debility.  Pt will benefit from OT while in the hospital to start the process of increased independence and safety with basic self care. 1/16/19 1305 patient sitting up in recliner most of the day and worked with PT. He declined bathing off but agreed to B UE therapeutic exercise. He was living alone prior to admit and was mod I having home health nursing addressing his Left leg wound. He plans for further therapy at a rehab then to be discharged to his \"daughters home\" that is accessible. He is agreeable and motivated. He is moving out of ICU to the floor per nursing. Will continue with OT poc 
 
1/18/19 1430 patient sitting up in recliner. He walked with PT earlier today down the martin. He stood and walked to the bathroom sat on BSC frame on the toilet and then stood and returned back to recliner all with SBA using RW. He was on room air he is making good progress. Will continue with POC. Manjula Casper 1/19/19 Pt was sitting in chair upon arrival. Pt completed functional mobility to toilet. Pt completed toileting and grooming with supervision. Pt returned to chair. Pt is progressing towards goals. Continue POC. This section established at most recent assessment PROBLEM LIST (Impairments causing functional limitations): 1. Decreased Strength 2. Decreased ADL/Functional Activities 3. Decreased Transfer Abilities 4. Decreased Ambulation Ability/Technique 5. Decreased Balance 6. Decreased Activity Tolerance INTERVENTIONS PLANNED: (Benefits and precautions of occupational therapy have been discussed with the patient.) 1. Activities of daily living training 2. Adaptive equipment training 3. Balance training 4. Therapeutic activity 5. Therapeutic exercise TREATMENT PLAN: Frequency/Duration: Follow patient 3 times per week to address above goals. Rehabilitation Potential For Stated Goals: Good RECOMMENDED REHABILITATION/EQUIPMENT: (at time of discharge pending progress): Due to the probability of continued deficits (see above) this patient will likely need continued skilled occupational therapy after discharge. Equipment:  
? None at this time OCCUPATIONAL PROFILE AND HISTORY:  
History of Present Injury/Illness (Reason for Referral): 
weakness Past Medical History/Comorbidities:  
Mr. Zeina Gleason  has a past medical history of AICD (automatic cardioverter/defibrillator) present (10/27/2015), Aortic stenosis, mild to moderate (10/27/2015), Cardiomyopathy, ischemic (8/17/2012), Carotid occlusion, bilateral (8/20/2012), Chronic back pain (2/12/2015), Chronic rhinitis (17/11/8635), Chronic systolic congestive heart failure ([facility] (10/27/2015), Coronary artery disease (8/17/2012), DVT, lower extremity, recurrent ([facility] Factor 5 Leiden mutation, heterozygous ([facility] (8/17/2012), Heart attack ([facility] History of alcoholism ([facility] (quit age 34), History of complete eye exam (10/2016), smoking (quit after 19), Hyperlipidemia (8/17/2012), Hypertension (8/17/2012), NSVT (nonsustained ventricular tachycardia) ([facility] Obesity (8/17/2012), Osteoarthritis (2/12/2015), Pacemaker, Paroxysmal atrial fibrillation ([facility] (10/27/2015), Phlebitis and thrombophlebitis (1995, 2004), S/P total knee arthroplasty (2/26/2015), Tobacco abuse (2/12/2015), and Wears dentures. Mr. Zeina Gleason  has a past surgical history that includes hx cataract removal; hx lipoma resection (1998); hx angioplasty (1999); hx implantable cardioverter defibrillator (12/18/2011); hx coronary artery bypass graft (x 3, 12/2011); hx orthopaedic (Left); and hx lymph node dissection. Social History/Living Environment:  
Home Environment: Apartment # Steps to Enter: 14 
Rails to Enter: Yes One/Two Story Residence: Two story(lives on second floor) Living Alone: Yes Support Systems: Friends \ neighbors Patient Expects to be Discharged to[de-identified] Rehabilitation facility Current DME Used/Available at Home: Commode, bedside Prior Level of Function/Work/Activity: Independent prior to a fall 6 month ago. Number of Personal Factors/Comorbidities that affect the Plan of Care: Expanded review of therapy/medical records (1-2):  MODERATE COMPLEXITY ASSESSMENT OF OCCUPATIONAL PERFORMANCE[de-identified]  
Activities of Daily Living:  
Basic ADLs (From Assessment) Complex ADLs (From Assessment) Feeding: Independent Oral Facial Hygiene/Grooming: Minimum assistance Bathing: Maximum assistance Upper Body Dressing: Moderate assistance Lower Body Dressing: Total assistance Toileting: Total assistance(bed pan, urinal) Grooming/Bathing/Dressing Activities of Daily Living Grooming Washing Face: Supervision/set-up Cognitive Retraining Safety/Judgement: Fall prevention Toileting Bladder Hygiene: Supervision/set-up Functional Transfers Toilet Transfer : Supervision Adaptive Equipment: Walker (comment);Grab bars Bed/Mat Mobility Rolling: Contact guard assistance Supine to Sit: Contact guard assistance Sit to Supine: Contact guard assistance Sit to Stand: Supervision Bed to Chair: Contact guard assistance Scooting: Contact guard assistance Most Recent Physical Functioning:  
Gross Assessment: 
  
         
  
Posture: 
Posture (WDL): Exceptions to [facility] Posture Assessment: Rounded shoulders, Forward head Balance: 
Sitting: Intact Standing: Pull to stand; With support Standing - Static: Good Standing - Dynamic : Fair Bed Mobility: 
Rolling: Contact guard assistance Supine to Sit: Contact guard assistance Sit to Supine: Contact guard assistance Scooting: Contact guard assistance Wheelchair Mobility: 
  
Transfers: 
Sit to Stand: Supervision Stand to Sit: Contact guard assistance Bed to Chair: Contact guard assistance Patient Vitals for the past 6 hrs: 
 BP BP Patient Position SpO2 Pulse 01/19/19 0746 107/65 At rest 92 % 68  
01/19/19 0750   93 %   
01/19/19 1146   92 %   
01/19/19 1212 101/62 At rest 92 % 69 Mental Status Neurologic State: Alert Orientation Level: Oriented X4 Cognition: Follows commands Perception: Appears intact Perseveration: No perseveration noted Safety/Judgement: Fall prevention Physical Skills Involved: 
1. Balance 2. Strength 3. Activity Tolerance Cognitive Skills Affected (resulting in the inability to perform in a timely and safe manner): 1. maikel Psychosocial Skills Affected: 1. Environmental Adaptation Number of elements that affect the Plan of Care: 3-5:  MODERATE COMPLEXITY CLINICAL DECISION MAKING:  
[facility] AM-PAC 6 Clicks Daily Activity Inpatient Short Form How much help from another person does the patient currently need. .. Total A Lot A Little None 1. Putting on and taking off regular lower body clothing? [x] 1   [] 2   [] 3   [] 4  
2. Bathing (including washing, rinsing, drying)? [] 1   [x] 2   [] 3   [] 4  
3. Toileting, which includes using toilet, bedpan or urinal?   [] 1   [x] 2   [] 3   [] 4  
4. Putting on and taking off regular upper body clothing? [] 1   [x] 2   [] 3   [] 4  
5. Taking care of personal grooming such as brushing teeth? [] 1   [] 2   [x] 3   [] 4  
6. Eating meals? [] 1   [] 2   [] 3   [x] 4  
© 2007, Trustees of [facility] [employer], under license to [employer]. All rights reserved Score:  Initial: 14 Most Recent: X (Date: -- ) Interpretation of Tool:  Represents activities that are increasingly more difficult (i.e. Bed mobility, Transfers, Gait). Score 24 23 22-20 19-15 14-10 9-7 6 Modifier CH CI CJ CK CL CM CN   
 
? Self Care:  
  - CURRENT STATUS: CL - 60%-79% impaired, limited or restricted  - GOAL STATUS: CK - 40%-59% impaired, limited or restricted  - D/C STATUS:  ---------------To be determined--------------- Payor: [facility] MEDICARE / Plan: [address] / Product Type: Managed Care Medicare /   
 
Medical Necessity: · Patient is expected to demonstrate progress in strength, balance, coordination and functional technique to increase independence with self care. Reason for Services/Other Comments: 
· Patient would benefit from OT to maximize safety and independence with self care and functional mobility . Use of outcome tool(s) and clinical judgement create a POC that gives a: MODERATE COMPLEXITY  
 
 
 
TREATMENT:  
(In addition to Assessment/Re-Assessment sessions the following treatments were rendered) Pre-treatment Symptoms/Complaints:   
Pain: Initial:  
Pain Intensity 1: 0 0 Post Session:  0 Self Care: (10): Procedure(s) (per grid) utilized to improve and/or restore self-care/home management as related to toileting and grooming. Required minimal verbal cueing to facilitate activities of daily living skills and compensatory activities. Date: 
1/16/19 Date: 
 Date: Activity/Exercise Parameters Parameters Parameters Single UE shoulder flexion R then L 10 reps B forward punches  15 reps Gross grasps flex/ext 15 reps Shoulder shruggs 15 reps Braces/Orthotics/Lines/Etc:  
· room air Treatment/Session Assessment:   
· Response to Treatment:  Pt with good understanding of why he is in hospital and the therapy required to get back to his baseline motivated to get stronger to move into his daughters home in February 2019 · Interdisciplinary Collaboration:  
o Certified Occupational Therapy Assistant 
o Registered Nurse · After treatment position/precautions:  
o Up in chair 
o Bed/Chair-wheels locked 
o Call light within reach 
o RN notified · Compliance with Program/Exercises: Compliant all of the time, Will assess as treatment progresses. · Recommendations/Intent for next treatment session: \"Next visit will focus on reduction in assistance provided\". Total Treatment Duration: OT Patient Time In/Time Out Time In: 1140 Time Out: 1150 Danis Giron

## 2019-01-20 LAB
ANION GAP SERPL CALC-SCNC: 6 MMOL/L
BUN SERPL-MCNC: 38 MG/DL (ref 8–23)
CALCIUM SERPL-MCNC: 8.1 MG/DL (ref 8.3–10.4)
CHLORIDE SERPL-SCNC: 104 MMOL/L (ref 98–107)
CO2 SERPL-SCNC: 27 MMOL/L (ref 21–32)
CREAT SERPL-MCNC: 1.16 MG/DL (ref 0.8–1.5)
GLUCOSE SERPL-MCNC: 123 MG/DL (ref 65–100)
INR PPP: 2.3
POTASSIUM SERPL-SCNC: 4.3 MMOL/L (ref 3.5–5.1)
PROTHROMBIN TIME: 25.5 SEC (ref 11.7–14.5)
SODIUM SERPL-SCNC: 137 MMOL/L (ref 136–145)

## 2019-01-20 PROCEDURE — 74011250636 HC RX REV CODE- 250/636: Performed by: INTERNAL MEDICINE

## 2019-01-20 PROCEDURE — 74011250637 HC RX REV CODE- 250/637: Performed by: INTERNAL MEDICINE

## 2019-01-20 PROCEDURE — 65270000029 HC RM PRIVATE

## 2019-01-20 PROCEDURE — 36415 COLL VENOUS BLD VENIPUNCTURE: CPT

## 2019-01-20 PROCEDURE — 97116 GAIT TRAINING THERAPY: CPT

## 2019-01-20 PROCEDURE — 74011000250 HC RX REV CODE- 250: Performed by: INTERNAL MEDICINE

## 2019-01-20 PROCEDURE — 85610 PROTHROMBIN TIME: CPT

## 2019-01-20 PROCEDURE — 74011250637 HC RX REV CODE- 250/637: Performed by: FAMILY MEDICINE

## 2019-01-20 PROCEDURE — 80048 BASIC METABOLIC PNL TOTAL CA: CPT

## 2019-01-20 PROCEDURE — 94760 N-INVAS EAR/PLS OXIMETRY 1: CPT

## 2019-01-20 PROCEDURE — 94640 AIRWAY INHALATION TREATMENT: CPT

## 2019-01-20 RX ORDER — FUROSEMIDE 40 MG/1
40 TABLET ORAL
Status: DISCONTINUED | OUTPATIENT
Start: 2019-01-20 | End: 2019-01-21 | Stop reason: HOSPADM

## 2019-01-20 RX ADMIN — LORATADINE 10 MG: 10 TABLET ORAL at 17:19

## 2019-01-20 RX ADMIN — CALCIUM CARBONATE 200 MG: 500 TABLET, CHEWABLE ORAL at 17:18

## 2019-01-20 RX ADMIN — LISINOPRIL 20 MG: 20 TABLET ORAL at 09:15

## 2019-01-20 RX ADMIN — WARFARIN SODIUM 12 MG: 5 TABLET ORAL at 17:18

## 2019-01-20 RX ADMIN — PANTOPRAZOLE SODIUM 40 MG: 40 TABLET, DELAYED RELEASE ORAL at 09:15

## 2019-01-20 RX ADMIN — DOCUSATE SODIUM 400 MG: 100 CAPSULE, LIQUID FILLED ORAL at 21:44

## 2019-01-20 RX ADMIN — Medication 10 ML: at 22:00

## 2019-01-20 RX ADMIN — IPRATROPIUM BROMIDE AND ALBUTEROL SULFATE 3 ML: .5; 3 SOLUTION RESPIRATORY (INHALATION) at 08:30

## 2019-01-20 RX ADMIN — PRAVASTATIN SODIUM 40 MG: 20 TABLET ORAL at 21:44

## 2019-01-20 RX ADMIN — CALCIUM CARBONATE 200 MG: 500 TABLET, CHEWABLE ORAL at 09:15

## 2019-01-20 RX ADMIN — HYDROCODONE BITARTRATE AND ACETAMINOPHEN 1 TABLET: 10; 325 TABLET ORAL at 23:47

## 2019-01-20 RX ADMIN — FUROSEMIDE 40 MG: 40 TABLET ORAL at 17:18

## 2019-01-20 RX ADMIN — ASPIRIN 81 MG: 81 TABLET, COATED ORAL at 09:15

## 2019-01-20 RX ADMIN — AMLODIPINE BESYLATE 10 MG: 10 TABLET ORAL at 09:15

## 2019-01-20 RX ADMIN — Medication 10 ML: at 06:03

## 2019-01-20 RX ADMIN — BISACODYL 10 MG: 5 TABLET, COATED ORAL at 21:44

## 2019-01-20 RX ADMIN — CALCIUM CARBONATE 200 MG: 500 TABLET, CHEWABLE ORAL at 12:07

## 2019-01-20 RX ADMIN — FUROSEMIDE 40 MG: 10 INJECTION, SOLUTION INTRAMUSCULAR; INTRAVENOUS at 09:16

## 2019-01-20 RX ADMIN — IPRATROPIUM BROMIDE AND ALBUTEROL SULFATE 3 ML: .5; 3 SOLUTION RESPIRATORY (INHALATION) at 12:09

## 2019-01-20 RX ADMIN — CARVEDILOL 25 MG: 25 TABLET, FILM COATED ORAL at 17:19

## 2019-01-20 RX ADMIN — HYDROCODONE BITARTRATE AND ACETAMINOPHEN 1 TABLET: 10; 325 TABLET ORAL at 09:25

## 2019-01-20 RX ADMIN — IPRATROPIUM BROMIDE AND ALBUTEROL SULFATE 3 ML: .5; 3 SOLUTION RESPIRATORY (INHALATION) at 16:50

## 2019-01-20 RX ADMIN — CARVEDILOL 25 MG: 25 TABLET, FILM COATED ORAL at 09:15

## 2019-01-20 RX ADMIN — ENOXAPARIN SODIUM 40 MG: 40 INJECTION SUBCUTANEOUS at 09:21

## 2019-01-20 RX ADMIN — Medication 10 ML: at 14:00

## 2019-01-20 RX ADMIN — GUAIFENESIN 1200 MG: 600 TABLET, EXTENDED RELEASE ORAL at 21:44

## 2019-01-20 RX ADMIN — IPRATROPIUM BROMIDE AND ALBUTEROL SULFATE 3 ML: .5; 3 SOLUTION RESPIRATORY (INHALATION) at 20:40

## 2019-01-20 RX ADMIN — GUAIFENESIN 1200 MG: 600 TABLET, EXTENDED RELEASE ORAL at 09:15

## 2019-01-20 RX ADMIN — HYDROCODONE BITARTRATE AND ACETAMINOPHEN 1 TABLET: 10; 325 TABLET ORAL at 17:25

## 2019-01-20 NOTE — PROGRESS NOTES
Warfarin Dosing per Pharmacist 
Goal INR:  2 - 3 
  
Home dose:   5 - 10 mg? 
  
Risk factors or significant drug interactions:  none 
  
Other anticoagulants:  Enoxaparin 40mg q12h 
  
  
  
  
Daily Monitoring Date                 INR          Warfarin Angelique Tejada              CCURT 
  
  
1/10                 1.6                   5 mg                15.2 
1/11                 1.5                   0NP                 14.5  
1/12                 1.2                   5mg                 77 
1/13                 1.2                   5mg   
1/14                 9.9                 7.5IK   
1/15                 1.4                 4.2RC                28.2 1/16                 1.5                  10mg 1/17                 1.4                  88JB 1/18                 1.6                  12mg 1/19                 2.0                  12mg 1/20                 2.3                  12mg                
  
 We will continue the dose of warfarin 12mg this evening.  We will continue to monitor a daily INR and will adjust the dose as necessary per guidelines. An HGB has been ordered for 1/21/19 
  
Thank you, Harry Abdul, PharmD

## 2019-01-20 NOTE — PROGRESS NOTES
Pt resting in recliner and is alert and oriented x 4. he denies pain and is on room air. RR even and unlabored. Call light in reach and pt instructed to call for assistance if needed. Will monitor.

## 2019-01-20 NOTE — PROGRESS NOTES
Problem: Mobility Impaired (Adult and Pediatric) Goal: *Acute Goals and Plan of Care (Insert Text) STG: 
(1.)Mr. Buzz Ovalle will move from supine to sit and sit to supine  with MINIMAL ASSIST within 3 treatment day(s). (2.)Mr. Buzz Ovalle will transfer from bed to chair and chair to bed with MODERATE ASSIST using the least restrictive device within 3 treatment day(s). Met 1/14 LTG: 
(1.)Mr. Buzz Ovalle will move from supine to sit and sit to supine  in bed with STAND BY ASSIST within 5-7 treatment day(s). (2.)Mr. Buzz Ovalle will transfer from bed to chair and chair to bed with CONTACT GUARD ASSIST using the least restrictive device within 5-7 treatment day(s). (3.)Mr. Buzz Ovalle will ambulate with MIN TO MODERATE ASSIST for 15 feet with the least restrictive device within 5-7  treatment day(s). ________________________________________________________________________________________________ PHYSICAL THERAPY: Daily Note, Treatment Day: 6th, AM 1/20/2019INPATIENT: Hospital Day: 11 Payor: Demetri Jay / Plan: [address] / Product Type: [facility] Medicare /  
  
NAME/AGE/GENDER: Ruth Collado is a 77 y.o. male PRIMARY DIAGNOSIS: PNA (pneumonia) [J18.9] Hypoxia [R09.02] Hyperkalemia [E87.5] Respiratory failure with hypercapnia (HCC) [J96.92] PNA (pneumonia) PNA (pneumonia) ICD-10: Treatment Diagnosis:  
 · Generalized Muscle Weakness (M62.81) · Other abnormalities of gait and mobility (R26.89) Precaution/Allergies: 
Patient has no known allergies. ASSESSMENT:  
Mr. Buzz Ovalle is going into the restroom upon arrival.  He goes from sit>stand with CGA-SBA while therapist clean pt up. Then he walks 50 ft back to the chair. He remain in the chair with call light near. This section established at most recent assessment PROBLEM LIST (Impairments causing functional limitations): 1. Decreased Strength 2. Decreased ADL/Functional Activities 3. Decreased Transfer Abilities 4. Decreased Ambulation Ability/Technique 5. Decreased Balance 6. Decreased Activity Tolerance 7. Increased Shortness of Breath 8. Decreased Miller with Home Exercise Program 
 INTERVENTIONS PLANNED: (Benefits and precautions of physical therapy have been discussed with the patient.) 1. Balance Exercise 2. Bed Mobility 3. Gait Training 4. Home Exercise Program (HEP) 5. Therapeutic Activites 6. Therapeutic Exercise/Strengthening 7. Transfer Training TREATMENT PLAN: Frequency/Duration: daily for duration of hospital stay Rehabilitation Potential For Stated Goals: Fair RECOMMENDED REHABILITATION/EQUIPMENT: (at time of discharge pending progress): Due to the probability of continued deficits (see above) this patient will likely need continued skilled physical therapy after discharge. Equipment:  
? Walkers, Type: Rolling Walker;   
    
 
 
 
HISTORY:  
History of Present Injury/Illness (Reason for Referral): 
Patient states he has a history of falls; sometimes he can walk and sometimes he can't. Patient is not a good historian. Family not present during evaluation. PER MD NOTES: 
 \"77year-old male history of coronary artery disease, ischemic cardipmyopathy, hypertension, factor V Leiden, CABG, AICD for nonsustained V. tach, phlebitis, DVT, paroxysmal atrial fibrillation, aortic stenosis presents from his primary care office for pneumonia. Pt does have chronic sob on exertion since cabg- several years ago. Since past 2 weeks- nasal congestion,increased sob-- no fever or headache or dizziness or chest pain. Went to pcp today for his INR check- daughter complained that pt was looking ill- wanted pcp to evaluate- found to be hypoxic - oxygen sat 83%- was given breathing treatment- was told that has pneumonia- sent to er for further evaluation. In er found to have oxygen saturation 89- cxr- cardiomegaly with infiltrate- started in rocephin and zithromax. Mild elevated d-dimer- ordered ct chest with contrast - pending. Pt will be admitted for acute on chronic sob-secondary to pneumonia. \" 
 
 
Past Medical History/Comorbidities:  
Mr. Ava Sapp  has a past medical history of AICD (automatic cardioverter/defibrillator) present (10/27/2015), Aortic stenosis, mild to moderate (10/27/2015), Cardiomyopathy, ischemic (8/17/2012), Carotid occlusion, bilateral (8/20/2012), Chronic back pain (2/12/2015), Chronic rhinitis (67/29/0044), Chronic systolic congestive heart failure ([facility] [address] (10/27/2015), Coronary artery disease (8/17/2012), DVT, lower extremity, recurrent ([address] Factor 5 Leiden mutation, heterozygous ([facility] [address] (8/17/2012), Heart attack ([address] History of alcoholism ([facility] [address] (quit age 34), History of complete eye exam (10/2016), smoking (quit after 19), Hyperlipidemia (8/17/2012), Hypertension (8/17/2012), NSVT (nonsustained ventricular tachycardia) ([facility] [address] Obesity (8/17/2012), Osteoarthritis (2/12/2015), Pacemaker, Paroxysmal atrial fibrillation ([address] (10/27/2015), Phlebitis and thrombophlebitis (1995, 2004), S/P total knee arthroplasty (2/26/2015), Tobacco abuse (2/12/2015), and Wears dentures. Mr. Ava Sapp  has a past surgical history that includes hx cataract removal; hx lipoma resection (1998); hx angioplasty (1999); hx implantable cardioverter defibrillator (12/18/2011); hx coronary artery bypass graft (x 3, 12/2011); hx orthopaedic (Left); and hx lymph node dissection. Social History/Living Environment:  
Home Environment: Apartment # Steps to Enter: 14 
Rails to Enter: Yes One/Two Story Residence: Two story(lives on second floor) Living Alone: Yes Support Systems: Friends \ neighbors Patient Expects to be Discharged to[de-identified] Rehabilitation facility Current DME Used/Available at Home: [employer], bedsidehas a daughter; unclear about living situation. Prior Level of Function/Work/Activity: 
Patient says he was able to ambulate but has history of falls. Dominant Side: RIGHT Personal Factors:   
      Sex:  male Age:  77 y.o. Number of Personal Factors/Comorbidities that affect the Plan of Care: 1-2: MODERATE COMPLEXITY EXAMINATION:  
Most Recent Physical Functioning:  
Gross Assessment: 3-/5 throughout Balance: 
  Bed Mobility: 
  
 
  
Transfers: 
Sit to Stand: Stand-by assistance;Contact guard assistance Stand to Sit: Stand-by assistance;Contact guard assistance Bed to Chair: Stand-by assistance;Contact guard assistance Gait: pt a little SOB after he used the [facility] Speed/Blanca: Pace decreased (<100 feet/min) Step Length: Left shortened;Right shortened Distance (ft): 50 Feet (ft) Assistive Device: Walker, rolling Ambulation - Level of Assistance: Stand-by assistance;Contact guard assistance Interventions: Safety awareness training Duration: 23 Minutes Body Structures Involved: 1. Nerves 2. Heart 3. Lungs 4. Joints 5. Muscles Body Functions Affected: 1. Sensory/Pain 2. Neuromusculoskeletal 
3. Movement Related Activities and Participation Affected: 1. General Tasks and Demands 2. Mobility 3. Self Care 4. Domestic Life 5. Community, Social and [place] Number of elements that affect the Plan of Care: 3: MODERATE COMPLEXITY CLINICAL PRESENTATION:  
Presentation: Evolving clinical presentation with changing clinical characteristics: MODERATE COMPLEXITY CLINICAL DECISION MAKING:  
[facility] 6 Clicks Basic Mobility Inpatient Short Form How much difficulty does the patient currently have. .. Unable A Lot A Little None 1. Turning over in bed (including adjusting bedclothes, sheets and blankets)? [] 1   [x] 2   [] 3   [] 4  
2. Sitting down on and standing up from a chair with arms ( e.g., wheelchair, bedside commode, etc.)   [] 1   [x] 2   [] 3   [] 4  
3. Moving from lying on back to sitting on the side of the bed?    [] 1   [x] 2   [] 3   [] 4  
 How much help from another person does the patient currently need. .. Total A Lot A Little None 4. Moving to and from a bed to a chair (including a wheelchair)? [x] 1   [] 2   [] 3   [] 4  
5. Need to walk in hospital room? [x] 1   [] 2   [] 3   [] 4  
6. Climbing 3-5 steps with a railing? [x] 1   [] 2   [] 3   [] 4  
© 2007, Trustees of [facility], under license to [employer]. All rights reserved Score:  Initial: 9 Most Recent: X (Date: -- ) Interpretation of Tool:  Represents activities that are increasingly more difficult (i.e. Bed mobility, Transfers, Gait). Score 24 23 22-20 19-15 14-10 9-7 6 Modifier CH CI CJ CK CL CM CN   
 
? Mobility - Walking and Moving Around:  
  - CURRENT STATUS: CM - 80%-99% impaired, limited or restricted  - GOAL STATUS: CL - 60%-79% impaired, limited or restricted  - D/C STATUS:  ---------------To be determined--------------- Payor: [employer] MEDICARE / Plan: [address] / Product Type: Managed Care Medicare /   
 
Medical Necessity:    
· Patient is expected to demonstrate progress in strength, balance and functional technique to increase independence with mobility and improve safety during mobility. Reason for Services/Other Comments: 
· Patient continues to demonstrate capacity to improve strength, mobility which will increase independence and increase safety. Use of outcome tool(s) and clinical judgement create a POC that gives a: Questionable prediction of patient's progress: MODERATE COMPLEXITY  
  
 
 
 
TREATMENT:  
(In addition to Assessment/Re-Assessment sessions the following treatments were rendered) Pre-treatment Symptoms/Complaints: agreeable for therapy Pain: Initial: visual cues Pain Intensity 1: 0(none)  Post Session:   
 
Therapeutic Activity: (    ):  Therapeutic activities including repeated sit<>stand & stand pivot transfer with walker, Therapeutic Exercise: ( ): Exercises per grid below to improve strength. Required minimal visual cues to promote proper body alignment. Progressed range as indicated. Gait Training (23 Minutes):  Gait training to improve and/or restore physical functioning as related to mobility. Ambulated 50 Feet (ft) with Stand-by assistance;Contact guard assistance using a Walker, rolling and minimal Safety awareness training Date: 
1/13/19 Date: 
1/15 Date: 
1/16 Date: 
1/17 Date: Activity/Exercise Parameters Parameters Parameters Ankle pumps 15 B 15  15 25 30 Quad sets 15 B  15 15    
glute sets 15 B 15 15 25 30 Hip ABD/ADD 15 B 15 15 25 30 Heel slides 15 B 15 15 LAQs 15 B 15 15 25 30  
marching 15 B 15 15 25 30 Sit to stand  x5 Elbow flex/ext    25 30 Push/pull    25 30 All exercises are B Braces/Orthotics/Lines/Etc:  
· dukes catheter Treatment/Session Assessment:   
· Response to Treatment:  Continue to make slow and steady progress. · Interdisciplinary Collaboration:  
o Registered Nurse · After treatment position/precautions:  
o Up in chair 
o Bed/Chair-wheels locked 
o Call light within reach 
o RN notified · Compliance with Program/Exercises: Will assess as treatment progresses · Recommendations/Intent for next treatment session: \"Next visit will focus on advancements to more challenging activities and reduction in assistance provided\". Total Treatment Duration: PT Patient Time In/Time Out Time In: 0930 Time Out: 5829 Meeta Pena, PTA

## 2019-01-20 NOTE — PROGRESS NOTES
Shift assessment complete. A&O. No distress noted. No complaints at this time. Call light within reach.

## 2019-01-20 NOTE — PROGRESS NOTES
Hospitalist Progress Note Admit Date:  1/10/2019  4:02 PM  
Name:  Wing Hunt Age:  77 y.o. 
:  1940 MRN:  [id number] PCP:  Alex Palmer MD 
Treatment Team: Attending Provider: Maddie Salcido MD; Consulting Provider: Guillermo Grace MD; Utilization Review: Alba Fontenot, MIKE; Hospitalist: Maddie Salcido MD 
 
Subjective:  
Patient admitted 1/10 for acute hypoxic respiratory failure and CAP. 
 
: Off supp O2 since . Does not like APAP. Up in chair again, breathing is stable. Ambulating around room. No other acute issues. ROS otherwise negative. Objective:  
 
Patient Vitals for the past 24 hrs: 
 Temp Pulse Resp BP SpO2  
19 0900 97.8 °F (36.6 °C) 78 22 155/82 95 % 19 0830     95 % 19 0311 98.1 °F (36.7 °C) 70 22 130/70 93 % 19 2321 98 °F (36.7 °C) 70 20 121/72 97 % 19 2149     93 % 19 2002 98.2 °F (36.8 °C) 66 20 117/64 94 % 19 1704 98 °F (36.7 °C) 68 22 112/65 95 % 19 1623     97 % 19 1212 97.8 °F (36.6 °C) 69 23 101/62 92 % 19 1146     92 % Oxygen Therapy O2 Sat (%): 95 % (19 0900) Pulse via Oximetry: 68 beats per minute (19 0830) O2 Device: Room air (19) O2 Flow Rate (L/min): 0 l/min (19 1633) O2 Temperature: (.) (01/15/19 2127) FIO2 (%): 21 % (19) Intake/Output Summary (Last 24 hours) at 2019 1111 Last data filed at 2019 9416 Gross per 24 hour Intake  Output 400 ml Net -400 ml *Note that automatically entered I/Os may not be accurate; dependent on patient compliance with collection and accurate  by assistants. General:    Well nourished. Alert. Obese CV:   RRR. No murmur, rub, or gallop. Lungs:   CTAB. No wheezing, rhonchi, or rales. Abdomen:   Soft, nontender, nondistended. Extremities: Warm and dry. 2-+ pitting edema b/l Skin:     No rashes or jaundice. Neuro:  No gross focal deficits Data Review: 
I have reviewed all labs, meds, telemetry events, and studies from the last 24 hours: 
 
Recent Results (from the past 24 hour(s)) PROTHROMBIN TIME + INR Collection Time: 19  5:29 AM  
Result Value Ref Range Prothrombin time 25.5 (H) 11.7 - 14.5 sec INR 2.3 METABOLIC PANEL, BASIC Collection Time: 19  5:29 AM  
Result Value Ref Range Sodium 137 136 - 145 mmol/L Potassium 4.3 3.5 - 5.1 mmol/L Chloride 104 98 - 107 mmol/L  
 CO2 27 21 - 32 mmol/L Anion gap 6 mmol/L Glucose 123 (H) 65 - 100 mg/dL BUN 38 (H) 8 - 23 MG/DL Creatinine 1.16 0.8 - 1.5 MG/DL  
 GFR est AA >60 >60 ml/min/1.73m2 GFR est non-AA >60 ml/min/1.73m2 Calcium 8.1 (L) 8.3 - 10.4 MG/DL All Micro Results None Results for orders placed or performed during the hospital encounter of 01/10/19  
2D ECHO COMPLETE ADULT (TTE) W OR WO CONTR Narrative [facility] [address] [place], [address] [facility] 
[phone] Transthoracic Echocardiogram 
2D, M-mode, Doppler, and Color Doppler Patient: Garett Carreon 
MR #: [id number] : 1940 Age: 77 years Gender: Male Study date: 2019 Account #: [de-identified] Height: 72 in 
Weight: 278.5 lb 
BSA: 2.45 mï¾² Status:Routine Location: [facility] BP: 105/ 59 Allergies: NO KNOWN ALLERGENS Sonographer:  Chad Nielsen RD Group:  [address] Cardiology Referring Physician:  Charissa Kingsley MD 
Reading Physician:  Jessica Shelby MD [facility] INDICATIONS: CHF PROCEDURE: This was a routine study. A transthoracic echocardiogram was 
performed. The study included complete 2D imaging, M-mode, complete spectral 
Doppler, and color Doppler. Intravenous contrast (Definity, 3 ml) was 
administered. Echocardiographic views were limited by poor acoustic window availability and poor body habitus. This was a technically difficult study. LEFT VENTRICLE: Size was at the upper limits of normal. Systolic function was 
mildly to moderately reduced. Ejection fraction was estimated in the range of 
40 % to 45 %. There was moderate hypokinesis of the basal inferoseptal  
wall(s). There was akinesis of the basal-mid inferior wall(s). There was moderate 
concentric hypertrophy. Left ventricular diastolic dysfunction Grade 2. Average E/e' of 26.7. RIGHT VENTRICLE: The ventricle was dilated. Systolic function was low normal. 
 A 
pacing wire was present. LEFT ATRIUM: The atrium was moderately to markedly dilated. RIGHT ATRIUM: Not well visualized. SYSTEMIC VEINS: IVC: The inferior vena cava was dilated. The respirophasic 
change in diameter was less than 50%. AORTIC VALVE: The valve was trileaflet. DI: 0.43. The aortic valve area by  
the 
continuity equation was 1.46 cm2. The peak velocity was 3.24 m/s. The mean 
pressure gradient was 19 mmHg. The peak pressure gradient was 42 mmHg. There 
was no insufficiency. MITRAL VALVE: Valve structure was normal. There was no evidence for stenosis. There was trivial regurgitation. TRICUSPID VALVE: The valve structure was normal. There was no evidence for 
stenosis. There was trivial regurgitation. PULMONIC VALVE: Not well visualized. PERICARDIUM: There was no pericardial effusion. AORTA: The root exhibited normal size. SUMMARY: 
 
-  Left ventricle: Size was at the upper limits of normal. Systolic function 
was mildly to moderately reduced. Ejection fraction was estimated in the  
range 
of 40 % to 45 %. There was moderate hypokinesis of the basal inferoseptal 
wall(s). There was akinesis of the basal-mid inferior wall(s). There was 
moderate concentric hypertrophy. 
 
-  Right ventricle: The ventricle was dilated. -  Left atrium: The atrium was moderately to markedly dilated. -  Inferior vena cava, hepatic veins: The inferior vena cava was dilated. The 
respirophasic change in diameter was less than 50%. -  Aortic valve: The aortic valve area by the continuity equation was 1.46  
cm2. SYSTEM MEASUREMENT TABLES 
 
2D mode Left Atrium Systolic Volume Index; Method of Disks, Biplane; 2D mode;: 55.9 
ml/m2 IVS/LVPW (2D): 0.9 IVSd (2D): 1.9 cm LVIDd (2D): 5.5 cm LVIDs (2D): 5 cm 
LVOT Area (2D): 4.5 cm2 LVPWd (2D): 2 cm RVIDd (2D): 3.7 cm Unspecified Scan Mode Peak Grad; Mean; Antegrade Flow: 38 mm[Hg] Vmax; Antegrade Flow: 292 cm/s LVOT Diam: 2.4 cm Prepared and signed by 
 
Keri Cuellar. Rl Reyes MD [facility] Signed 14-Jan-2019 16:10:12 Current Meds: 
Current Facility-Administered Medications Medication Dose Route Frequency  furosemide (LASIX) tablet 40 mg  40 mg Oral ACB&D  warfarin (COUMADIN) tablet 12 mg  12 mg Oral QPM  
 albuterol-ipratropium (DUO-NEB) 2.5 MG-0.5 MG/3 ML  3 mL Nebulization QID RT  
 pantoprazole (PROTONIX) tablet 40 mg  40 mg Oral ACB  calcium carbonate (TUMS) chewable tablet 200 mg [elemental]  200 mg Oral TID WITH MEALS  nystatin (MYCOSTATIN) 100,000 unit/gram powder   Topical BID  influenza vaccine 2018-19 (6 mos+)(PF) (FLUARIX QUAD/FLULAVAL QUAD) injection 0.5 mL  0.5 mL IntraMUSCular PRIOR TO DISCHARGE  sodium chloride (NS) flush 10 mL  10 mL InterCATHeter Q8H  
 bisacodyl (DULCOLAX) tablet 10 mg  10 mg Oral QHS  docusate sodium (COLACE) capsule 400 mg  400 mg Oral QHS  amLODIPine (NORVASC) tablet 10 mg  10 mg Oral DAILY  aspirin delayed-release tablet 81 mg  81 mg Oral DAILY  carvedilol (COREG) tablet 25 mg  25 mg Oral BID WITH MEALS  guaiFENesin ER (MUCINEX) tablet 1,200 mg  1,200 mg Oral BID  
 HYDROcodone-acetaminophen (NORCO)  mg tablet 1 Tab  1 Tab Oral Q6H PRN  
 lisinopril (PRINIVIL, ZESTRIL) tablet 20 mg  20 mg Oral DAILY  pravastatin (PRAVACHOL) tablet 40 mg  40 mg Oral QHS  sodium chloride (NS) flush 5-40 mL  5-40 mL IntraVENous PRN  
 acetaminophen (TYLENOL) tablet 650 mg  650 mg Oral Q4H PRN  
 morphine injection 1 mg  1 mg IntraVENous Q4H PRN  
 naloxone (NARCAN) injection 0.4 mg  0.4 mg IntraVENous PRN  
 ondansetron (ZOFRAN) injection 4 mg  4 mg IntraVENous Q4H PRN  
 hydrALAZINE (APRESOLINE) 20 mg/mL injection 10 mg  10 mg IntraVENous Q6H PRN  
 loratadine (CLARITIN) tablet 10 mg  10 mg Oral DAILY Other Studies (last 24 hours): No results found. Assessment and Plan:  
 
Hospital Problems as of 1/20/2019 Date Reviewed: 10/2/2018 Codes Class Noted - Resolved POA Acute respiratory failure with hypoxia and hypercapnia (HCC) ICD-10-CM: J96.01, J96.02 
ICD-9-CM: 518.81  1/17/2019 - Present Unknown Obesity hypoventilation syndrome (HCC) ICD-10-CM: N51.7 ICD-9-CM: 278.03  1/14/2019 - Present Unknown COPD exacerbation ([facility] [address] ICD-10-CM: J44.1 ICD-9-CM: 491.21  1/11/2019 - Present Unknown Hyperkalemia ICD-10-CM: E87.5 ICD-9-CM: 276.7  1/10/2019 - Present Unknown * (Principal) PNA (pneumonia) ICD-10-CM: J18.9 ICD-9-CM: 704  1/10/2019 - Present Unknown Essential hypertension ICD-10-CM: I10 
ICD-9-CM: 401.9  2/6/2017 - Present Yes Chronic systolic congestive heart failure (HCC) ICD-10-CM: I50.22 ICD-9-CM: 428.22, 428.0  10/27/2015 - Present Yes AICD (automatic cardioverter/defibrillator) present ICD-10-CM: Z95.810 ICD-9-CM: V45.02  10/27/2015 - Present Yes Paroxysmal atrial fibrillation (HCC) ICD-10-CM: I48.0 ICD-9-CM: 427.31  10/27/2015 - Present Yes Aortic stenosis, mild to moderate ICD-10-CM: I35.0 ICD-9-CM: 424.1  10/27/2015 - Present Yes Osteoarthritis (Chronic) ICD-10-CM: M19.90 ICD-9-CM: 715.90  2/12/2015 - Present Overview Signed 10/27/2015  9:58 AM by Génesis Auguste With severe chronic low back pain.  
  
  
   
 Chronic back pain (Chronic) ICD-10-CM: M54.9, G89.29 
 ICD-9-CM: 724.5, 338.29  2/12/2015 - Present Yes Obesity (Chronic) ICD-10-CM: C18.8 ICD-9-CM: 278.00  8/17/2012 - Present Yes Coronary artery disease (Chronic) ICD-10-CM: I25.10 ICD-9-CM: 414.00  8/17/2012 - Present Yes Overview Signed 8/17/2012  3:57 PM by Charla Jimenez Diagnosed 1999, CABG 12/2011 Cardiomyopathy, ischemic (Chronic) ICD-10-CM: I25.5 ICD-9-CM: 414.8  8/17/2012 - Present Yes Overview Signed 8/17/2012  3:58 PM by Charla Jimenez Pacemaker AICD 12/2011,  35% LUEF Factor 5 Leiden mutation, heterozygous ([facility] [address] (Chronic) ICD-10-CM: E05.75 
ICD-9-CM: 289.81  8/17/2012 - Present Yes Overview Signed 8/17/2012  4:02 PM by Charla Jimenez Chronic anticoag., hx DVT Hyperlipidemia (Chronic) ICD-10-CM: A22.8 ICD-9-CM: 272.4  8/17/2012 - Present Yes Plan: # Acute hypoxic/hypercapnic respiratory failure 
           - 2/2 CAP with superimposed, untreated JACOB/OHS 
           - Last dose abx 1/17 
           - Transferred to floor 1/16. CPAP/APAP qhs but patient does not like. KYM done. Off supp O2 during the day. 
           - Appreciate pulm.  
  
# CAP 
            - ceftriaxone/azithro, last dose 1/17 
  
# COPD 
            - steroids dc, nebs 
  
# Chronic dCHF 
            - home meds. Change to PO Lasix 1/20. 
            - TTE unchanged from 2017 
  
# HTN 
            - home meds 
  
# H/o DVT/Factor V 
            - warfarin DC planning/Dispo: PO lasix. Hopefully to STR tomorrow. Diet:  DIET CARDIAC 
DVT ppx:  warfarin Signed: 
Viktoria Navarro MD

## 2019-01-21 VITALS
WEIGHT: 306.4 LBS | HEIGHT: 72 IN | OXYGEN SATURATION: 92 % | DIASTOLIC BLOOD PRESSURE: 68 MMHG | RESPIRATION RATE: 18 BRPM | BODY MASS INDEX: 41.5 KG/M2 | HEART RATE: 83 BPM | TEMPERATURE: 97.3 F | SYSTOLIC BLOOD PRESSURE: 114 MMHG

## 2019-01-21 PROBLEM — J96.01 ACUTE RESPIRATORY FAILURE WITH HYPOXIA AND HYPERCAPNIA (HCC): Status: RESOLVED | Noted: 2019-01-17 | Resolved: 2019-01-21

## 2019-01-21 PROBLEM — J18.9 PNA (PNEUMONIA): Status: RESOLVED | Noted: 2019-01-10 | Resolved: 2019-01-21

## 2019-01-21 PROBLEM — J96.02 ACUTE RESPIRATORY FAILURE WITH HYPOXIA AND HYPERCAPNIA (HCC): Status: RESOLVED | Noted: 2019-01-17 | Resolved: 2019-01-21

## 2019-01-21 PROBLEM — E87.5 HYPERKALEMIA: Status: RESOLVED | Noted: 2019-01-10 | Resolved: 2019-01-21

## 2019-01-21 PROBLEM — J44.1 COPD EXACERBATION (HCC): Status: RESOLVED | Noted: 2019-01-11 | Resolved: 2019-01-21

## 2019-01-21 LAB
HGB BLD-MCNC: 13.2 G/DL (ref 13.6–17.2)
INR PPP: 2.9
PROTHROMBIN TIME: 30.5 SEC (ref 11.7–14.5)

## 2019-01-21 PROCEDURE — 85018 HEMOGLOBIN: CPT

## 2019-01-21 PROCEDURE — 94760 N-INVAS EAR/PLS OXIMETRY 1: CPT

## 2019-01-21 PROCEDURE — 74011250637 HC RX REV CODE- 250/637: Performed by: FAMILY MEDICINE

## 2019-01-21 PROCEDURE — 94640 AIRWAY INHALATION TREATMENT: CPT

## 2019-01-21 PROCEDURE — 74011000250 HC RX REV CODE- 250: Performed by: INTERNAL MEDICINE

## 2019-01-21 PROCEDURE — 36415 COLL VENOUS BLD VENIPUNCTURE: CPT

## 2019-01-21 PROCEDURE — 85610 PROTHROMBIN TIME: CPT

## 2019-01-21 PROCEDURE — 99232 SBSQ HOSP IP/OBS MODERATE 35: CPT | Performed by: INTERNAL MEDICINE

## 2019-01-21 PROCEDURE — 97535 SELF CARE MNGMENT TRAINING: CPT

## 2019-01-21 PROCEDURE — 74011250637 HC RX REV CODE- 250/637: Performed by: INTERNAL MEDICINE

## 2019-01-21 RX ORDER — WARFARIN 7.5 MG/1
7.5 TABLET ORAL DAILY
Qty: 30 TAB | Refills: 0 | Status: SHIPPED | OUTPATIENT
Start: 2019-01-21 | End: 2019-04-25 | Stop reason: SDUPTHER

## 2019-01-21 RX ORDER — WARFARIN SODIUM 5 MG/1
10 TABLET ORAL EVERY EVENING
Status: DISCONTINUED | OUTPATIENT
Start: 2019-01-21 | End: 2019-01-21 | Stop reason: HOSPADM

## 2019-01-21 RX ORDER — FUROSEMIDE 40 MG/1
40 TABLET ORAL 2 TIMES DAILY
Qty: 60 TAB | Refills: 0 | Status: SHIPPED | OUTPATIENT
Start: 2019-01-21 | End: 2019-07-23 | Stop reason: SDUPTHER

## 2019-01-21 RX ADMIN — LISINOPRIL 20 MG: 20 TABLET ORAL at 08:02

## 2019-01-21 RX ADMIN — CARVEDILOL 25 MG: 25 TABLET, FILM COATED ORAL at 08:02

## 2019-01-21 RX ADMIN — GUAIFENESIN 1200 MG: 600 TABLET, EXTENDED RELEASE ORAL at 08:02

## 2019-01-21 RX ADMIN — ASPIRIN 81 MG: 81 TABLET, COATED ORAL at 08:02

## 2019-01-21 RX ADMIN — HYDROCODONE BITARTRATE AND ACETAMINOPHEN 1 TABLET: 10; 325 TABLET ORAL at 08:08

## 2019-01-21 RX ADMIN — AMLODIPINE BESYLATE 10 MG: 10 TABLET ORAL at 08:02

## 2019-01-21 RX ADMIN — PANTOPRAZOLE SODIUM 40 MG: 40 TABLET, DELAYED RELEASE ORAL at 08:02

## 2019-01-21 RX ADMIN — CALCIUM CARBONATE 200 MG: 500 TABLET, CHEWABLE ORAL at 08:02

## 2019-01-21 RX ADMIN — IPRATROPIUM BROMIDE AND ALBUTEROL SULFATE 3 ML: .5; 3 SOLUTION RESPIRATORY (INHALATION) at 08:06

## 2019-01-21 RX ADMIN — Medication 10 ML: at 06:00

## 2019-01-21 RX ADMIN — FUROSEMIDE 40 MG: 40 TABLET ORAL at 08:02

## 2019-01-21 RX ADMIN — IPRATROPIUM BROMIDE AND ALBUTEROL SULFATE 3 ML: .5; 3 SOLUTION RESPIRATORY (INHALATION) at 11:18

## 2019-01-21 NOTE — PROGRESS NOTES
Problem: Self Care Deficits Care Plan (Adult) Goal: *Acute Goals and Plan of Care (Insert Text) 1. Patient will perform grooming with stand by assistance seated edge of bed. 2. Patient will perform Upper body dressing with minimal assistance seated edge of bed. 3. Patient will perform upper body bathing with minimal assistance seated edge of bed. 4. Patient will participate in 30 + minutes of ADL/ therapeutic exercise/therapeutic activity with min rest breaks to increase activity tolerance for self care. PROGRESSING Goals to be achieved in 7 days. Mobility goals to be set as mobility is further assessed. OCCUPATIONAL THERAPY: Daily Note, Treatment Day: 3rd and AM 1/21/2019INPATIENT: Hospital Day: 12 Payor: Paula Frias / Plan: [address] / Product Type: [employer] Medicare /  
  
NAME/AGE/GENDER: No Bravo is a 77 y.o. male PRIMARY DIAGNOSIS:  PNA (pneumonia) [J18.9] Hypoxia [R09.02] Hyperkalemia [E87.5] Respiratory failure with hypercapnia (HCC) [J96.92] PNA (pneumonia) PNA (pneumonia) ICD-10: Treatment Diagnosis:  
 
 · Generalized Muscle Weakness (M62.81) Precautions/Allergies: 
   Patient has no known allergies. ASSESSMENT:  
Mr. Nickolas Schmidt presents with above diagnosis and was noted to have significant generalized weakness. Pt noted he fell getting in/out tub shower combo 6 months ago and suffered a wound to his leg that required home health care and the wound center to get it healed. In that time his became sedentary and with decreased ability to move and do for himself. There was a woman in the room that stated she was his daughter and lived in the apartment below him. .. Unsure of this authenticity of this relationship. She does seem to care for him and says she is willing to help. He will, however, need short term rehab post discharge due to significant debility.  Pt will benefit from OT while in the hospital to start the process of increased independence and safety with basic self care. 1/16/19 1305 patient sitting up in recliner most of the day and worked with PT. He declined bathing off but agreed to B UE therapeutic exercise. He was living alone prior to admit and was mod I having home health nursing addressing his Left leg wound. He plans for further therapy at a rehab then to be discharged to his \"daughters home\" that is accessible. He is agreeable and motivated. He is moving out of ICU to the floor per nursing. Will continue with OT poc 
 
1/18/19 1430 patient sitting up in recliner. He walked with PT earlier today down the martin. He stood and walked to the bathroom sat on BSC frame on the toilet and then stood and returned back to recliner all with SBA using RW. He was on room air he is making good progress. Will continue with POC. Maryann [place] 1/19/19 Pt was sitting in chair upon arrival. Pt completed functional mobility to toilet. Pt completed toileting and grooming with supervision. Pt returned to chair. Pt is progressing towards goals. Continue POC. 
 
1/211/19: Pt found sitting in cahir upon OT arrival. Pt completed functional mobility to toilet with use of a walker and supervision. Pt completed toileting and grooming with supervision. Pt left in chair with needs in reach. Pt is progressing towards goals. Continue POC. This section established at most recent assessment PROBLEM LIST (Impairments causing functional limitations): 1. Decreased Strength 2. Decreased ADL/Functional Activities 3. Decreased Transfer Abilities 4. Decreased Ambulation Ability/Technique 5. Decreased Balance 6. Decreased Activity Tolerance INTERVENTIONS PLANNED: (Benefits and precautions of occupational therapy have been discussed with the patient.) 1. Activities of daily living training 2. Adaptive equipment training 3. Balance training 4. Therapeutic activity 5. Therapeutic exercise TREATMENT PLAN: Frequency/Duration: Follow patient 3 times per week to address above goals. Rehabilitation Potential For Stated Goals: Good RECOMMENDED REHABILITATION/EQUIPMENT: (at time of discharge pending progress): Due to the probability of continued deficits (see above) this patient will likely need continued skilled occupational therapy after discharge. Equipment:  
? None at this time OCCUPATIONAL PROFILE AND HISTORY:  
History of Present Injury/Illness (Reason for Referral): 
weakness Past Medical History/Comorbidities:  
Mr. Sarah Atwood  has a past medical history of AICD (automatic cardioverter/defibrillator) present (10/27/2015), Aortic stenosis, mild to moderate (10/27/2015), Cardiomyopathy, ischemic (8/17/2012), Carotid occlusion, bilateral (8/20/2012), Chronic back pain (2/12/2015), Chronic rhinitis (44/15/6695), Chronic systolic congestive heart failure ([facility] [address] (10/27/2015), Coronary artery disease (8/17/2012), DVT, lower extremity, recurrent ([address] Factor 5 Leiden mutation, heterozygous ([address] (8/17/2012), Heart attack ([address] History of alcoholism ([address] (quit age 34), History of complete eye exam (10/2016), smoking (quit after 19), Hyperlipidemia (8/17/2012), Hypertension (8/17/2012), NSVT (nonsustained ventricular tachycardia) ([address] Obesity (8/17/2012), Osteoarthritis (2/12/2015), Pacemaker, Paroxysmal atrial fibrillation ([address] (10/27/2015), Phlebitis and thrombophlebitis (1995, 2004), S/P total knee arthroplasty (2/26/2015), Tobacco abuse (2/12/2015), and Wears dentures. Mr. Sarah Atwood  has a past surgical history that includes hx cataract removal; hx lipoma resection (1998); hx angioplasty (1999); hx implantable cardioverter defibrillator (12/18/2011); hx coronary artery bypass graft (x 3, 12/2011); hx orthopaedic (Left); and hx lymph node dissection. Social History/Living Environment:  
Home Environment: Apartment # Steps to Enter: 14 
Rails to Enter:  Yes 
 One/Two Story Residence: Two story(lives on second floor) Living Alone: Yes Support Systems: Friends \ neighbors Patient Expects to be Discharged to[de-identified] Rehabilitation facility Current DME Used/Available at Home: Com[employer], bedside Prior Level of Function/Work/Activity: 
Independent prior to a fall 6 month ago. Number of Personal Factors/Comorbidities that affect the Plan of Care: Expanded review of therapy/medical records (1-2):  MODERATE COMPLEXITY ASSESSMENT OF OCCUPATIONAL PERFORMANCE[de-identified]  
Activities of Daily Living:  
Basic ADLs (From Assessment) Complex ADLs (From Assessment) Feeding: Independent Oral Facial Hygiene/Grooming: Minimum assistance Bathing: Maximum assistance Upper Body Dressing: Moderate assistance Lower Body Dressing: Total assistance Toileting: Total assistance(bed pan, urinal) Grooming/Bathing/Dressing Activities of Daily Living Grooming Washing Hands: Supervision/set-up Cognitive Retraining Safety/Judgement: Awareness of environment; Fall prevention Toileting Toileting Assistance: Supervision/set up Bladder Hygiene: Supervision/set-up Functional Transfers Bathroom Mobility: Supervision/set up Toilet Transfer : Supervision Adaptive Equipment: Jessica Oates ([place]) Bed/Mat Mobility Sit to Stand: Stand-by assistance;Contact guard assistance Bed to Chair: Stand-by assistance;Contact guard assistance Most Recent Physical Functioning:  
Gross Assessment: 
  
         
  
Posture: 
Posture (WDL): Exceptions to [facility] Posture Assessment: Rounded shoulders, Forward head Balance: 
  Bed Mobility: 
  
Wheelchair Mobility: 
  
Transfers: 
Sit to Stand: Stand-by assistance;Contact guard assistance Bed to Chair: Stand-by assistance;Contact guard assistance Patient Vitals for the past 6 hrs: 
 BP BP Patient Position SpO2 Pulse 01/21/19 0730 (!) 185/97 At rest;Sitting 92 % 66  
01/21/19 0926 114/68   83 Mental Status Neurologic State: Alert Orientation Level: Oriented X4 Cognition: Appropriate decision making, Appropriate for age attention/concentration, Appropriate safety awareness, Follows commands Perception: Appears intact Perseveration: No perseveration noted Safety/Judgement: Awareness of environment, Fall prevention Physical Skills Involved: 
1. Balance 2. Strength 3. Activity Tolerance Cognitive Skills Affected (resulting in the inability to perform in a timely and safe manner): 1. maikel Psychosocial Skills Affected: 1. Environmental Adaptation Number of elements that affect the Plan of Care: 3-5:  MODERATE COMPLEXITY CLINICAL DECISION MAKING:  
[facility] AM-PAC 6 Clicks Daily Activity Inpatient Short Form How much help from another person does the patient currently need. .. Total A Lot A Little None 1. Putting on and taking off regular lower body clothing? [x] 1   [] 2   [] 3   [] 4  
2. Bathing (including washing, rinsing, drying)? [] 1   [x] 2   [] 3   [] 4  
3. Toileting, which includes using toilet, bedpan or urinal?   [] 1   [x] 2   [] 3   [] 4  
4. Putting on and taking off regular upper body clothing? [] 1   [x] 2   [] 3   [] 4  
5. Taking care of personal grooming such as brushing teeth? [] 1   [] 2   [x] 3   [] 4  
6. Eating meals? [] 1   [] 2   [] 3   [x] 4  
© 2007, Trustees of [facility], under license to [employer]. All rights reserved Score:  Initial: 14 Most Recent: X (Date: -- ) Interpretation of Tool:  Represents activities that are increasingly more difficult (i.e. Bed mobility, Transfers, Gait). Score 24 23 22-20 19-15 14-10 9-7 6 Modifier CH CI CJ CK CL CM CN   
 
? Self Care:  
  - CURRENT STATUS: CL - 60%-79% impaired, limited or restricted  - GOAL STATUS: CK - 40%-59% impaired, limited or restricted   - D/C STATUS:  ---------------To be determined--------------- 
 Payor: [facility] MEDICARE / Plan: [address] / Product Type: Managed Care Medicare /   
 
Medical Necessity:    
· Patient is expected to demonstrate progress in strength, balance, coordination and functional technique to increase independence with self care. Reason for Services/Other Comments: 
· Patient would benefit from OT to maximize safety and independence with self care and functional mobility . Use of outcome tool(s) and clinical judgement create a POC that gives a: MODERATE COMPLEXITY  
 
 
 
TREATMENT:  
(In addition to Assessment/Re-Assessment sessions the following treatments were rendered) Pre-treatment Symptoms/Complaints:   
Pain: Initial:  
Pain Intensity 1: 0 0 Post Session:  0 Self Care: (15): Procedure(s) (per grid) utilized to improve and/or restore self-care/home management as related to toileting and grooming. Required minimal verbal cueing to facilitate activities of daily living skills and compensatory activities. Braces/Orthotics/Lines/Etc:  
· room air Treatment/Session Assessment:   
· Response to Treatment:  Pt with good understanding of why he is in hospital and the therapy required to get back to his baseline motivated to get stronger to move into his daughters home in February 2019 · Interdisciplinary Collaboration:  
o Occupational Therapist 
o Registered Nurse · After treatment position/precautions:  
o Up in chair 
o Bed/Chair-wheels locked 
o Call light within reach 
o RN notified · Compliance with Program/Exercises: Compliant all of the time, Will assess as treatment progresses. · Recommendations/Intent for next treatment session: \"Next visit will focus on reduction in assistance provided\". Total Treatment Duration: OT Patient Time In/Time Out Time In: 1000 Time Out: 1015  
15 mins Talya Freeman OT

## 2019-01-21 NOTE — PROGRESS NOTES
Shift assessment complete. A&O. No complaints at this time. Respirations even and unlabored. No distress noted. Up in recliner watching tv. Call light within reach.

## 2019-01-21 NOTE — PROGRESS NOTES
Warfarin dosing per pharmacist 
 
Mery Mclean is a 77 y.o. male. @[facility](58)@    @[facility](05)@ Indication:  A fib Goal INR:  2-3 Home dose:  5-10 mg ? Risk factors or significant drug interactions:  none Other anticoagulants:  none Daily Monitoring Date                 INR          Warfarin dose         HGB              Notes 1/10                 1.6                   5 mg                15.2 
1/11                 1.5                   5 mg                14.5  
1/12                 1.2                   5 mg                 14 
1/13                 1.2                   5 mg   
1/14                 1.2                 7.5 mg   
1/15                 1.4                 7.5 mg                13.6 1/16                 1.5                  10 mg 
1/17                 1.4                  12 mg 
1/18                 1.6                  12 mg 
1/19                 2.0                  12 mg 
1/20                 2.3                  12 mg       
1/21                 2.9                  10 mg                13.2 INR remains therapeutic today. INR increased by 0.6 from yesterday. Due to significant increase in INR, will decrease dose back down to 10 mg daily, starting today (in anticipation of further increase). Pharmacy will continue to follow closely. Thank you, Yulissa Abraham, PharmD

## 2019-01-21 NOTE — DISCHARGE INSTRUCTIONS
Patient Education        Learning About Hypoxemia  What is hypoxemia? Hypoxemia means that you don't have enough oxygen in your blood. It's a result of diseases that affect your heart or lungs. These include heart failure, COPD, and pulmonary fibrosis (scarring of the lungs). Being at high altitudes can also lead to hypoxemia. What happens when you have hypoxemia? Oxygen gets into your blood through your lungs. Your blood carries the oxygen to all parts of your body. When you have too little oxygen in your blood, your body doesn't get enough of it. With too little oxygen, your heart and other parts of your body don't work very well. What are the symptoms? In addition to the symptoms of whatever is causing your hypoxemia, you may:  · Get tired quickly. · Be short of breath when you are active. · Feel like your heart is pounding or racing. · Feel weak or dizzy. · Become confused. How is hypoxemia treated? Your doctor will do tests to find out how much oxygen is in your blood. He or she will look for the cause of your hypoxemia and treat that problem. For example, if you have heart failure, you may need medicines that help your heart pump better. · If your hypoxemia is not severe, your doctor may give you oxygen through a mask or nasal cannula (say \"MESHA-anicetoh-cosme\"). A cannula is a thin tube with two openings that fit just inside your nose. · If your hypoxemia is severe, you may have a breathing tube put into your windpipe. The breathing tube is attached to a machine that pushes air into your lungs. This machine is called a ventilator. · If you have a long-term problem with hypoxemia, your doctor may recommend that you use oxygen regularly. Some people need it all the time. Others need it from time to time throughout the day or overnight. Your doctor will tell you how much oxygen you need and how often to use it. Follow-up care is a key part of your treatment and safety.  Be sure to make and go to all appointments, and call your doctor if you are having problems. It's also a good idea to know your test results and keep a list of the medicines you take. Where can you learn more? Go to http://marlyn-socorro.info/. Enter M375 in the search box to learn more about \"Learning About Hypoxemia. \"  Current as of: September 5, 2018  Content Version: 11.9  © 8562-2024 [employer]. Care instructions adapted under license by [employer] (which disclaims liability or warranty for this information). If you have questions about a medical condition or this instruction, always ask your healthcare professional. [address] any warranty or liability for your use of this information.

## 2019-01-21 NOTE — PROGRESS NOTES
Madeline Bueno Admission Date: 1/10/2019 Daily Progress Note: 1/21/2019 The patient's chart is reviewed and the patient is discussed with the staff. 
 
78 y.o.  male  evaluated at the request of Dr. Carrillo Eckert. Pt has a history of coronary artery disease, ischemic cardipmyopathy, hypertension, factor V Leiden, CABG, AICD for nonsustained V. tach, phlebitis, DVT, paroxysmal atrial fibrillation, aortic stenosis presents from his primary care office for pneumonia.  
Pt does have chronic sob on exertion since cabg- several years ago. Since past 2 weeks- nasal congestion,increased sob-- no fever or headache or dizziness or chest pain. Went to pcp1/10 for his INR check- daughter complained that pt was looking ill- wanted pcp to evaluate- found to be hypoxic - oxygen sat 83%- was given breathing treatment- was told that has pneumonia- sent to er for further evaluation. Subjective:  
 
Up in chair on RA and in no distress. Did not wear CPAP/APAP last night. Says mask is the issue. Described new mask models which do not come up over nose but under the nose and cover the mouth. He thinks he can tolerate it. Current Facility-Administered Medications Medication Dose Route Frequency  furosemide (LASIX) tablet 40 mg  40 mg Oral ACB&D  warfarin (COUMADIN) tablet 12 mg  12 mg Oral QPM  
 albuterol-ipratropium (DUO-NEB) 2.5 MG-0.5 MG/3 ML  3 mL Nebulization QID RT  
 pantoprazole (PROTONIX) tablet 40 mg  40 mg Oral ACB  calcium carbonate (TUMS) chewable tablet 200 mg [elemental]  200 mg Oral TID WITH MEALS  nystatin (MYCOSTATIN) 100,000 unit/gram powder   Topical BID  influenza vaccine 2018-19 (6 mos+)(PF) (FLUARIX QUAD/FLULAVAL QUAD) injection 0.5 mL  0.5 mL IntraMUSCular PRIOR TO DISCHARGE  sodium chloride (NS) flush 10 mL  10 mL InterCATHeter Q8H  
 bisacodyl (DULCOLAX) tablet 10 mg  10 mg Oral QHS  docusate sodium (COLACE) capsule 400 mg  400 mg Oral QHS  amLODIPine (NORVASC) tablet 10 mg  10 mg Oral DAILY  aspirin delayed-release tablet 81 mg  81 mg Oral DAILY  carvedilol (COREG) tablet 25 mg  25 mg Oral BID WITH MEALS  guaiFENesin ER (MUCINEX) tablet 1,200 mg  1,200 mg Oral BID  
 HYDROcodone-acetaminophen (NORCO)  mg tablet 1 Tab  1 Tab Oral Q6H PRN  
 lisinopril (PRINIVIL, ZESTRIL) tablet 20 mg  20 mg Oral DAILY  pravastatin (PRAVACHOL) tablet 40 mg  40 mg Oral QHS  sodium chloride (NS) flush 5-40 mL  5-40 mL IntraVENous PRN  
 acetaminophen (TYLENOL) tablet 650 mg  650 mg Oral Q4H PRN  
 morphine injection 1 mg  1 mg IntraVENous Q4H PRN  
 naloxone (NARCAN) injection 0.4 mg  0.4 mg IntraVENous PRN  
 ondansetron (ZOFRAN) injection 4 mg  4 mg IntraVENous Q4H PRN  
 hydrALAZINE (APRESOLINE) 20 mg/mL injection 10 mg  10 mg IntraVENous Q6H PRN  
 loratadine (CLARITIN) tablet 10 mg  10 mg Oral DAILY Review of Systems Constitutional: negative for fever, chills, sweats Cardiovascular: + LE edema. negative for chest pain, palpitations, syncope Gastrointestinal: negative for dysphagia, reflux, vomiting, diarrhea, abdominal pain, or melena Neurologic: negative for focal weakness, numbness, headache Objective:  
 
Vitals:  
 01/21/19 0018 01/21/19 0307 01/21/19 0505 01/21/19 0730 BP: 114/72 101/64  (!) 185/97 Pulse: 63 62  66 Resp: 18 18  18 Temp: 98 °F (36.7 °C) 98.1 °F (36.7 °C)  97.3 °F (36.3 °C) SpO2: 93% 92%  92% Weight:   306 lb 6.4 oz (139 kg) Height:      
 
Intake and Output:  
01/19 1901 - 01/21 0700 In: -  
Out: 400 [Urine:400] 01/21 0701 - 01/21 1900 In: -  
Out: 200 [Urine:200] Physical Exam:  
Constitution:  the patient is obese and in no acute distress on RA 
EENMT:  Sclera clear, pupils equal, oral mucosa moist 
Respiratory: CTA B, no w/r/r 
Cardiovascular:  RRR without M,G,R 
 Gastrointestinal: soft and non-tender; with positive bowel sounds. Musculoskeletal: warm without cyanosis. There is 2+ B lower leg edema. Skin:  no jaundice or rashes, old, healed LLE wounds Neurologic: no gross neuro deficits Psychiatric:  alert and oriented x  3 CHEST XRAY: 1/17: low lung volumes, atelecatsis, no effusion LAB No lab exists for component: [place] Recent Labs  
  01/21/19 
0557 01/21/19 
0356 01/20/19 
0529 01/19/19 
3476 HGB  --  13.2*  --   --   
INR 2.9  --  2.3 2.0 Recent Labs  
  01/20/19 
0529 01/19/19 
8949  139  
K 4.3 4.6  105 CO2 27 28 * 183* BUN 38* 46* CREA 1.16 1.22  
CA 8.1* 7.9* ABG:   
No results found for: PH, PHI, PCO2, PCO2I, PO2, PO2I, HCO3, HCO3I, FIO2, FIO2I Assessment:  (Medical Decision Making) Hospital Problems  Date Reviewed: 10/2/2018 Codes Class Noted POA Acute respiratory failure with hypoxia and hypercapnia (HCC) ICD-10-CM: J96.01, J96.02 
ICD-9-CM: 518.81  1/17/2019 Unknown Weaned to RA. Won't use CPAP here but agreeable with a different mask that is not available in the hospital.  
 Obesity hypoventilation syndrome ([facility] [address] ICD-10-CM: Y97.9 ICD-9-CM: 278.03  1/14/2019 Unknown No significant hypoventilation on last ABG on 2L  
 COPD exacerbation (HCC) ICD-10-CM: J44.1 ICD-9-CM: 491.21  1/11/2019 Unknown No wheezing Hyperkalemia ICD-10-CM: E87.5 ICD-9-CM: 276.7  1/10/2019 Unknown * (Principal) PNA (pneumonia) ICD-10-CM: J18.9 ICD-9-CM: 889  1/10/2019 Unknown Completed Rx Essential hypertension ICD-10-CM: I10 
ICD-9-CM: 401.9  2/6/2017 Yes On amlodipine which is contributing to edema. Chronic systolic congestive heart failure (HCC) ICD-10-CM: I50.22 ICD-9-CM: 428.22, 428.0  10/27/2015 Yes Needs chronic diuresis. AICD (automatic cardioverter/defibrillator) present ICD-10-CM: Z95.810 ICD-9-CM: V45.02  10/27/2015 Yes Paroxysmal atrial fibrillation (HCC) ICD-10-CM: I48.0 ICD-9-CM: 427.31  10/27/2015 Yes Aortic stenosis, mild to moderate ICD-10-CM: I35.0 ICD-9-CM: 424.1  10/27/2015 Yes Chronic back pain (Chronic) ICD-10-CM: M54.9, G89.29 ICD-9-CM: 724.5, 338.29  2/12/2015 Yes Obesity (Chronic) ICD-10-CM: O68.2 ICD-9-CM: 278.00  8/17/2012 Yes Needs weight loss. Coronary artery disease (Chronic) ICD-10-CM: I25.10 ICD-9-CM: 414.00  8/17/2012 Yes Overview Signed 8/17/2012  3:57 PM by Jarred Jordan Diagnosed 1999, CABG 12/2011 Cardiomyopathy, ischemic (Chronic) ICD-10-CM: I25.5 ICD-9-CM: 414.8  8/17/2012 Yes Overview Signed 8/17/2012  3:58 PM by Jarred Jordan Pacemaker AICD 12/2011,  35% LUEF Factor 5 Leiden mutation, heterozygous ([facility] [address] (Chronic) ICD-10-CM: P48.13 
ICD-9-CM: 289.81  8/17/2012 Yes Overview Signed 8/17/2012  4:02 PM by Jarred Jordan Chronic anticoag., hx DVT Hyperlipidemia (Chronic) ICD-10-CM: C36.1 ICD-9-CM: 272.4  8/17/2012 Yes Plan:  (Medical Decision Making) Define O2 needs for rehab. Diurese. Needs follow up in sleep center. PSG can be arranged from that visit. Recommend CPAP titrration with Respironics Dreamwear FFM or Resmed Airfit F30. Nothing further to add. Will sign off.   
 
 
Hari Thomas MD

## 2019-01-21 NOTE — PROGRESS NOTES
Care Management Interventions PCP Verified by CM: Yes Mode of Transport at Discharge: BLS Transition of Care Consult (CM Consult): SNF Partner SNF: Yes Current Support Network: Own Home Confirm Follow Up Transport: Family Plan discussed with Pt/Family/Caregiver: Yes Freedom of Choice Offered: Yes Discharge Location Discharge Placement: Skilled nursing facility Pt medically ready and has ins. Approval to go to [facility].  Pt aware, RN given # to call report, Rm 20 P, any nicolas for  @ 1pm.

## 2019-01-21 NOTE — DISCHARGE SUMMARY
Hospitalist Discharge Summary     Admit Date:  1/10/2019  4:02 PM   Name:  Franchesca Wells   Age:  77 y.o.  :  1940   MRN:  [id number]   PCP:  Al Genao MD  Treatment Team: Attending Provider: Nikolas Lopez MD; Consulting Provider: Loni Brown MD; Utilization Review: Carolee Olivares RN; Hospitalist: Nikolas Lopez MD    Problem List for this Hospitalization:  Hospital Problems as of 2019 Date Reviewed: 10/2/2018          Codes Class Noted - Resolved POA    Obesity hypoventilation syndrome ([facility] ICD-10-CM: A90.3  ICD-9-CM: 278.03  2019 - Present Yes        Essential hypertension ICD-10-CM: I10  ICD-9-CM: 401.9  2017 - Present Yes        Chronic systolic congestive heart failure ([facility] ICD-10-CM: I50.22  ICD-9-CM: 428.22, 428.0  10/27/2015 - Present Yes        AICD (automatic cardioverter/defibrillator) present ICD-10-CM: Z95.810  ICD-9-CM: V45.02  10/27/2015 - Present Yes        Paroxysmal atrial fibrillation ([facility] ICD-10-CM: I48.0  ICD-9-CM: 427.31  10/27/2015 - Present Yes        Aortic stenosis, mild to moderate ICD-10-CM: I35.0  ICD-9-CM: 424.1  10/27/2015 - Present Yes        Osteoarthritis (Chronic) ICD-10-CM: M19.90  ICD-9-CM: 715.90  2015 - Present     Overview Signed 10/27/2015  9:58 AM by Tano Augustin     With severe chronic low back pain.              Chronic back pain (Chronic) ICD-10-CM: M54.9, G89.29  ICD-9-CM: 724.5, 338.29  2015 - Present Yes        Obesity (Chronic) ICD-10-CM: E66.9  ICD-9-CM: 278.00  2012 - Present Yes        Coronary artery disease (Chronic) ICD-10-CM: I25.10  ICD-9-CM: 414.00  2012 - Present Yes    Overview Signed 2012  3:57 PM by Timo Fails     Diagnosed , CABG 2011             Cardiomyopathy, ischemic (Chronic) ICD-10-CM: I25.5  ICD-9-CM: 414.8  2012 - Present Yes    Overview Signed 2012  3:58 PM by Timo Fails     Pacemaker AICD 2011,  35% LUEF             Factor 5 Leiden mutation, heterozygous [facility]) (Chronic) ICD-10-CM: D68.51  ICD-9-CM: 289.81  8/17/2012 - Present Yes    Overview Signed 8/17/2012  4:02 PM by Charla Oliver anticoag., hx DVT             Hyperlipidemia (Chronic) ICD-10-CM: E78.5  ICD-9-CM: 272.4  8/17/2012 - Present Yes        RESOLVED: Acute respiratory failure with hypoxia and hypercapnia (HCC) ICD-10-CM: J96.01, J96.02  ICD-9-CM: 518.81  1/17/2019 - 1/21/2019 Yes        RESOLVED: COPD exacerbation ([address] ICD-10-CM: J44.1  ICD-9-CM: 491.21  1/11/2019 - 1/21/2019 Unknown        RESOLVED: Hyperkalemia ICD-10-CM: E87.5  ICD-9-CM: 276.7  1/10/2019 - 1/21/2019 Yes        * (Principal) RESOLVED: PNA (pneumonia) ICD-10-CM: J18.9  ICD-9-CM: 486  1/10/2019 - 1/21/2019 Unknown                Admission HPI from 1/10/2019:    74-year-old male history of coronary artery disease, ischemic cardipmyopathy, hypertension, factor V Leiden, CABG, AICD for nonsustained V. tach, phlebitis, DVT, paroxysmal atrial fibrillation, aortic stenosis presents from his primary care office for pneumonia. Pt does have chronic sob on exertion since cabg- several years ago. Since past 2 weeks- nasal congestion,increased sob-- no fever or headache or dizziness or chest pain. Went to pcp today for his INR check- daughter complained that pt was looking ill- wanted pcp to evaluate- found to be hypoxic - oxygen sat 83%- was given breathing treatment- was told that has pneumonia- sent to er for further evaluation.     In er found to have oxygen saturation 89- cxr- cardiomegaly with infiltrate- started in rocephin and zithromax. Mild elevated d-dimer- ordered ct chest with contrast - pending.     Pt will be admitted for acute on chronic sob-secondary to pneumonia. Hospital Course:  76 y/o WM with multiple medical problems admitted on 1/10 with acute respiratory failure secondary to pneumonia and likely superimposed/untreated OHS/JACOB. Chest CT was negative for PE.  He was treated with antibiotics for CAP. Pulmonary was consulted and patient started on steroids and Bipap at night, which he initially tolerated. He was transferred to the floor and transitioned to APAP at night, but then did not tolerate his mask and would only wear a few hours at a time. His oxygen was weaned. His IV lasix was changed to PO, although he still has peripheral edema. TTE was unchanged from prior studies ,LVEF 40-45% and grade 2 DD. INR was subtherapeutic so he was bridged with SQ lovenox while his INR became normal. Warfarin required frequent dose adjustments. At discharge INR is 2.9 and he should have a repeat done in the next 1-2 days. His warfarin will be adjusted to 7.5mg daily (his dose went as high as 12mg for two doses). Patient improved and is stable for discharge to [facility] rehab. He needs outpatient follow up with [facility] sleep center with CPAP titration. Follow up instructions and discharge meds at bottom of this note. Plan was discussed with patient, case management, nursing. All questions answered. Patient was stable at time of discharge. Diagnostic Imaging/Tests:   Xr Chest Sngl V    Result Date: 1/10/2019  Chest single view 1/10/2019 CLINICAL HISTORY: Worsening shortness of breath and congestion for 2 weeks. FINDINGS: A request was made to only perform and interpret a lateral view of the chest. On this lateral view, there is a suggestion of a positive spine sign suggesting a basilar infiltrate which could reside on the left or right. No significant layering pleural effusion is seen. The cardiac silhouette does appear at least mildly enlarged. No clear pneumothorax is seen although this would be best appreciated on the frontal view. IMPRESSION: 1. Cardiomegaly and potential basilar infiltrate suggested on this lateral view. Ct Chest W Cont    Result Date: 1/10/2019  CT OF THE CHEST WITH INTRAVENOUS CONTRAST. INDICATION: Shortness of breath. COMPARISON: None.  TECHNIQUE: 2.5 mm axial scans from above the aortic arch to the lung bases with 100 cc nonionic intravenous contrast without acute complication. Intravenous contrast was given to evaluate for pulmonary embolism. FINDINGS:  The degree of opacification of the pulmonary arteries is adequate. No intraluminal filling defects within the pulmonary arterial tree to suggest pulmonary embolism. Aorta is normal caliber with a uniform lumen without evidence of dissection. Lungs demonstrates patchy densities in the bases. . Included portion of the upper abdomen unremarkable. Small hiatal hernia suspected. There are sternal wires and aortic calcifications. Left-sided cardiac pacemaker is present. .     IMPRESSION:  Negative for pulmonary embolism. Small hiatal hernia. Left-sided cardiac pacemaker     Amb Poc Xray Chest 2 Views    AP done showing pacemaker in place, Marked cardiomegaly,  Possible infiltrate in JODY area, no obvious effusion      Echocardiogram results:  Results for orders placed or performed during the hospital encounter of 01/10/19   2D ECHO COMPLETE ADULT (TTE) W OR [address] [facility] [address] [facility], [address]  [phone]    Transthoracic Echocardiogram  2D, M-mode, Doppler, and Color Doppler    Patient: Marcell Zuñiga  MR #: [id number]  : 1940  Age: 77 years  Gender: Male  Study date: 2019  Account #: [de-identified]  Height: 72 in  Weight: 278.5 lb  BSA: 2.45 mï¾²  Status:Routine  Location: [facility]  BP: 105/ 59    Allergies: NO KNOWN ALLERGENS    Sonographer:  Iris Tierney RDCS  Group:  [facility]  Referring Physician:  Epi Calvo MD  Reading Physician:  Be Malin MD [facility]    INDICATIONS: CHF    PROCEDURE: This was a routine study. A transthoracic echocardiogram was  performed. The study included complete 2D imaging, M-mode, complete spectral  Doppler, and color Doppler.  Intravenous contrast (Definity, 3 ml) was  administered. Echocardiographic views were limited by poor acoustic window  availability and poor body habitus. This was a technically difficult study. LEFT VENTRICLE: Size was at the upper limits of normal. Systolic function was  mildly to moderately reduced. Ejection fraction was estimated in the range of  40 % to 45 %. There was moderate hypokinesis of the basal inferoseptal   wall(s). There was akinesis of the basal-mid inferior wall(s). There was moderate  concentric hypertrophy. Left ventricular diastolic dysfunction Grade 2. Average  E/e' of 26.7. RIGHT VENTRICLE: The ventricle was dilated. Systolic function was low normal.   A  pacing wire was present. LEFT ATRIUM: The atrium was moderately to markedly dilated. RIGHT ATRIUM: Not well visualized. SYSTEMIC VEINS: IVC: The inferior vena cava was dilated. The respirophasic  change in diameter was less than 50%. AORTIC VALVE: The valve was trileaflet. DI: 0.43. The aortic valve area by   the  continuity equation was 1.46 cm2. The peak velocity was 3.24 m/s. The mean  pressure gradient was 19 mmHg. The peak pressure gradient was 42 mmHg. There  was no insufficiency. MITRAL VALVE: Valve structure was normal. There was no evidence for stenosis. There was trivial regurgitation. TRICUSPID VALVE: The valve structure was normal. There was no evidence for  stenosis. There was trivial regurgitation. PULMONIC VALVE: Not well visualized. PERICARDIUM: There was no pericardial effusion. AORTA: The root exhibited normal size. SUMMARY:    -  Left ventricle: Size was at the upper limits of normal. Systolic function  was mildly to moderately reduced. Ejection fraction was estimated in the   range  of 40 % to 45 %. There was moderate hypokinesis of the basal inferoseptal  wall(s). There was akinesis of the basal-mid inferior wall(s). There was  moderate concentric hypertrophy.    -  Right ventricle: The ventricle was dilated.     -  Left atrium: The atrium was moderately to markedly dilated. -  Inferior vena cava, hepatic veins: The inferior vena cava was dilated. The  respirophasic change in diameter was less than 50%. -  Aortic valve: The aortic valve area by the continuity equation was 1.46   cm2. SYSTEM MEASUREMENT TABLES    2D mode  Left Atrium Systolic Volume Index; Method of Disks, Biplane; 2D mode;: 55.9  ml/m2  IVS/LVPW (2D): 0.9  IVSd (2D): 1.9 cm  LVIDd (2D): 5.5 cm  LVIDs (2D): 5 cm  LVOT Area (2D): 4.5 cm2  LVPWd (2D): 2 cm  RVIDd (2D): 3.7 cm    Unspecified Scan Mode  Peak Grad; Mean; Antegrade Flow: 38 mm[Hg]  Vmax; Antegrade Flow: 292 cm/s  LVOT Diam: 2.4 cm    Prepared and signed by    Emy Ivey. Alecia Zayas MD [facility]  Signed 14-Jan-2019 16:10:12         Procedures done this admission:  * No surgery found *    All Micro Results     None          Labs: Results:       BMP, Mg, Phos Recent Labs     01/20/19  0529 01/19/19  0709    139   K 4.3 4.6    105   CO2 27 28   AGAP 6 6   BUN 38* 46*   CREA 1.16 1.22   CA 8.1* 7.9*   * 183*      CBC Recent Labs     01/21/19  0356   HGB 13.2*      LFT No results for input(s): SGOT, ALT, TBIL, AP, TP, ALB, GLOB, AGRAT, GPT in the last 72 hours.    Cardiac Testing Lab Results   Component Value Date/Time     01/15/2019 06:32 AM     01/12/2019 03:47 AM    BNP 98 01/10/2019 05:11 PM      Coagulation Tests Lab Results   Component Value Date/Time    Prothrombin time 30.5 (H) 01/21/2019 05:57 AM    Prothrombin time 25.5 (H) 01/20/2019 05:29 AM    Prothrombin time 23.3 (H) 01/19/2019 07:09 AM    INR 2.9 01/21/2019 05:57 AM    INR 2.3 01/20/2019 05:29 AM    INR 2.0 01/19/2019 07:09 AM    aPTT 30.7 02/09/2015 12:23 PM      A1c Lab Results   Component Value Date/Time    Hemoglobin A1c 6.2 (H) 08/22/2017 10:14 AM      Lipid Panel Lab Results   Component Value Date/Time    Cholesterol, total 131 12/07/2016 02:29 PM    HDL Cholesterol 31 (L) 12/07/2016 02:29 PM    LDL, calculated 64 12/07/2016 02:29 PM    VLDL, calculated 36 12/07/2016 02:29 PM    Triglyceride 178 12/07/2016 02:29 PM    CHOL/HDL Ratio 4.2 12/07/2016 02:29 PM      Thyroid Panel Lab Results   Component Value Date/Time    TSH 0.619 01/15/2019 06:32 AM        Most Recent UA Lab Results   Component Value Date/Time    Color YELLOW 02/09/2015 12:20 PM    Appearance CLEAR 02/09/2015 12:20 PM    pH (UA) 5.5 02/09/2015 12:20 PM    Protein NEGATIVE  02/09/2015 12:20 PM    Glucose NEGATIVE  02/09/2015 12:20 PM    Ketone NEGATIVE  02/09/2015 12:20 PM    Bilirubin NEGATIVE  02/09/2015 12:20 PM    Blood NEGATIVE  02/09/2015 12:20 PM    Urobilinogen 0.2 02/09/2015 12:20 PM    Nitrites NEGATIVE  02/09/2015 12:20 PM    Leukocyte Esterase NEGATIVE  02/09/2015 12:20 PM        No Known Allergies  Immunization History   Administered Date(s) Administered    Influenza High Dose Vaccine PF 09/14/2016    Influenza Vaccine 10/01/2014    Influenza Vaccine (Quad) Mdck Pf 09/21/2017    Influenza Vaccine PF 09/29/2015    Influenza Vaccine Whole 11/03/2011    Pneumococcal Conjugate (PCV-13) 08/22/2017    Pneumococcal Vaccine (Pcv) 10/01/2005, 10/20/2010    TB Skin Test (PPD) Intradermal 02/26/2015, 01/11/2019       All Labs from Last 24 Hrs:  Recent Results (from the past 24 hour(s))   HEMOGLOBIN    Collection Time: 01/21/19  3:56 AM   Result Value Ref Range    HGB 13.2 (L) 13.6 - 17.2 g/dL   PROTHROMBIN TIME + INR    Collection Time: 01/21/19  5:57 AM   Result Value Ref Range    Prothrombin time 30.5 (H) 11.7 - 14.5 sec    INR 2.9         Current Med List in Hospital:   Current Facility-Administered Medications   Medication Dose Route Frequency    warfarin (COUMADIN) tablet 10 mg  10 mg Oral QPM    furosemide (LASIX) tablet 40 mg  40 mg Oral ACB&D    albuterol-ipratropium (DUO-NEB) 2.5 MG-0.5 MG/3 ML  3 mL Nebulization QID RT    pantoprazole (PROTONIX) tablet 40 mg  40 mg Oral ACB    calcium carbonate (TUMS) chewable tablet 200 mg [elemental] 200 mg Oral TID WITH MEALS    nystatin (MYCOSTATIN) 100,000 unit/gram powder   Topical BID    influenza vaccine 2018-19 (6 mos+)(PF) (FLUARIX QUAD/FLULAVAL QUAD) injection 0.5 mL  0.5 mL IntraMUSCular PRIOR TO DISCHARGE    sodium chloride (NS) flush 10 mL  10 mL InterCATHeter Q8H    bisacodyl (DULCOLAX) tablet 10 mg  10 mg Oral QHS    docusate sodium (COLACE) capsule 400 mg  400 mg Oral QHS    amLODIPine (NORVASC) tablet 10 mg  10 mg Oral DAILY    aspirin delayed-release tablet 81 mg  81 mg Oral DAILY    carvedilol (COREG) tablet 25 mg  25 mg Oral BID WITH MEALS    guaiFENesin ER (MUCINEX) tablet 1,200 mg  1,200 mg Oral BID    HYDROcodone-acetaminophen (NORCO)  mg tablet 1 Tab  1 Tab Oral Q6H PRN    lisinopril (PRINIVIL, ZESTRIL) tablet 20 mg  20 mg Oral DAILY    pravastatin (PRAVACHOL) tablet 40 mg  40 mg Oral QHS    sodium chloride (NS) flush 5-40 mL  5-40 mL IntraVENous PRN    acetaminophen (TYLENOL) tablet 650 mg  650 mg Oral Q4H PRN    morphine injection 1 mg  1 mg IntraVENous Q4H PRN    naloxone (NARCAN) injection 0.4 mg  0.4 mg IntraVENous PRN    ondansetron (ZOFRAN) injection 4 mg  4 mg IntraVENous Q4H PRN    hydrALAZINE (APRESOLINE) 20 mg/mL injection 10 mg  10 mg IntraVENous Q6H PRN    loratadine (CLARITIN) tablet 10 mg  10 mg Oral DAILY       Discharge Exam:  Patient Vitals for the past 24 hrs:   Temp Pulse Resp BP SpO2   01/21/19 0926  83  114/68    01/21/19 0730 97.3 °F (36.3 °C) 66 18 (!) 185/97 92 %   01/21/19 0307 98.1 °F (36.7 °C) 62 18 101/64 92 %   01/21/19 0018 98 °F (36.7 °C) 63 18 114/72 93 %   01/20/19 2040     94 %   01/20/19 1947 97.5 °F (36.4 °C) 67 18 121/61 93 %   01/20/19 1710 98 °F (36.7 °C) 72 18 116/67 94 %   01/20/19 1650     94 %     Oxygen Therapy  O2 Sat (%): 92 % (01/21/19 0730)  Pulse via Oximetry: 64 beats per minute (01/20/19 2040)  O2 Device: Room air (01/21/19 1119)  O2 Flow Rate (L/min): 0 l/min (01/18/19 1633)  O2 Temperature: (.) (01/15/19 2127)  FIO2 (%): 21 % (01/19/19 2146)    Intake/Output Summary (Last 24 hours) at 1/21/2019 1304  Last data filed at 1/21/2019 0818  Gross per 24 hour   Intake    Output 200 ml   Net -200 ml       *Note that automatically entered I/Os may not be accurate; dependent on patient compliance with collection and accurate  by assistants. General:    Well nourished. Alert. Obese. Eyes:   Normal sclera. Extraocular movements intact. ENT:  Normocephalic, atraumatic. Moist mucous membranes  CV:   Regular rate and rhythm. No murmur, rub, or gallop. Lungs:  Clear to auscultation bilaterally. No wheezing, rhonchi, or rales. Abdomen: Soft, nontender, nondistended. Bowel sounds normal.   Extremities: Warm and dry. 3+ pitting edema b/l LEs. Neurologic: CN II-XII grossly intact. Sensation intact. Skin:     No rashes or jaundice. Psych:  Normal mood and affect. Discharge Info:   Current Discharge Medication List      CONTINUE these medications which have CHANGED    Details   warfarin (COUMADIN) 7.5 mg tablet Take 1 Tab by mouth daily. Qty: 30 Tab, Refills: 0    Associated Diagnoses: Factor 5 Leiden mutation, heterozygous ([facility]      furosemide (LASIX) 40 mg tablet Take 1 Tab by mouth two (2) times a day. Qty: 60 Tab, Refills: 0         CONTINUE these medications which have NOT CHANGED    Details   HYDROcodone-acetaminophen (NORCO)  mg tablet Take one tablet by mouth every 4 to 6 hours prn for pain.   Qty: 135 Tab, Refills: 0    Associated Diagnoses: Chronic back pain, unspecified back location, unspecified back pain laterality      amLODIPine (NORVASC) 10 mg tablet TAKE 1 TABLET BY MOUTH ONCE DAILY  Qty: 90 Tab, Refills: 3      pravastatin (PRAVACHOL) 40 mg tablet TAKE 1 TABLET BY MOUTH EVERY EVENING  Qty: 90 Tab, Refills: 3      carvedilol (COREG) 25 mg tablet TAKE 1 TABLET BY MOUTH TWICE DAILY WITH MEALS  Qty: 180 Tab, Refills: 3      lisinopril (PRINIVIL, ZESTRIL) 20 mg tablet TAKE 1 TABLET BY MOUTH TWICE DAILY FOR HIGH BLOOD PRESSURE  Qty: 180 Tab, Refills: 3      guaiFENesin (MUCINEX) 1,200 mg tp12 ER tablet Take 1,200 mg by mouth two (2) times a day. MULTIVITS-MINERALS/FA/LYCOPENE (MEN'S DAILY PO) Take 1 Tab by mouth daily. aspirin 81 mg tablet Take 81 mg by mouth. TAKE AM OF SURGERY WITH SMALL SIP OF WATER               Disposition: [facility]  Activity: Activity as tolerated  Diet: DIET CARDIAC Regular    No orders of the defined types were placed in this encounter. Follow-up Information     Follow up With Specialties Details Why Contact Info    Bette Boateng MD [facility]   [phone] follow up, sleep center -- OHS/JACOB, CPAP titration [place] [phone]          Time spent in patient discharge planning and coordination 35 minutes.     Signed:  Carolee Loya MD

## 2019-01-22 ENCOUNTER — PATIENT OUTREACH (OUTPATIENT)
Dept: CASE MANAGEMENT | Age: 79
End: 2019-01-22

## 2019-01-22 NOTE — PROGRESS NOTES
This note will not be viewable in [address] Patient discharged to [facility] on 1/21/19. JOEL outreach postponed for 21 days due to discharge to non-preferred network SNF.

## 2019-02-12 ENCOUNTER — PATIENT OUTREACH (OUTPATIENT)
Dept: CASE MANAGEMENT | Age: 79
End: 2019-02-12

## 2019-02-12 NOTE — PROGRESS NOTES
This note will not be viewable in [address] Initial JOEL outreach without success to patient home number. Verified with staff at [facility] patient remains current admission, no tentative discharge date at this time. JOEL not indicated at this time.

## 2019-02-17 NOTE — PROGRESS NOTES
Wound Center Progress Note Patient: Bernardo Rivas MRN: [id number]  SSN: [id number] YOB: 1940  Age: 77 y.o. Sex: male Subjective: Chief Complaint: 
Left knee wound History of Present Illness:    
Left leg contusion s/p fall Wound Caused By: Contusion, anticoagulation, lagos lesion Associated Signs and Symptoms: Initial drainage, pain Timing: constant wound since June 2 Quality: wound Severity: full thickness Modifying Factors: CHF, LE edema Current Wound Care: dressings and wound vac Wraps being tolerated well but fell down since last.  
 
9/28/2018 Continues to do well with wraps. Denies any changes, new issues. Tolerating well. No drainage. Past Medical History:  
Diagnosis Date  AICD (automatic cardioverter/defibrillator) present 10/27/2015  Aortic stenosis, mild to moderate 10/27/2015  Cardiomyopathy, ischemic 8/17/2012  Carotid occlusion, bilateral 8/20/2012  Chronic back pain 2/12/2015  Chronic rhinitis 10/27/2015  Chronic systolic congestive heart failure ([address] 10/27/2015 NYHA class 2  
 Coronary artery disease 8/17/2012  DVT, lower extremity, recurrent ([address] 1994 and 2003 - on chronic coumadin  Factor 5 Leiden mutation, heterozygous ([address] 8/17/2012  Heart attack ([address] \"I had a heart attack the day I was being discharged from my triple by-pass\"  History of alcoholism ([address] quit age 34  
 History of complete eye exam 10/2016  Hx of smoking quit after 19  Hyperlipidemia 8/17/2012  Hypertension 8/17/2012  NSVT (nonsustained ventricular tachycardia) (HCC)  Obesity 8/17/2012  Osteoarthritis 2/12/2015  Pacemaker  Paroxysmal atrial fibrillation ([address] 10/27/2015  Phlebitis and thrombophlebitis 1995, 2004  
 due to Factor 5 Leiden  S/P total knee arthroplasty 2/26/2015  Tobacco abuse 2/12/2015  Wears dentures Past Surgical History:  
Procedure Laterality Date [address]  HX CATARACT REMOVAL    
 iop  HX CORONARY ARTERY BYPASS GRAFT  x 3, 2011  
 in [place]  HX IMPLANTABLE CARDIOVERTER DEFIBRILLATOR  2011 +Defibrilator placement [facility]  HX LYMPH NODE DISSECTION Lymph node bx/removal from back  HX ORTHOPAEDIC Left Knee surgery Family History Problem Relation Age of Onset  Heart Disease Mother   
      CHF age 71  
 Other Father   
     blood clot  age 76  Cancer Sister 68 Pancreatic  Cancer Brother Skin  Heart Disease Brother Heart Valve Social History Tobacco Use  Smoking status: Former Smoker Packs/day: 1.00 Years: 20.00 Pack years: 20.00 Last attempt to quit: 1972 Years since quittin.1  Smokeless tobacco: Never Used Substance Use Topics  Alcohol use: No  
  Comment: quit at the age 34 Prior to Admission medications Medication Sig Start Date End Date Taking? Authorizing Provider  
lisinopril (PRINIVIL, ZESTRIL) 20 mg tablet TAKE 1 TABLET BY MOUTH TWICE DAILY FOR HIGH BLOOD PRESSURE 2/15/19   Cheryle Amato, MD  
warfarin (COUMADIN) 7.5 mg tablet Take 1 Tab by mouth daily. 19   Shiraz Bear MD  
furosemide (LASIX) 40 mg tablet Take 1 Tab by mouth two (2) times a day. 19   Shiraz Bear MD  
HYDROcodone-acetaminophen [facility])  mg tablet Take one tablet by mouth every 4 to 6 hours prn for pain. 19   Cheryle Amato, MD  
amLODIPine (NORVASC) 10 mg tablet TAKE 1 TABLET BY MOUTH ONCE DAILY 18   Cheryle Amato, MD  
pravastatin (PRAVACHOL) 40 mg tablet TAKE 1 TABLET BY MOUTH EVERY EVENING 18   Cheryle Amato, MD  
carvedilol (COREG) 25 mg tablet TAKE 1 TABLET BY MOUTH TWICE DAILY WITH MEALS 18   Cheryle Amato, MD  
guaiFENesin (MUCINEX) 1,200 mg tp12 ER tablet Take 1,200 mg by mouth two (2) times a day.     Provider, Historical  
 MULTIVITS-MINERALS/FA/LYCOPENE (MEN'S DAILY PO) Take 1 Tab by mouth daily. Provider, Historical  
aspirin 81 mg tablet Take 81 mg by mouth. TAKE AM OF SURGERY WITH SMALL SIP OF WATER    Provider, Historical  
 
No Known Allergies Review of Systems: 
Pertinent items are noted in the History of Present Illness. Lab Results Component Value Date/Time Hemoglobin A1c 6.2 (H) 08/22/2017 10:14 AM  
  
 
Immunization History Administered Date(s) Administered  Influenza High Dose Vaccine PF 09/14/2016  Influenza Vaccine 10/01/2014  Influenza Vaccine (Quad) Mdck Pf 09/21/2017  Influenza Vaccine PF 09/29/2015  Influenza Vaccine Whole 11/03/2011  Pneumococcal Conjugate (PCV-13) 08/22/2017  Pneumococcal Vaccine (Pcv) 10/01/2005, 10/20/2010  TB Skin Test (PPD) Intradermal 02/26/2015, 01/11/2019 Body mass index is 39.87 kg/m². Counseling regarding nutrition done: Yes Pateint counsled about risks of smoking and cessation Current medications: 
Current Outpatient Medications Medication Sig Dispense Refill  lisinopril (PRINIVIL, ZESTRIL) 20 mg tablet TAKE 1 TABLET BY MOUTH TWICE DAILY FOR HIGH BLOOD PRESSURE 180 Tab 3  warfarin (COUMADIN) 7.5 mg tablet Take 1 Tab by mouth daily. 30 Tab 0  
 furosemide (LASIX) 40 mg tablet Take 1 Tab by mouth two (2) times a day. 60 Tab 0  
 HYDROcodone-acetaminophen (NORCO)  mg tablet Take one tablet by mouth every 4 to 6 hours prn for pain. 135 Tab 0  
 amLODIPine (NORVASC) 10 mg tablet TAKE 1 TABLET BY MOUTH ONCE DAILY 90 Tab 3  pravastatin (PRAVACHOL) 40 mg tablet TAKE 1 TABLET BY MOUTH EVERY EVENING 90 Tab 3  carvedilol (COREG) 25 mg tablet TAKE 1 TABLET BY MOUTH TWICE DAILY WITH MEALS 180 Tab 3  
 guaiFENesin (MUCINEX) 1,200 mg tp12 ER tablet Take 1,200 mg by mouth two (2) times a day.  MULTIVITS-MINERALS/FA/LYCOPENE (MEN'S DAILY PO) Take 1 Tab by mouth daily.  aspirin 81 mg tablet Take 81 mg by mouth. TAKE AM OF SURGERY WITH SMALL SIP OF WATER Objective:  
 
Physical Exam:  
 
General: well developed, well nourished, pleasant , NAD. Hygiene good Psych: cooperative. Pleasant. No anxiety or depression. Normal mood and affect. Neuro: alert and oriented to person/place/situation. Otherwise nonfocal. 
Derm: Normal turgor for age, dry skin HEENT: Normocephalic, atraumatic Neck: Normal range of motion Chest: Good air entry bilaterally Cardio: Normal heart sounds Abdomen: Soft, nontender Lower extremities: color normal; temperature normal.  
Unstable gait. Data Review: No results found for this or any previous visit (from the past 24 hour(s)). Assessment:  
 
Good local wound care Edema management Nutrition optimization Good Diabetic control Plan:  
 
Granulation continues to improve with reduction in wound size. Continue wraps. And dressings.

## 2019-03-01 NOTE — PROGRESS NOTES
Wound Center Progress Note Patient: Melinda Donahue MRN: [id number]  SSN: [id number] YOB: 1940  Age: 77 y.o. Sex: male Subjective: Chief Complaint: 
Left knee wound History of Present Illness:    
Left leg contusion s/p fall Wound Caused By: Contusion, anticoagulation, lagos lesion Associated Signs and Symptoms: Initial drainage, pain Timing: constant wound since June 2 Quality: wound Severity: full thickness Modifying Factors: CHF, LE edema Current Wound Care: dressings and wound vac Wraps being tolerated well but fell down since last.  
 
12/14/2018 Continues to do well with dressings. Denies any changes, new issues. Tolerating well. No drainage. Past Medical History:  
Diagnosis Date  AICD (automatic cardioverter/defibrillator) present 10/27/2015  Aortic stenosis, mild to moderate 10/27/2015  Cardiomyopathy, ischemic 8/17/2012  Carotid occlusion, bilateral 8/20/2012  Chronic back pain 2/12/2015  Chronic rhinitis 10/27/2015  Chronic systolic congestive heart failure ([address] 10/27/2015 NYHA class 2  
 Coronary artery disease 8/17/2012  DVT, lower extremity, recurrent ([address] 1994 and 2003 - on chronic coumadin  Factor 5 Leiden mutation, heterozygous ([address] 8/17/2012  Heart attack ([address] \"I had a heart attack the day I was being discharged from my triple by-pass\"  History of alcoholism ([address] quit age 34  
 History of complete eye exam 10/2016  Hx of smoking quit after 19  Hyperlipidemia 8/17/2012  Hypertension 8/17/2012  NSVT (nonsustained ventricular tachycardia) (HCC)  Obesity 8/17/2012  Osteoarthritis 2/12/2015  Pacemaker  Paroxysmal atrial fibrillation ([address] 10/27/2015  Phlebitis and thrombophlebitis 1995, 2004  
 due to Factor 5 Leiden  S/P total knee arthroplasty 2/26/2015  Tobacco abuse 2/12/2015  Wears dentures Past Surgical History:  
Procedure Laterality Date [address]  HX CATARACT REMOVAL    
 iop  HX CORONARY ARTERY BYPASS GRAFT  x 3, 2011  
 in [place]  HX IMPLANTABLE CARDIOVERTER DEFIBRILLATOR  2011 +Defibrilator placement [facility]  HX LYMPH NODE DISSECTION Lymph node bx/removal from back  HX ORTHOPAEDIC Left Knee surgery Family History Problem Relation Age of Onset  Heart Disease Mother   
      CHF age 71  
 Other Father   
     blood clot  age 76  Cancer Sister 68 Pancreatic  Cancer Brother Skin  Heart Disease Brother Heart Valve Social History Tobacco Use  Smoking status: Former Smoker Packs/day: 1.00 Years: 20.00 Pack years: 20.00 Last attempt to quit: 1972 Years since quittin.1  Smokeless tobacco: Never Used Substance Use Topics  Alcohol use: No  
  Comment: quit at the age 34 Prior to Admission medications Medication Sig Start Date End Date Taking? Authorizing Provider  
lisinopril (PRINIVIL, ZESTRIL) 20 mg tablet TAKE 1 TABLET BY MOUTH TWICE DAILY FOR HIGH BLOOD PRESSURE 2/15/19   Galindo Lou MD  
warfarin (COUMADIN) 7.5 mg tablet Take 1 Tab by mouth daily. 19   Graham Bradford MD  
furosemide (LASIX) 40 mg tablet Take 1 Tab by mouth two (2) times a day. 19   Graham Bradford MD  
HYDROcodone-acetaminophen [facility])  mg tablet Take one tablet by mouth every 4 to 6 hours prn for pain. 19   Galindo Lou MD  
amLODIPine (NORVASC) 10 mg tablet TAKE 1 TABLET BY MOUTH ONCE DAILY 18   Galindo Lou MD  
pravastatin (PRAVACHOL) 40 mg tablet TAKE 1 TABLET BY MOUTH EVERY EVENING 18   Galindo Lou MD  
carvedilol (COREG) 25 mg tablet TAKE 1 TABLET BY MOUTH TWICE DAILY WITH MEALS 18   Galindo Lou MD  
guaiFENesin (MUCINEX) 1,200 mg tp12 ER tablet Take 1,200 mg by mouth two (2) times a day.     Provider, Historical  
 MULTIVITS-MINERALS/FA/LYCOPENE (MEN'S DAILY PO) Take 1 Tab by mouth daily. Provider, Historical  
aspirin 81 mg tablet Take 81 mg by mouth. TAKE AM OF SURGERY WITH SMALL SIP OF WATER    Provider, Historical  
 
No Known Allergies Review of Systems: 
Pertinent items are noted in the History of Present Illness. Lab Results Component Value Date/Time Hemoglobin A1c 6.2 (H) 08/22/2017 10:14 AM  
  
 
Immunization History Administered Date(s) Administered  Influenza High Dose Vaccine PF 09/14/2016  Influenza Vaccine 10/01/2014  Influenza Vaccine (Quad) Mdck Pf 09/21/2017  Influenza Vaccine PF 09/29/2015  Influenza Vaccine Whole 11/03/2011  Pneumococcal Conjugate (PCV-13) 08/22/2017  Pneumococcal Vaccine (Pcv) 10/01/2005, 10/20/2010  TB Skin Test (PPD) Intradermal 02/26/2015, 01/11/2019 Body mass index is 41.5 kg/m². Counseling regarding nutrition done: Yes Pateint counsled about risks of smoking and cessation Current medications: 
Current Outpatient Medications Medication Sig Dispense Refill  lisinopril (PRINIVIL, ZESTRIL) 20 mg tablet TAKE 1 TABLET BY MOUTH TWICE DAILY FOR HIGH BLOOD PRESSURE 180 Tab 3  warfarin (COUMADIN) 7.5 mg tablet Take 1 Tab by mouth daily. 30 Tab 0  
 furosemide (LASIX) 40 mg tablet Take 1 Tab by mouth two (2) times a day. 60 Tab 0  
 HYDROcodone-acetaminophen (NORCO)  mg tablet Take one tablet by mouth every 4 to 6 hours prn for pain. 135 Tab 0  
 amLODIPine (NORVASC) 10 mg tablet TAKE 1 TABLET BY MOUTH ONCE DAILY 90 Tab 3  pravastatin (PRAVACHOL) 40 mg tablet TAKE 1 TABLET BY MOUTH EVERY EVENING 90 Tab 3  carvedilol (COREG) 25 mg tablet TAKE 1 TABLET BY MOUTH TWICE DAILY WITH MEALS 180 Tab 3  
 guaiFENesin (MUCINEX) 1,200 mg tp12 ER tablet Take 1,200 mg by mouth two (2) times a day.  MULTIVITS-MINERALS/FA/LYCOPENE (MEN'S DAILY PO) Take 1 Tab by mouth daily.  aspirin 81 mg tablet Take 81 mg by mouth. TAKE AM OF SURGERY WITH SMALL SIP OF WATER Objective:  
 
Physical Exam:  
 
General: well developed, well nourished, pleasant , NAD. Hygiene good Psych: cooperative. Pleasant. No anxiety or depression. Normal mood and affect. Neuro: alert and oriented to person/place/situation. Otherwise nonfocal. 
Derm: Normal turgor for age, dry skin HEENT: Normocephalic, atraumatic Neck: Normal range of motion Chest: Good air entry bilaterally Cardio: Normal heart sounds Abdomen: Soft, nontender Lower extremities: color normal; temperature normal.  
Unstable gait. Data Review: No results found for this or any previous visit (from the past 24 hour(s)). Assessment:  
 
Good local wound care Edema management Nutrition optimization Good Diabetic control Plan:  
 
Almost completely healed. Continue dressings. Close to discharge.

## 2019-03-06 NOTE — PROGRESS NOTES
Hospitalist Progress Note Admit Date:  1/10/2019  4:02 PM  
Name:  Malachi Carreon Age:  77 y.o. 
:  1940 MRN:  [id number] PCP:  Kareem Cali MD 
Treatment Team: Attending Provider: Juanita Jasmine MD; Consulting Provider: Cee Pennington MD; Utilization Review: Justin Balderas RN; Hospitalist: Juanita Jasmine MD 
 
Subjective:  
Patient admitted 1/10 for acute hypoxic respiratory failure and CAP. 
 
: Wore bipap a few hours overnight, overall he doesn't like the mask. In good spirits, eager to get stronger. Ambulated quite a bit yesterday. Objective:  
 
Patient Vitals for the past 24 hrs: 
 Temp Pulse Resp BP SpO2  
19 0734 97.9 °F (36.6 °C) 74 28 136/64 93 % 19 0722     94 % 19 0704  75 (!) 33 129/62 93 % 19 0634  73 (!) 36 123/63 93 % 19 0604  82 (!) 59 143/71 92 % 19 0558  75 (!) 38 131/69 92 % 19 0504  76 29 135/61 90 % 19 0434 98.2 °F (36.8 °C) 75 25 123/64 93 % 19 0424     91 % 19 0404  72 27 138/70 94 % 19 0334  80 28 138/66 91 % 19 0304  73 25 128/60 93 % 19 0234  75 28 120/63 95 % 19 0204  72 28 129/59 98 % 19 0134  73 27 133/71 96 % 19 0104  67 23 125/78 99 % 19 0033  75 26 118/68 100 % 19 0004  76 (!) 37 122/63 92 % 19 0002     95 % 01/15/19 2346 97.7 °F (36.5 °C) 76 24 132/67 97 % 01/15/19 2231    115/57 95 % 01/15/19 2201  76 29 110/55 95 % 01/15/19 2131  68 30 114/54 94 % 01/15/19 2127     94 % 01/15/19 2101  73 28 112/51 92 % 01/15/19 2031  77 (!) 31 112/71 95 % 01/15/19 2000  80 (!) 33 96/59 93 % 01/15/19 193 97.8 °F (36.6 °C) 74 (!) 33 111/71 94 % 01/15/19 1900  79 (!) 36 106/55 92 % 01/15/19 1752  79  133/69   
01/15/19 1704     93 % 01/15/19 1530 97.8 °F (36.6 °C) 79 25 94/59 94 % 01/15/19 1500  82 29 126/65 93 % Patient taking differently   dosage corrected in pended order   please advise  LOV 01/22/2019   01/15/19 1430  79 21 123/54 93 % 01/15/19 1400  85 (!) 43 118/65 94 % 01/15/19 1330  82 (!) 47 104/63 94 % 01/15/19 1306     94 % 01/15/19 1300  77 29 (!) 87/52 94 % 01/15/19 1230  72 (!) 48 104/52 92 % 01/15/19 1200  73 27 97/63 94 % 01/15/19 1130 98 °F (36.7 °C) 69 30 103/49 92 % 01/15/19 1100  74 (!) 36 107/52 94 % 01/15/19 1043  64 29 106/51 92 % 01/15/19 1000  72 (!) 37 104/54 94 % 01/15/19 0930  67 28 102/55 92 % 01/15/19 0909  74 27 90/54 93 % 01/15/19 0900  65 28 108/52 93 % 01/15/19 0830  62 (!) 44 92/51 94 % Oxygen Therapy O2 Sat (%): 93 % (01/16/19 0734) Pulse via Oximetry: 75 beats per minute (01/16/19 0734) O2 Device: Nasal cannula (01/16/19 0734) O2 Flow Rate (L/min): 2 l/min (01/16/19 0734) O2 Temperature: (.) (01/15/19 2127) FIO2 (%): 28 % (01/16/19 0722) Intake/Output Summary (Last 24 hours) at 1/16/2019 0825 Last data filed at 1/16/2019 2652 Gross per 24 hour Intake 540 ml Output 3100 ml Net -2560 ml  
   
*Note that automatically entered I/Os may not be accurate; dependent on patient compliance with collection and accurate  by assistants. General:    Well nourished. Alert. Obese. CV:   RRR. No murmur, rub, or gallop. Lungs:   CTAB. No wheezing, rhonchi, or rales. Abdomen:   Soft, nontender, nondistended. Extremities: Warm and dry. 2+ b/l LE edema. Skin:     No rashes or jaundice. Neuro:  No gross focal deficits Data Review: 
I have reviewed all labs, meds, telemetry events, and studies from the last 24 hours: 
 
Recent Results (from the past 24 hour(s)) POC G3 Collection Time: 01/15/19 10:17 AM  
Result Value Ref Range Device: NASAL CANNULA pH (POC) 7.311 (L) 7.35 - 7.45    
 pCO2 (POC) 49.5 (H) 35 - 45 MMHG  
 pO2 (POC) 67 (L) 75 - 100 MMHG  
 HCO3 (POC) 25.0 22 - 26 MMOL/L  
 sO2 (POC) 91 (L) 95 - 98 % Base deficit (POC) 2 mmol/L Allens test (POC) YES  Site LEFT RADIAL    
 Patient temp. 98.6 Specimen type (POC) ARTERIAL Performed by Jaida   
 CO2, POC 26 MMOL/L Flow rate (POC) 2.000 L/min Critical value read back 00:01 COLLECT TIME 1,012 PROTHROMBIN TIME + INR Collection Time: 19  3:20 AM  
Result Value Ref Range Prothrombin time 18.1 (H) 11.7 - 14.5 sec INR 1.5 METABOLIC PANEL, BASIC Collection Time: 19  3:20 AM  
Result Value Ref Range Sodium 140 136 - 145 mmol/L Potassium 3.9 3.5 - 5.1 mmol/L Chloride 105 98 - 107 mmol/L  
 CO2 30 21 - 32 mmol/L Anion gap 5 mmol/L Glucose 104 (H) 65 - 100 mg/dL BUN 46 (H) 8 - 23 MG/DL Creatinine 1.25 0.8 - 1.5 MG/DL  
 GFR est AA >60 >60 ml/min/1.73m2 GFR est non-AA 59 ml/min/1.73m2 Calcium 7.9 (L) 8.3 - 10.4 MG/DL All Micro Results None Results for orders placed or performed during the hospital encounter of 01/10/19  
2D ECHO COMPLETE ADULT (TTE) W OR WO CONTR Narrative [facility] [address] [place], [address] [facility] 
[phone] Transthoracic Echocardiogram 
2D, M-mode, Doppler, and Color Doppler Patient: Nazanin Lopez 
MR #: [id number] : 1940 Age: 77 years Gender: Male Study date: 2019 Account #: [de-identified] Height: 72 in 
Weight: 278.5 lb 
BSA: 2.45 mï¾² Status:Routine Location: [facility] BP: 105/ 59 Allergies: NO KNOWN ALLERGENS Sonographer:  Estefania Navraro RDCS Group:  [address] Cardiology Referring Physician:  Martita [place] MD 
Reading Physician:  Gil Tracy MD [facility] INDICATIONS: CHF PROCEDURE: This was a routine study. A transthoracic echocardiogram was 
performed. The study included complete 2D imaging, M-mode, complete spectral 
Doppler, and color Doppler. Intravenous contrast (Definity, 3 ml) was 
administered. Echocardiographic views were limited by poor acoustic window availability and poor body habitus. This was a technically difficult study. LEFT VENTRICLE: Size was at the upper limits of normal. Systolic function was 
mildly to moderately reduced. Ejection fraction was estimated in the range of 
40 % to 45 %. There was moderate hypokinesis of the basal inferoseptal  
wall(s). There was akinesis of the basal-mid inferior wall(s). There was moderate 
concentric hypertrophy. Left ventricular diastolic dysfunction Grade 2. Average E/e' of 26.7. RIGHT VENTRICLE: The ventricle was dilated. Systolic function was low normal. 
 A 
pacing wire was present. LEFT ATRIUM: The atrium was moderately to markedly dilated. RIGHT ATRIUM: Not well visualized. SYSTEMIC VEINS: IVC: The inferior vena cava was dilated. The respirophasic 
change in diameter was less than 50%. AORTIC VALVE: The valve was trileaflet. DI: 0.43. The aortic valve area by  
the 
continuity equation was 1.46 cm2. The peak velocity was 3.24 m/s. The mean 
pressure gradient was 19 mmHg. The peak pressure gradient was 42 mmHg. There 
was no insufficiency. MITRAL VALVE: Valve structure was normal. There was no evidence for stenosis. There was trivial regurgitation. TRICUSPID VALVE: The valve structure was normal. There was no evidence for 
stenosis. There was trivial regurgitation. PULMONIC VALVE: Not well visualized. PERICARDIUM: There was no pericardial effusion. AORTA: The root exhibited normal size. SUMMARY: 
 
-  Left ventricle: Size was at the upper limits of normal. Systolic function 
was mildly to moderately reduced. Ejection fraction was estimated in the  
range 
of 40 % to 45 %. There was moderate hypokinesis of the basal inferoseptal 
wall(s). There was akinesis of the basal-mid inferior wall(s). There was 
moderate concentric hypertrophy. 
 
-  Right ventricle: The ventricle was dilated. -  Left atrium: The atrium was moderately to markedly dilated. -  Inferior vena cava, hepatic veins: The inferior vena cava was dilated. The 
respirophasic change in diameter was less than 50%. -  Aortic valve: The aortic valve area by the continuity equation was 1.46  
cm2. SYSTEM MEASUREMENT TABLES 
 
2D mode Left Atrium Systolic Volume Index; Method of Disks, Biplane; 2D mode;: 55.9 
ml/m2 IVS/LVPW (2D): 0.9 IVSd (2D): 1.9 cm LVIDd (2D): 5.5 cm LVIDs (2D): 5 cm 
LVOT Area (2D): 4.5 cm2 LVPWd (2D): 2 cm RVIDd (2D): 3.7 cm Unspecified Scan Mode Peak Grad; Mean; Antegrade Flow: 38 mm[Hg] Vmax; Antegrade Flow: 292 cm/s LVOT Diam: 2.4 cm Prepared and signed by 
 
Todd Adams. Rick Frank MD [facility] Signed 14-Jan-2019 16:10:12 Current Meds: 
Current Facility-Administered Medications Medication Dose Route Frequency  furosemide (LASIX) injection 40 mg  40 mg IntraVENous DAILY  albuterol-ipratropium (DUO-NEB) 2.5 MG-0.5 MG/3 ML  3 mL Nebulization QID RT  
 enoxaparin (LOVENOX) injection 40 mg  40 mg SubCUTAneous Q12H  warfarin (COUMADIN) tablet 7.5 mg  7.5 mg Oral QPM  
 pantoprazole (PROTONIX) tablet 40 mg  40 mg Oral ACB  calcium carbonate (TUMS) chewable tablet 200 mg [elemental]  200 mg Oral TID WITH MEALS  
 azithromycin (ZITHROMAX) 500 mg in 0.9% sodium chloride (MBP/ADV) 250 mL  500 mg IntraVENous Q24H  cefTRIAXone (ROCEPHIN) 1 g in 0.9% sodium chloride (MBP/ADV) 50 mL  1 g IntraVENous Q24H  nystatin (MYCOSTATIN) 100,000 unit/gram powder   Topical BID  influenza vaccine 2018-19 (6 mos+)(PF) (FLUARIX QUAD/FLULAVAL QUAD) injection 0.5 mL  0.5 mL IntraMUSCular PRIOR TO DISCHARGE  sodium chloride (NS) flush 10 mL  10 mL InterCATHeter Q8H  
 bisacodyl (DULCOLAX) tablet 10 mg  10 mg Oral QHS  docusate sodium (COLACE) capsule 400 mg  400 mg Oral QHS  amLODIPine (NORVASC) tablet 10 mg  10 mg Oral DAILY  aspirin delayed-release tablet 81 mg  81 mg Oral DAILY  carvedilol (COREG) tablet 25 mg  25 mg Oral BID WITH MEALS  
  guaiFENesin ER (MUCINEX) tablet 1,200 mg  1,200 mg Oral BID  
 HYDROcodone-acetaminophen (NORCO)  mg tablet 1 Tab  1 Tab Oral Q6H PRN  
 lisinopril (PRINIVIL, ZESTRIL) tablet 20 mg  20 mg Oral DAILY  pravastatin (PRAVACHOL) tablet 40 mg  40 mg Oral QHS  sodium chloride (NS) flush 5-40 mL  5-40 mL IntraVENous PRN  
 acetaminophen (TYLENOL) tablet 650 mg  650 mg Oral Q4H PRN  
 morphine injection 1 mg  1 mg IntraVENous Q4H PRN  
 naloxone (NARCAN) injection 0.4 mg  0.4 mg IntraVENous PRN  
 ondansetron (ZOFRAN) injection 4 mg  4 mg IntraVENous Q4H PRN  
 hydrALAZINE (APRESOLINE) 20 mg/mL injection 10 mg  10 mg IntraVENous Q6H PRN  
 loratadine (CLARITIN) tablet 10 mg  10 mg Oral DAILY Other Studies (last 24 hours): No results found. Assessment and Plan:  
 
Hospital Problems as of 1/16/2019 Date Reviewed: 10/2/2018 Codes Class Noted - Resolved POA Obesity hypoventilation syndrome (HCC) ICD-10-CM: T55.2 ICD-9-CM: 278.03  1/14/2019 - Present Unknown COPD exacerbation ([facility] ICD-10-CM: J44.1 ICD-9-CM: 491.21  1/11/2019 - Present Unknown Hyperkalemia ICD-10-CM: E87.5 ICD-9-CM: 276.7  1/10/2019 - Present Unknown Hypoxia ICD-10-CM: R09.02 
ICD-9-CM: 799.02  1/10/2019 - Present Unknown * (Principal) PNA (pneumonia) ICD-10-CM: J18.9 ICD-9-CM: 440  1/10/2019 - Present Unknown Respiratory failure with hypercapnia ([facility] ICD-10-CM: M56.37 
ICD-9-CM: 518.81  1/10/2019 - Present Unknown Essential hypertension ICD-10-CM: I10 
ICD-9-CM: 401.9  2/6/2017 - Present Yes Chronic systolic congestive heart failure (HCC) ICD-10-CM: I50.22 ICD-9-CM: 428.22, 428.0  10/27/2015 - Present Yes AICD (automatic cardioverter/defibrillator) present ICD-10-CM: Z95.810 ICD-9-CM: V45.02  10/27/2015 - Present Yes Paroxysmal atrial fibrillation (HCC) ICD-10-CM: I48.0 ICD-9-CM: 427.31  10/27/2015 - Present Yes Aortic stenosis, mild to moderate ICD-10-CM: I35.0 ICD-9-CM: 424.1  10/27/2015 - Present Yes Osteoarthritis (Chronic) ICD-10-CM: M19.90 ICD-9-CM: 715.90  2/12/2015 - Present Overview Signed 10/27/2015  9:58 AM by Jd Benigno With severe chronic low back pain. Chronic back pain (Chronic) ICD-10-CM: M54.9, G89.29 ICD-9-CM: 724.5, 338.29  2/12/2015 - Present Yes Obesity (Chronic) ICD-10-CM: L54.1 ICD-9-CM: 278.00  8/17/2012 - Present Yes Coronary artery disease (Chronic) ICD-10-CM: I25.10 ICD-9-CM: 414.00  8/17/2012 - Present Yes Overview Signed 8/17/2012  3:57 PM by Marla Melo Diagnosed 1999, CABG 12/2011 Cardiomyopathy, ischemic (Chronic) ICD-10-CM: I25.5 ICD-9-CM: 414.8  8/17/2012 - Present Yes Overview Signed 8/17/2012  3:58 PM by Marla Melo Pacemaker AICD 12/2011,  35% LUEF Factor 5 Leiden mutation, heterozygous ([facility] (Chronic) ICD-10-CM: T85.26 
ICD-9-CM: 289.81  8/17/2012 - Present Yes Overview Signed 8/17/2012  4:02 PM by Marla Melo Chronic anticoag., hx DVT Hyperlipidemia (Chronic) ICD-10-CM: W92.5 ICD-9-CM: 272.4  8/17/2012 - Present Yes Plan: # Acute hypoxic/hypercapnic respiratory failure - 2/2 CAP with superimposed, untreated JACOB/OHS 
            - Mildly tolerant of Bipap; APAP trial tonight.  
             - 2L NC during the day - Appreciate pulm. Transfer out of ICU 1/16. 
  
# CAP 
            - ceftriaxone/azithro 
  
# COPD 
            - steroids dc, nebs 
  
# Chronic dCHF 
            - euvolemic; home meds 
            - TTE unchanged from 2017 
  
# HTN 
            - home meds 
  
# H/o DVT/Factor V 
            - warfarin DC planning/Dispo:  Transfer to floor. APAP tonight. STR likely 1-2 days. Diet:  DIET CARDIAC 
DVT ppx: warfarin Signed: 
Gustavo Mohamud MD

## 2019-03-31 NOTE — PROGRESS NOTES
Wound Center Progress Note Patient: Dolores Mccoy MRN: [id number]  SSN: [id number] YOB: 1940  Age: [de-identified] y.o. Sex: male Subjective: Chief Complaint: 
Left knee wound History of Present Illness:    
Left leg contusion s/p fall Wound Caused By: Contusion, anticoagulation, lagos lesion Associated Signs and Symptoms: Initial drainage, pain Timing: constant wound since June 2 Quality: wound Severity: full thickness Modifying Factors: CHF, LE edema Current Wound Care: dressings and wound vac Wraps being tolerated well but fell down since last.  
 
12/14/2018 Continues to do well with dressings. Denies any changes, new issues. Tolerating well. No drainage. 12/28/2019 No further drainage. Feels may be healed. Past Medical History:  
Diagnosis Date  AICD (automatic cardioverter/defibrillator) present 10/27/2015  Aortic stenosis, mild to moderate 10/27/2015  Cardiomyopathy, ischemic 8/17/2012  Carotid occlusion, bilateral 8/20/2012  Chronic back pain 2/12/2015  Chronic rhinitis 10/27/2015  Chronic systolic congestive heart failure ([address] 10/27/2015 NYHA class 2  
 Coronary artery disease 8/17/2012  DVT, lower extremity, recurrent ([address] 1994 and 2003 - on chronic coumadin  Factor 5 Leiden mutation, heterozygous ([address] 8/17/2012  Heart attack ([address] \"I had a heart attack the day I was being discharged from my triple by-pass\"  History of alcoholism ([address] quit age 34  
 History of complete eye exam 10/2016  Hx of smoking quit after 19  Hyperlipidemia 8/17/2012  Hypertension 8/17/2012  NSVT (nonsustained ventricular tachycardia) (HCC)  Obesity 8/17/2012  Osteoarthritis 2/12/2015  Pacemaker  Paroxysmal atrial fibrillation ([address] 10/27/2015  Phlebitis and thrombophlebitis 1995, 2004  
 due to Factor 5 Leiden  S/P total knee arthroplasty 2/26/2015  Tobacco abuse 2/12/2015  Wears dentures Past Surgical History:  
Procedure Laterality Date [address]  HX CATARACT REMOVAL    
 iop  HX CORONARY ARTERY BYPASS GRAFT  x 3, 2011  
 in [place]  HX IMPLANTABLE CARDIOVERTER DEFIBRILLATOR  2011 +Defibrilator placement [facility]  
 back  HX LYMPH NODE DISSECTION Lymph node bx/removal from back  HX ORTHOPAEDIC Left Knee surgery Family History Problem Relation Age of Onset  Heart Disease Mother   
      CHF age 71  
 Other Father   
     blood clot  age 76  Cancer Sister 68 Pancreatic  Cancer Brother Skin  Heart Disease Brother Heart Valve Social History Tobacco Use  Smoking status: Former Smoker Packs/day: 1.00 Years: 20.00 Pack years: 20.00 Last attempt to quit: 1972 Years since quittin.3  Smokeless tobacco: Never Used Substance Use Topics  Alcohol use: No  
  Comment: quit at the age 34 Prior to Admission medications Medication Sig Start Date End Date Taking? Authorizing Provider  
lisinopril (PRINIVIL, ZESTRIL) 20 mg tablet TAKE 1 TABLET BY MOUTH TWICE DAILY FOR HIGH BLOOD PRESSURE 2/15/19   Sheeba Chou MD  
warfarin (COUMADIN) 7.5 mg tablet Take 1 Tab by mouth daily. 19   Ken Davis MD  
furosemide (LASIX) 40 mg tablet Take 1 Tab by mouth two (2) times a day. 19   Ken Davis MD  
HYDROcodone-acetaminophen [facility])  mg tablet Take one tablet by mouth every 4 to 6 hours prn for pain.  19   Sheeba Chou MD  
amLODIPine (NORVASC) 10 mg tablet TAKE 1 TABLET BY MOUTH ONCE DAILY 18   Sheeba Chou MD  
pravastatin (PRAVACHOL) 40 mg tablet TAKE 1 TABLET BY MOUTH EVERY EVENING 18   Sheeba Chou MD  
carvedilol (COREG) 25 mg tablet TAKE 1 TABLET BY MOUTH TWICE DAILY WITH MEALS 18   Sheeba Chou MD  
 guaiFENesin (MUCINEX) 1,200 mg tp12 ER tablet Take 1,200 mg by mouth two (2) times a day. Provider, Historical  
MULTIVITS-MINERALS/FA/LYCOPENE (MEN'S DAILY PO) Take 1 Tab by mouth daily. Provider, Historical  
aspirin 81 mg tablet Take 81 mg by mouth. TAKE AM OF SURGERY WITH SMALL SIP OF WATER    Provider, Historical  
 
No Known Allergies Review of Systems: 
Pertinent items are noted in the History of Present Illness. Lab Results Component Value Date/Time Hemoglobin A1c 6.2 (H) 08/22/2017 10:14 AM  
  
 
Immunization History Administered Date(s) Administered  Influenza High Dose Vaccine PF 09/14/2016  Influenza Vaccine 10/01/2014  Influenza Vaccine (Quad) Mdck Pf 09/21/2017  Influenza Vaccine PF 09/29/2015  Influenza Vaccine Whole 11/03/2011  Pneumococcal Conjugate (PCV-13) 08/22/2017  Pneumococcal Vaccine (Pcv) 10/01/2005, 10/20/2010  TB Skin Test (PPD) Intradermal 02/26/2015, 01/11/2019 There is no height or weight on file to calculate BMI. Counseling regarding nutrition done: Yes Pateint counsled about risks of smoking and cessation Current medications: 
Current Outpatient Medications Medication Sig Dispense Refill  lisinopril (PRINIVIL, ZESTRIL) 20 mg tablet TAKE 1 TABLET BY MOUTH TWICE DAILY FOR HIGH BLOOD PRESSURE 180 Tab 3  warfarin (COUMADIN) 7.5 mg tablet Take 1 Tab by mouth daily. 30 Tab 0  
 furosemide (LASIX) 40 mg tablet Take 1 Tab by mouth two (2) times a day. 60 Tab 0  
 HYDROcodone-acetaminophen (NORCO)  mg tablet Take one tablet by mouth every 4 to 6 hours prn for pain. 135 Tab 0  
 amLODIPine (NORVASC) 10 mg tablet TAKE 1 TABLET BY MOUTH ONCE DAILY 90 Tab 3  pravastatin (PRAVACHOL) 40 mg tablet TAKE 1 TABLET BY MOUTH EVERY EVENING 90 Tab 3  carvedilol (COREG) 25 mg tablet TAKE 1 TABLET BY MOUTH TWICE DAILY WITH MEALS 180 Tab 3  
 guaiFENesin (MUCINEX) 1,200 mg tp12 ER tablet Take 1,200 mg by mouth two (2) times a day.  MULTIVITS-MINERALS/FA/LYCOPENE (MEN'S DAILY PO) Take 1 Tab by mouth daily.  aspirin 81 mg tablet Take 81 mg by mouth. TAKE AM OF SURGERY WITH SMALL SIP OF WATER Objective:  
 
Physical Exam:  
 
General: well developed, well nourished, pleasant , NAD. Hygiene good Psych: cooperative. Pleasant. No anxiety or depression. Normal mood and affect. Neuro: alert and oriented to person/place/situation. Otherwise nonfocal. 
Derm: Normal turgor for age, dry skin HEENT: Normocephalic, atraumatic Neck: Normal range of motion Chest: Good air entry bilaterally Cardio: Normal heart sounds Abdomen: Soft, nontender Lower extremities: color normal; temperature normal.  
Unstable gait. Data Review: No results found for this or any previous visit (from the past 24 hour(s)). Assessment:  
 
Good local wound care Edema management Nutrition optimization Good Diabetic control Plan:  
 
Healed, d/c Discussed criteria for return to office.

## 2019-08-09 NOTE — PROGRESS NOTES
Wound Center Progress Note    Patient: Vero Alaniz MRN: [id number]  SSN: [id number]    YOB: 1940  Age: [de-identified] y.o. Sex: male      Subjective:     Chief Complaint:  Left knee wound    History of Present Illness:     Left leg contusion s/p fall    Wound Caused By: Contusion, anticoagulation, lagos lesion  Associated Signs and Symptoms: Initial drainage, pain  Timing: constant wound since June 2  Quality: wound  Severity: full thickness  Modifying Factors: CHF, LE edema  Current Wound Care: dressings and wound vac    Wraps being tolerated well but fell down since last.     11/30/2018  Less drainage. Continues to tolerate wraps.  Happy with HH and overall progress    Past Medical History:   Diagnosis Date    AICD (automatic cardioverter/defibrillator) present 10/27/2015    Aortic stenosis, mild to moderate 10/27/2015    Cardiomyopathy, ischemic 8/17/2012    Carotid occlusion, bilateral 8/20/2012    Chronic back pain 2/12/2015    Chronic rhinitis 10/27/2015    Chronic systolic congestive heart failure ([address] 10/27/2015    NYHA class 2    Coronary artery disease 8/17/2012    DVT, lower extremity, recurrent ([address]     1994 and 2003 - on chronic coumadin    Factor 5 Leiden mutation, heterozygous ([address] 8/17/2012    Heart attack ([address]     \"I had a heart attack the day I was being discharged from my triple by-pass\"    History of alcoholism ([address] quit age 34    History of complete eye exam 10/2016    Hx of smoking quit after 20    Hyperlipidemia 8/17/2012    Hypertension 8/17/2012    NSVT (nonsustained ventricular tachycardia) ([address]     Obesity 8/17/2012    Osteoarthritis 2/12/2015    Pacemaker     Paroxysmal atrial fibrillation ([address] 10/27/2015    Phlebitis and thrombophlebitis 1995, 2004    due to Factor 5 Leiden    S/P total knee arthroplasty 2/26/2015    Tobacco abuse 2/12/2015    Wears dentures       Past Surgical History:   Procedure Laterality Date    HX ANGIOPLASTY     rotoblator    HX CATARACT REMOVAL      iop    HX CORONARY ARTERY BYPASS GRAFT  x 3, 2011    in [address]  2011    +Defibrilator placement    HX LIPOMA RESECTION  1998    back    HX LYMPH NODE DISSECTION      Lymph node bx/removal from back    HX ORTHOPAEDIC Left     Knee surgery     Family History   Problem Relation Age of Onset    Heart Disease Mother          CHF age 71    Other Father         blood clot  age 76    Cancer Sister 68        Pancreatic    Cancer Brother         Skin    Heart Disease Brother         Heart Valve      Social History     Tobacco Use    Smoking status: Former Smoker     Packs/day: 1.00     Years: 20.00     Pack years: 20.00     Last attempt to quit: 1972     Years since quittin.6    Smokeless tobacco: Never Used   Substance Use Topics    Alcohol use: No     Comment: quit at the age 34       Prior to Admission medications    Medication Sig Start Date End Date Taking? Authorizing Provider   hydrocortisone (HYTONE) 2.5 % topical cream Apply  to affected area two (2) times a day. use thin layer 19   Haris Notice, MD   traMADol Lula Thakkar) 50 mg tablet Take 50 mg by mouth every six (6) hours as needed for Pain.     Provider, Historical   furosemide (LASIX) 40 mg tablet TAKE 1 TABLET BY MOUTH TWICE DAILY 19   Haris Notice, MD   pravastatin (PRAVACHOL) 40 mg tablet TAKE 1 TABLET BY MOUTH EVERY EVENING 19   Haris Notice, MD   warfarin (COUMADIN) 5 mg tablet TAKE 2 TABLET BY MOUTH FOR 6 DAYS A WEEK AND TAKE 1 TABLET BY MOUTH 1 DAY OF THE WEEK 19   Haris Notice, MD   lisinopril (PRINIVIL, ZESTRIL) 20 mg tablet TAKE 1 TABLET BY MOUTH TWICE DAILY FOR HIGH BLOOD PRESSURE 19   Haris Notice, MD   amLODIPine (NORVASC) 10 mg tablet TAKE 1 TABLET BY MOUTH ONCE DAILY 5/15/19   Haris Notice, MD   carvedilol (COREG) 25 mg tablet TAKE 1 TABLET BY MOUTH TWICE DAILY WITH MEALS 19 Eb Solorio MD   MULTIVITS-MINERALS/FA/LYCOPENE (MEN'S DAILY PO) Take 1 Tab by mouth daily. Provider, Historical   aspirin 81 mg tablet Take 81 mg by mouth. TAKE AM OF SURGERY WITH SMALL SIP OF WATER    Provider, Historical     No Known Allergies     Review of Systems:  Pertinent items are noted in the History of Present Illness. Lab Results   Component Value Date/Time    Hemoglobin A1c 6.2 (H) 08/22/2017 10:14 AM        Immunization History   Administered Date(s) Administered    Influenza High Dose Vaccine PF 09/14/2016    Influenza Vaccine 10/01/2014    Influenza Vaccine (Quad) 03/03/2019    Influenza Vaccine (Quad) Mdck Pf 09/21/2017    Influenza Vaccine PF 09/29/2015    Influenza Vaccine Whole 11/03/2011    Pneumococcal Conjugate (PCV-13) 08/22/2017    Pneumococcal Polysaccharide (PPSV-23) 03/22/2004    Pneumococcal Vaccine (Pcv) 10/01/2005, 10/20/2010    TB Skin Test (PPD) Intradermal 02/26/2015, 01/11/2019    Tdap 07/12/2019       Body mass index is 41.58 kg/m². Counseling regarding nutrition done: Yes Pateint counsled about risks of smoking and cessation      Current medications:  Current Outpatient Medications   Medication Sig Dispense Refill    hydrocortisone (HYTONE) 2.5 % topical cream Apply  to affected area two (2) times a day. use thin layer 28 g 11    traMADol (ULTRAM) 50 mg tablet Take 50 mg by mouth every six (6) hours as needed for Pain.       furosemide (LASIX) 40 mg tablet TAKE 1 TABLET BY MOUTH TWICE DAILY 180 Tab 3    pravastatin (PRAVACHOL) 40 mg tablet TAKE 1 TABLET BY MOUTH EVERY EVENING 90 Tab 3    warfarin (COUMADIN) 5 mg tablet TAKE 2 TABLET BY MOUTH FOR 6 DAYS A WEEK AND TAKE 1 TABLET BY MOUTH 1 DAY OF THE WEEK 60 Tab 11    lisinopril (PRINIVIL, ZESTRIL) 20 mg tablet TAKE 1 TABLET BY MOUTH TWICE DAILY FOR HIGH BLOOD PRESSURE 180 Tab 3    amLODIPine (NORVASC) 10 mg tablet TAKE 1 TABLET BY MOUTH ONCE DAILY 90 Tab 3    carvedilol (COREG) 25 mg tablet TAKE 1 TABLET BY MOUTH TWICE DAILY WITH MEALS 180 Tab 3    MULTIVITS-MINERALS/FA/LYCOPENE (MEN'S DAILY PO) Take 1 Tab by mouth daily.  aspirin 81 mg tablet Take 81 mg by mouth. TAKE AM OF SURGERY WITH SMALL SIP OF WATER           Objective:     Physical Exam:     General: well developed, well nourished, pleasant , NAD. Hygiene good  Psych: cooperative. Pleasant. No anxiety or depression. Normal mood and affect. Neuro: alert and oriented to person/place/situation. Otherwise nonfocal.  Derm: Normal turgor for age, dry skin  HEENT: Normocephalic, atraumatic  Neck: Normal range of motion  Chest: Good air entry bilaterally  Cardio: Normal heart sounds  Abdomen: Soft, nontender  Lower extremities: color normal; temperature normal.   Unstable gait. No knee instability. Data Review:   No results found for this or any previous visit (from the past 24 hour(s)). Assessment:     Good local wound care  Edema management  Nutrition optimization  Good Diabetic control    Plan:     Continue endoform and hydrafera with compression. Wound healing accelerating. HH to perform and recheck every 2 weeks.

## 2019-08-09 NOTE — PROGRESS NOTES
Wound Center Progress Note    Patient: Casie Summers MRN: [id number]  SSN: [id number]    YOB: 1940  Age: [de-identified] y.o. Sex: male      Subjective:     Chief Complaint:  Left knee wound    History of Present Illness:     Left leg contusion s/p fall    Wound Caused By: Contusion, anticoagulation, lagos lesion  Associated Signs and Symptoms: Initial drainage, pain  Timing: constant wound since June 2  Quality: wound  Severity: full thickness  Modifying Factors: CHF, LE edema  Current Wound Care: dressings and wound vac    Wraps being tolerated well but fell down since last.     10/26/2018  Less drainage. Continues to tolerate wraps.  Happy with HH and overall progress    Past Medical History:   Diagnosis Date    AICD (automatic cardioverter/defibrillator) present 10/27/2015    Aortic stenosis, mild to moderate 10/27/2015    Cardiomyopathy, ischemic 8/17/2012    Carotid occlusion, bilateral 8/20/2012    Chronic back pain 2/12/2015    Chronic rhinitis 10/27/2015    Chronic systolic congestive heart failure ([address] 10/27/2015    NYHA class 2    Coronary artery disease 8/17/2012    DVT, lower extremity, recurrent ([address]     1994 and 2003 - on chronic coumadin    Factor 5 Leiden mutation, heterozygous ([address] 8/17/2012    Heart attack ([address]     \"I had a heart attack the day I was being discharged from my triple by-pass\"    History of alcoholism ([address] quit age 34    History of complete eye exam 10/2016    Hx of smoking quit after 20    Hyperlipidemia 8/17/2012    Hypertension 8/17/2012    NSVT (nonsustained ventricular tachycardia) ([address]     Obesity 8/17/2012    Osteoarthritis 2/12/2015    Pacemaker     Paroxysmal atrial fibrillation ([address] 10/27/2015    Phlebitis and thrombophlebitis 1995, 2004    due to Factor 5 Leiden    S/P total knee arthroplasty 2/26/2015    Tobacco abuse 2/12/2015    Wears dentures       Past Surgical History:   Procedure Laterality Date    HX ANGIOPLASTY     rotoblator    HX CATARACT REMOVAL      iop    HX CORONARY ARTERY BYPASS GRAFT  x 3, 2011    in [address]  2011    +Defibrilator placement    HX LIPOMA RESECTION  1998    back    HX LYMPH NODE DISSECTION      Lymph node bx/removal from back    HX ORTHOPAEDIC Left     Knee surgery     Family History   Problem Relation Age of Onset    Heart Disease Mother          CHF age 71    Other Father         blood clot  age 76    Cancer Sister 68        Pancreatic    Cancer Brother         Skin    Heart Disease Brother         Heart Valve      Social History     Tobacco Use    Smoking status: Former Smoker     Packs/day: 1.00     Years: 20.00     Pack years: 20.00     Last attempt to quit: 1972     Years since quittin.6    Smokeless tobacco: Never Used   Substance Use Topics    Alcohol use: No     Comment: quit at the age 34       Prior to Admission medications    Medication Sig Start Date End Date Taking? Authorizing Provider   hydrocortisone (HYTONE) 2.5 % topical cream Apply  to affected area two (2) times a day. use thin layer 19   Evangelina Strickland MD   traMADol Tempie Cramp) 50 mg tablet Take 50 mg by mouth every six (6) hours as needed for Pain.     Provider, Historical   furosemide (LASIX) 40 mg tablet TAKE 1 TABLET BY MOUTH TWICE DAILY 19   Evangelina Strickland MD   pravastatin (PRAVACHOL) 40 mg tablet TAKE 1 TABLET BY MOUTH EVERY EVENING 19   Evangelina Strickland MD   warfarin (COUMADIN) 5 mg tablet TAKE 2 TABLET BY MOUTH FOR 6 DAYS A WEEK AND TAKE 1 TABLET BY MOUTH 1 DAY OF THE WEEK 19   Evangelina Strickland MD   lisinopril (PRINIVIL, ZESTRIL) 20 mg tablet TAKE 1 TABLET BY MOUTH TWICE DAILY FOR HIGH BLOOD PRESSURE 19   Evangelina Strickland MD   amLODIPine (NORVASC) 10 mg tablet TAKE 1 TABLET BY MOUTH ONCE DAILY 5/15/19   Evangelina Strickland MD   carvedilol (COREG) 25 mg tablet TAKE 1 TABLET BY MOUTH TWICE DAILY WITH MEALS 19 Sue Thompson MD   MULTIVITS-MINERALS/FA/LYCOPENE (MEN'S DAILY PO) Take 1 Tab by mouth daily. Provider, Historical   aspirin 81 mg tablet Take 81 mg by mouth. TAKE AM OF SURGERY WITH SMALL SIP OF WATER    Provider, Historical     No Known Allergies     Review of Systems:  Pertinent items are noted in the History of Present Illness. Lab Results   Component Value Date/Time    Hemoglobin A1c 6.2 (H) 08/22/2017 10:14 AM        Immunization History   Administered Date(s) Administered    Influenza High Dose Vaccine PF 09/14/2016    Influenza Vaccine 10/01/2014    Influenza Vaccine (Quad) 03/03/2019    Influenza Vaccine (Quad) Mdck Pf 09/21/2017    Influenza Vaccine PF 09/29/2015    Influenza Vaccine Whole 11/03/2011    Pneumococcal Conjugate (PCV-13) 08/22/2017    Pneumococcal Polysaccharide (PPSV-23) 03/22/2004    Pneumococcal Vaccine (Pcv) 10/01/2005, 10/20/2010    TB Skin Test (PPD) Intradermal 02/26/2015, 01/11/2019    Tdap 07/12/2019       Body mass index is 41.15 kg/m². Counseling regarding nutrition done: Yes Pateint counsled about risks of smoking and cessation      Current medications:  Current Outpatient Medications   Medication Sig Dispense Refill    hydrocortisone (HYTONE) 2.5 % topical cream Apply  to affected area two (2) times a day. use thin layer 28 g 11    traMADol (ULTRAM) 50 mg tablet Take 50 mg by mouth every six (6) hours as needed for Pain.       furosemide (LASIX) 40 mg tablet TAKE 1 TABLET BY MOUTH TWICE DAILY 180 Tab 3    pravastatin (PRAVACHOL) 40 mg tablet TAKE 1 TABLET BY MOUTH EVERY EVENING 90 Tab 3    warfarin (COUMADIN) 5 mg tablet TAKE 2 TABLET BY MOUTH FOR 6 DAYS A WEEK AND TAKE 1 TABLET BY MOUTH 1 DAY OF THE WEEK 60 Tab 11    lisinopril (PRINIVIL, ZESTRIL) 20 mg tablet TAKE 1 TABLET BY MOUTH TWICE DAILY FOR HIGH BLOOD PRESSURE 180 Tab 3    amLODIPine (NORVASC) 10 mg tablet TAKE 1 TABLET BY MOUTH ONCE DAILY 90 Tab 3    carvedilol (COREG) 25 mg tablet TAKE 1 TABLET BY MOUTH TWICE DAILY WITH MEALS 180 Tab 3    MULTIVITS-MINERALS/FA/LYCOPENE (MEN'S DAILY PO) Take 1 Tab by mouth daily.  aspirin 81 mg tablet Take 81 mg by mouth. TAKE AM OF SURGERY WITH SMALL SIP OF WATER           Objective:     Physical Exam:     General: well developed, well nourished, pleasant , NAD. Hygiene good  Psych: cooperative. Pleasant. No anxiety or depression. Normal mood and affect. Neuro: alert and oriented to person/place/situation. Otherwise nonfocal.  Derm: Normal turgor for age, dry skin  HEENT: Normocephalic, atraumatic  Neck: Normal range of motion  Chest: Good air entry bilaterally  Cardio: Normal heart sounds  Abdomen: Soft, nontender  Lower extremities: color normal; temperature normal.   Unstable gait. No knee instability. Data Review:   No results found for this or any previous visit (from the past 24 hour(s)). Assessment:     Good local wound care  Edema management  Nutrition optimization  Good Diabetic control    Plan:     Continue endoform and hydrafera with compression. Wound healing accelerating.

## 2019-08-09 NOTE — PROGRESS NOTES
Wound Center Progress Note    Patient: Randy Jon MRN: [id number]  SSN: [id number]    YOB: 1940  Age: [de-identified] y.o. Sex: male      Subjective:     Chief Complaint:  Left knee wound    History of Present Illness:     Left leg contusion s/p fall    Wound Caused By: Contusion, anticoagulation, lagos lesion  Associated Signs and Symptoms: Initial drainage, pain  Timing: constant wound since June 2  Quality: wound  Severity: full thickness  Modifying Factors: CHF, LE edema  Current Wound Care: dressings and wound vac    Wraps being tolerated well but fell down since last.     11/16/2018  Less drainage. Continues to tolerate wraps. Most recent wraps applied by [facility] slipped .     Past Medical History:   Diagnosis Date    AICD (automatic cardioverter/defibrillator) present 10/27/2015    Aortic stenosis, mild to moderate 10/27/2015    Cardiomyopathy, ischemic 8/17/2012    Carotid occlusion, bilateral 8/20/2012    Chronic back pain 2/12/2015    Chronic rhinitis 10/27/2015    Chronic systolic congestive heart failure ([address] 10/27/2015    NYHA class 2    Coronary artery disease 8/17/2012    DVT, lower extremity, recurrent ([address]     1994 and 2003 - on chronic coumadin    Factor 5 Leiden mutation, heterozygous ([address] 8/17/2012    Heart attack ([address]     \"I had a heart attack the day I was being discharged from my triple by-pass\"    History of alcoholism ([address] quit age 34    History of complete eye exam 10/2016    Hx of smoking quit after 20    Hyperlipidemia 8/17/2012    Hypertension 8/17/2012    NSVT (nonsustained ventricular tachycardia) ([address]     Obesity 8/17/2012    Osteoarthritis 2/12/2015    Pacemaker     Paroxysmal atrial fibrillation ([address] 10/27/2015    Phlebitis and thrombophlebitis 1995, 2004    due to Factor 5 Leiden    S/P total knee arthroplasty 2/26/2015    Tobacco abuse 2/12/2015    Wears dentures       Past Surgical History:   Procedure Laterality Date    HX ANGIOPLASTY      rotoblator    HX CATARACT REMOVAL      iop    HX CORONARY ARTERY BYPASS GRAFT  x 3, 2011    in [address]  2011    +Defibrilator placement    HX LIPOMA RESECTION  1998    back    HX LYMPH NODE DISSECTION      Lymph node bx/removal from back    HX ORTHOPAEDIC Left     Knee surgery     Family History   Problem Relation Age of Onset    Heart Disease Mother          CHF age 71    Other Father         blood clot  age 76    Cancer Sister 68        Pancreatic    Cancer Brother         Skin    Heart Disease Brother         Heart Valve      Social History     Tobacco Use    Smoking status: Former Smoker     Packs/day: 1.00     Years: 20.00     Pack years: 20.00     Last attempt to quit: 1972     Years since quittin.6    Smokeless tobacco: Never Used   Substance Use Topics    Alcohol use: No     Comment: quit at the age 34       Prior to Admission medications    Medication Sig Start Date End Date Taking? Authorizing Provider   hydrocortisone (HYTONE) 2.5 % topical cream Apply  to affected area two (2) times a day. use thin layer 19   Helen Cali MD   traMADol Marin Rai) 50 mg tablet Take 50 mg by mouth every six (6) hours as needed for Pain.     Provider, Historical   furosemide (LASIX) 40 mg tablet TAKE 1 TABLET BY MOUTH TWICE DAILY 19   Helen Cali MD   pravastatin (PRAVACHOL) 40 mg tablet TAKE 1 TABLET BY MOUTH EVERY EVENING 19   Helen Cali MD   warfarin (COUMADIN) 5 mg tablet TAKE 2 TABLET BY MOUTH FOR 6 DAYS A WEEK AND TAKE 1 TABLET BY MOUTH 1 DAY OF THE WEEK 19   Helen Cali MD   lisinopril (PRINIVIL, ZESTRIL) 20 mg tablet TAKE 1 TABLET BY MOUTH TWICE DAILY FOR HIGH BLOOD PRESSURE 19   Helen Cali MD   amLODIPine (NORVASC) 10 mg tablet TAKE 1 TABLET BY MOUTH ONCE DAILY 5/15/19   Helen Cali MD   carvedilol (COREG) 25 mg tablet TAKE 1 TABLET BY MOUTH TWICE DAILY WITH MEALS 4/30/19   Haris Fregoso MD   MULTIVITS-MINERALS/FA/LYCOPENE (MEN'S DAILY PO) Take 1 Tab by mouth daily. Provider, Historical   aspirin 81 mg tablet Take 81 mg by mouth. TAKE AM OF SURGERY WITH SMALL SIP OF WATER    Provider, Historical     No Known Allergies     Review of Systems:  Pertinent items are noted in the History of Present Illness. Lab Results   Component Value Date/Time    Hemoglobin A1c 6.2 (H) 08/22/2017 10:14 AM        Immunization History   Administered Date(s) Administered    Influenza High Dose Vaccine PF 09/14/2016    Influenza Vaccine 10/01/2014    Influenza Vaccine (Quad) 03/03/2019    Influenza Vaccine (Quad) Mdck Pf 09/21/2017    Influenza Vaccine PF 09/29/2015    Influenza Vaccine Whole 11/03/2011    Pneumococcal Conjugate (PCV-13) 08/22/2017    Pneumococcal Polysaccharide (PPSV-23) 03/22/2004    Pneumococcal Vaccine (Pcv) 10/01/2005, 10/20/2010    TB Skin Test (PPD) Intradermal 02/26/2015, 01/11/2019    Tdap 07/12/2019       Body mass index is 41.09 kg/m². Counseling regarding nutrition done: Yes Pateint counsled about risks of smoking and cessation      Current medications:  Current Outpatient Medications   Medication Sig Dispense Refill    hydrocortisone (HYTONE) 2.5 % topical cream Apply  to affected area two (2) times a day. use thin layer 28 g 11    traMADol (ULTRAM) 50 mg tablet Take 50 mg by mouth every six (6) hours as needed for Pain.       furosemide (LASIX) 40 mg tablet TAKE 1 TABLET BY MOUTH TWICE DAILY 180 Tab 3    pravastatin (PRAVACHOL) 40 mg tablet TAKE 1 TABLET BY MOUTH EVERY EVENING 90 Tab 3    warfarin (COUMADIN) 5 mg tablet TAKE 2 TABLET BY MOUTH FOR 6 DAYS A WEEK AND TAKE 1 TABLET BY MOUTH 1 DAY OF THE WEEK 60 Tab 11    lisinopril (PRINIVIL, ZESTRIL) 20 mg tablet TAKE 1 TABLET BY MOUTH TWICE DAILY FOR HIGH BLOOD PRESSURE 180 Tab 3    amLODIPine (NORVASC) 10 mg tablet TAKE 1 TABLET BY MOUTH ONCE DAILY 90 Tab 3    carvedilol (COREG) 25 mg tablet TAKE 1 TABLET BY MOUTH TWICE DAILY WITH MEALS 180 Tab 3    MULTIVITS-MINERALS/FA/LYCOPENE (MEN'S DAILY PO) Take 1 Tab by mouth daily.  aspirin 81 mg tablet Take 81 mg by mouth. TAKE AM OF SURGERY WITH SMALL SIP OF WATER           Objective:     Physical Exam:     General: well developed, well nourished, pleasant , NAD. Hygiene good  Psych: cooperative. Pleasant. No anxiety or depression. Normal mood and affect. Neuro: alert and oriented to person/place/situation. Otherwise nonfocal.  Derm: Normal turgor for age, dry skin  HEENT: Normocephalic, atraumatic  Neck: Normal range of motion  Chest: Good air entry bilaterally  Cardio: Normal heart sounds  Abdomen: Soft, nontender  Lower extremities: color normal; temperature normal.   Unstable gait. No knee instability. Data Review:   No results found for this or any previous visit (from the past 24 hour(s)). Assessment:     Good local wound care  Edema management  Nutrition optimization  Good Diabetic control    Plan:     Continue endoform and hydrafera with compression. Patient to contact us if wraps slip. Wound healing accelerating.

## 2019-08-09 NOTE — PROGRESS NOTES
Wound Center Progress Note    Patient: Kip Roland MRN: [id number]  SSN: [id number]    YOB: 1940  Age: [de-identified] y.o. Sex: male      Subjective:     Chief Complaint:  Left knee wound    History of Present Illness:     Left leg contusion s/p fall    Wound Caused By: Contusion, anticoagulation, lagos lesion  Associated Signs and Symptoms: Initial drainage, pain  Timing: constant wound since June 2  Quality: wound  Severity: full thickness  Modifying Factors: CHF, LE edema  Current Wound Care: dressings and wound vac    Wraps being tolerated well but fell down since last.     10/12/2018    Less drainage. Continues to tolerate wraps.     Past Medical History:   Diagnosis Date    AICD (automatic cardioverter/defibrillator) present 10/27/2015    Aortic stenosis, mild to moderate 10/27/2015    Cardiomyopathy, ischemic 8/17/2012    Carotid occlusion, bilateral 8/20/2012    Chronic back pain 2/12/2015    Chronic rhinitis 10/27/2015    Chronic systolic congestive heart failure ([address] 10/27/2015    NYHA class 2    Coronary artery disease 8/17/2012    DVT, lower extremity, recurrent ([address]     1994 and 2003 - on chronic coumadin    Factor 5 Leiden mutation, heterozygous ([address] 8/17/2012    Heart attack ([address]     \"I had a heart attack the day I was being discharged from my triple by-pass\"    History of alcoholism ([address] quit age 34    History of complete eye exam 10/2016    Hx of smoking quit after 19    Hyperlipidemia 8/17/2012    Hypertension 8/17/2012    NSVT (nonsustained ventricular tachycardia) ([address]     Obesity 8/17/2012    Osteoarthritis 2/12/2015    Pacemaker     Paroxysmal atrial fibrillation ([address] 10/27/2015    Phlebitis and thrombophlebitis 1995, 2004    due to Factor 5 Leiden    S/P total knee arthroplasty 2/26/2015    Tobacco abuse 2/12/2015    Wears dentures       Past Surgical History:   Procedure Laterality Date    HX ANGIOPLASTY  1999    rotoblator    HX CATARACT REMOVAL      iop    HX CORONARY ARTERY BYPASS GRAFT  x 3, 2011    in [facility] [address]  2011    +Defibrilator placement    HX LIPOMA RESECTION  1998    back    HX LYMPH NODE DISSECTION      Lymph node bx/removal from back    HX ORTHOPAEDIC Left     Knee surgery     Family History   Problem Relation Age of Onset    Heart Disease Mother          CHF age 71    Other Father         blood clot  age 76    Cancer Sister 68        Pancreatic    Cancer Brother         Skin    Heart Disease Brother         Heart Valve      Social History     Tobacco Use    Smoking status: Former Smoker     Packs/day: 1.00     Years: 20.00     Pack years: 20.00     Last attempt to quit: 1972     Years since quittin.6    Smokeless tobacco: Never Used   Substance Use Topics    Alcohol use: No     Comment: quit at the age 34       Prior to Admission medications    Medication Sig Start Date End Date Taking? Authorizing Provider   hydrocortisone (HYTONE) 2.5 % topical cream Apply  to affected area two (2) times a day. use thin layer 19   Marci Zacarias MD   traMADol Rudene Holly) 50 mg tablet Take 50 mg by mouth every six (6) hours as needed for Pain.     Provider, Historical   furosemide (LASIX) 40 mg tablet TAKE 1 TABLET BY MOUTH TWICE DAILY 19   Marci Zacarias MD   pravastatin (PRAVACHOL) 40 mg tablet TAKE 1 TABLET BY MOUTH EVERY EVENING 19   Marci Zacarias MD   warfarin (COUMADIN) 5 mg tablet TAKE 2 TABLET BY MOUTH FOR 6 DAYS A WEEK AND TAKE 1 TABLET BY MOUTH 1 DAY OF THE WEEK 19   Marci Zacarias MD   lisinopril (PRINIVIL, ZESTRIL) 20 mg tablet TAKE 1 TABLET BY MOUTH TWICE DAILY FOR HIGH BLOOD PRESSURE 19   Marci Zacarias MD   amLODIPine (NORVASC) 10 mg tablet TAKE 1 TABLET BY MOUTH ONCE DAILY 5/15/19   Marci Zacarias MD   carvedilol (COREG) 25 mg tablet TAKE 1 TABLET BY MOUTH TWICE DAILY WITH MEALS 19   Marci Zacarias MD MULTIVITS-MINERALS/FA/LYCOPENE (MEN'S DAILY PO) Take 1 Tab by mouth daily. Provider, Historical   aspirin 81 mg tablet Take 81 mg by mouth. TAKE AM OF SURGERY WITH SMALL SIP OF WATER    Provider, Historical     No Known Allergies     Review of Systems:  Pertinent items are noted in the History of Present Illness. Lab Results   Component Value Date/Time    Hemoglobin A1c 6.2 (H) 08/22/2017 10:14 AM        Immunization History   Administered Date(s) Administered    Influenza High Dose Vaccine PF 09/14/2016    Influenza Vaccine 10/01/2014    Influenza Vaccine (Quad) 03/03/2019    Influenza Vaccine (Quad) Mdck Pf 09/21/2017    Influenza Vaccine PF 09/29/2015    Influenza Vaccine Whole 11/03/2011    Pneumococcal Conjugate (PCV-13) 08/22/2017    Pneumococcal Polysaccharide (PPSV-23) 03/22/2004    Pneumococcal Vaccine (Pcv) 10/01/2005, 10/20/2010    TB Skin Test (PPD) Intradermal 02/26/2015, 01/11/2019    Tdap 07/12/2019       Body mass index is 40.14 kg/m². Counseling regarding nutrition done: Yes Pateint counsled about risks of smoking and cessation      Current medications:  Current Outpatient Medications   Medication Sig Dispense Refill    hydrocortisone (HYTONE) 2.5 % topical cream Apply  to affected area two (2) times a day. use thin layer 28 g 11    traMADol (ULTRAM) 50 mg tablet Take 50 mg by mouth every six (6) hours as needed for Pain.       furosemide (LASIX) 40 mg tablet TAKE 1 TABLET BY MOUTH TWICE DAILY 180 Tab 3    pravastatin (PRAVACHOL) 40 mg tablet TAKE 1 TABLET BY MOUTH EVERY EVENING 90 Tab 3    warfarin (COUMADIN) 5 mg tablet TAKE 2 TABLET BY MOUTH FOR 6 DAYS A WEEK AND TAKE 1 TABLET BY MOUTH 1 DAY OF THE WEEK 60 Tab 11    lisinopril (PRINIVIL, ZESTRIL) 20 mg tablet TAKE 1 TABLET BY MOUTH TWICE DAILY FOR HIGH BLOOD PRESSURE 180 Tab 3    amLODIPine (NORVASC) 10 mg tablet TAKE 1 TABLET BY MOUTH ONCE DAILY 90 Tab 3    carvedilol (COREG) 25 mg tablet TAKE 1 TABLET BY MOUTH TWICE DAILY WITH MEALS 180 Tab 3    MULTIVITS-MINERALS/FA/LYCOPENE (MEN'S DAILY PO) Take 1 Tab by mouth daily.  aspirin 81 mg tablet Take 81 mg by mouth. TAKE AM OF SURGERY WITH SMALL SIP OF WATER           Objective:     Physical Exam:     General: well developed, well nourished, pleasant , NAD. Hygiene good  Psych: cooperative. Pleasant. No anxiety or depression. Normal mood and affect. Neuro: alert and oriented to person/place/situation. Otherwise nonfocal.  Derm: Normal turgor for age, dry skin  HEENT: Normocephalic, atraumatic  Neck: Normal range of motion  Chest: Good air entry bilaterally  Cardio: Normal heart sounds  Abdomen: Soft, nontender  Lower extremities: color normal; temperature normal.   Unstable gait. Data Review:   No results found for this or any previous visit (from the past 24 hour(s)). Assessment:     Good local wound care  Edema management  Nutrition optimization  Good Diabetic control    Plan:     Continue endoform and hydrafera with compression.  Decrease frequency TF.

## 2019-08-21 ENCOUNTER — ANESTHESIA EVENT (OUTPATIENT)
Dept: SURGERY | Age: 79
End: 2019-08-21
Payer: MEDICARE

## 2019-08-21 RX ORDER — SODIUM CHLORIDE, SODIUM LACTATE, POTASSIUM CHLORIDE, CALCIUM CHLORIDE 600; 310; 30; 20 MG/100ML; MG/100ML; MG/100ML; MG/100ML
100 INJECTION, SOLUTION INTRAVENOUS CONTINUOUS
Status: CANCELLED | OUTPATIENT
Start: 2019-08-21

## 2019-08-21 RX ORDER — SODIUM CHLORIDE 0.9 % (FLUSH) 0.9 %
5-40 SYRINGE (ML) INJECTION EVERY 8 HOURS
Status: CANCELLED | OUTPATIENT
Start: 2019-08-21

## 2019-08-21 RX ORDER — SODIUM CHLORIDE 0.9 % (FLUSH) 0.9 %
5-40 SYRINGE (ML) INJECTION AS NEEDED
Status: CANCELLED | OUTPATIENT
Start: 2019-08-21

## 2019-08-21 NOTE — PROGRESS NOTES
Patient pre-assessment complete for BIV ICD generator change with Dr Jon Marion scheduled for 19 at 2pm, arrival time 12pm. Patient verified using . Patient instructed to bring all home medications in labeled bottles on the day of procedure. NPO status reinforced. Patient instructed to HOLD coumadin tonight & HOLD lasix & lisinopril in am. Instructed they can take all other medications excluding vitamins & supplements. Patient verbalizes understanding of all instructions & denies any questions at this time.

## 2019-08-22 ENCOUNTER — ANESTHESIA (OUTPATIENT)
Dept: SURGERY | Age: 79
End: 2019-08-22
Payer: MEDICARE

## 2019-08-22 ENCOUNTER — HOSPITAL ENCOUNTER (OUTPATIENT)
Age: 79
Setting detail: OUTPATIENT SURGERY
Discharge: HOME | End: 2019-08-22
Attending: INTERNAL MEDICINE | Admitting: INTERNAL MEDICINE
Payer: MEDICARE

## 2019-08-22 ENCOUNTER — APPOINTMENT (OUTPATIENT)
Dept: CARDIAC CATH/INVASIVE PROCEDURES | Age: 79
End: 2019-08-22
Payer: MEDICARE

## 2019-08-22 VITALS
HEART RATE: 78 BPM | DIASTOLIC BLOOD PRESSURE: 68 MMHG | RESPIRATION RATE: 19 BRPM | HEIGHT: 73 IN | WEIGHT: 270 LBS | SYSTOLIC BLOOD PRESSURE: 159 MMHG | OXYGEN SATURATION: 93 % | TEMPERATURE: 98 F | BODY MASS INDEX: 35.78 KG/M2

## 2019-08-22 LAB
ANION GAP SERPL CALC-SCNC: 7 MMOL/L (ref 7–16)
ATRIAL RATE: 90 BPM
BUN SERPL-MCNC: 26 MG/DL (ref 8–23)
CALCIUM SERPL-MCNC: 9.2 MG/DL (ref 8.3–10.4)
CALCULATED R AXIS, ECG10: -53 DEGREES
CALCULATED T AXIS, ECG11: 129 DEGREES
CHLORIDE SERPL-SCNC: 104 MMOL/L (ref 98–107)
CO2 SERPL-SCNC: 27 MMOL/L (ref 21–32)
CREAT SERPL-MCNC: 1.53 MG/DL (ref 0.8–1.5)
DIAGNOSIS, 93000: NORMAL
ERYTHROCYTE [DISTWIDTH] IN BLOOD BY AUTOMATED COUNT: 14.4 % (ref 11.9–14.6)
GLUCOSE SERPL-MCNC: 109 MG/DL (ref 65–100)
HCT VFR BLD AUTO: 43.9 % (ref 41.1–50.3)
HGB BLD-MCNC: 14.1 G/DL (ref 13.6–17.2)
INR PPP: 1.8
MAGNESIUM SERPL-MCNC: 2.3 MG/DL (ref 1.8–2.4)
MCH RBC QN AUTO: 30.7 PG (ref 26.1–32.9)
MCHC RBC AUTO-ENTMCNC: 32.1 G/DL (ref 31.4–35)
MCV RBC AUTO: 95.6 FL (ref 79.6–97.8)
NRBC # BLD: 0 K/UL (ref 0–0.2)
PLATELET # BLD AUTO: 211 K/UL (ref 150–450)
PMV BLD AUTO: 10.6 FL (ref 9.4–12.3)
POTASSIUM SERPL-SCNC: 5 MMOL/L (ref 3.5–5.1)
PROTHROMBIN TIME: 21.3 SEC (ref 11.7–14.5)
Q-T INTERVAL, ECG07: 448 MS
QRS DURATION, ECG06: 158 MS
QTC CALCULATION (BEZET), ECG08: 490 MS
RBC # BLD AUTO: 4.59 M/UL (ref 4.23–5.6)
SODIUM SERPL-SCNC: 138 MMOL/L (ref 136–145)
VENTRICULAR RATE, ECG03: 72 BPM
WBC # BLD AUTO: 9.1 K/UL (ref 4.3–11.1)

## 2019-08-22 PROCEDURE — 80048 BASIC METABOLIC PNL TOTAL CA: CPT

## 2019-08-22 PROCEDURE — 33263 RMVL & RPLCMT DFB GEN 2 LEAD: CPT

## 2019-08-22 PROCEDURE — 74011250636 HC RX REV CODE- 250/636: Performed by: INTERNAL MEDICINE

## 2019-08-22 PROCEDURE — 74011000250 HC RX REV CODE- 250: Performed by: INTERNAL MEDICINE

## 2019-08-22 PROCEDURE — 74011250636 HC RX REV CODE- 250/636

## 2019-08-22 PROCEDURE — 77030012935 HC DRSG AQUACEL BMS -B

## 2019-08-22 PROCEDURE — 85610 PROTHROMBIN TIME: CPT

## 2019-08-22 PROCEDURE — 77030028698 HC BLD TISS PLSM MEDT -D

## 2019-08-22 PROCEDURE — 77030022704 HC SUT VLOC COVD -B

## 2019-08-22 PROCEDURE — 74011000272 HC RX REV CODE- 272: Performed by: INTERNAL MEDICINE

## 2019-08-22 PROCEDURE — 77030031139 HC SUT VCRL2 J&J -A

## 2019-08-22 PROCEDURE — C1721 AICD, DUAL CHAMBER: HCPCS

## 2019-08-22 PROCEDURE — 93005 ELECTROCARDIOGRAM TRACING: CPT | Performed by: INTERNAL MEDICINE

## 2019-08-22 PROCEDURE — 74011250636 HC RX REV CODE- 250/636: Performed by: ANESTHESIOLOGY

## 2019-08-22 PROCEDURE — 85027 COMPLETE CBC AUTOMATED: CPT

## 2019-08-22 PROCEDURE — 76060000033 HC ANESTHESIA 1 TO 1.5 HR: Performed by: INTERNAL MEDICINE

## 2019-08-22 PROCEDURE — 83735 ASSAY OF MAGNESIUM: CPT

## 2019-08-22 PROCEDURE — 74011000250 HC RX REV CODE- 250

## 2019-08-22 PROCEDURE — 77030008462 HC STPLR SKN PROX J&J -A

## 2019-08-22 RX ORDER — LIDOCAINE HYDROCHLORIDE AND EPINEPHRINE 10; 10 MG/ML; UG/ML
1.5 INJECTION, SOLUTION INFILTRATION; PERINEURAL
Status: DISCONTINUED | OUTPATIENT
Start: 2019-08-22 | End: 2019-08-22

## 2019-08-22 RX ORDER — PROPOFOL 10 MG/ML
INJECTION, EMULSION INTRAVENOUS
Status: DISCONTINUED | OUTPATIENT
Start: 2019-08-22 | End: 2019-08-22 | Stop reason: HOSPADM

## 2019-08-22 RX ORDER — EPHEDRINE SULFATE 50 MG/ML
INJECTION, SOLUTION INTRAVENOUS AS NEEDED
Status: DISCONTINUED | OUTPATIENT
Start: 2019-08-22 | End: 2019-08-22 | Stop reason: HOSPADM

## 2019-08-22 RX ORDER — PROPOFOL 10 MG/ML
INJECTION, EMULSION INTRAVENOUS AS NEEDED
Status: DISCONTINUED | OUTPATIENT
Start: 2019-08-22 | End: 2019-08-22 | Stop reason: HOSPADM

## 2019-08-22 RX ORDER — BUPIVACAINE HYDROCHLORIDE AND EPINEPHRINE 5; 5 MG/ML; UG/ML
30 INJECTION, SOLUTION PERINEURAL ONCE
Status: COMPLETED | OUTPATIENT
Start: 2019-08-22 | End: 2019-08-22

## 2019-08-22 RX ORDER — SODIUM CHLORIDE, SODIUM LACTATE, POTASSIUM CHLORIDE, CALCIUM CHLORIDE 600; 310; 30; 20 MG/100ML; MG/100ML; MG/100ML; MG/100ML
100 INJECTION, SOLUTION INTRAVENOUS CONTINUOUS
Status: DISCONTINUED | OUTPATIENT
Start: 2019-08-22 | End: 2019-08-22 | Stop reason: HOSPADM

## 2019-08-22 RX ORDER — LIDOCAINE HYDROCHLORIDE 20 MG/ML
INJECTION, SOLUTION EPIDURAL; INFILTRATION; INTRACAUDAL; PERINEURAL AS NEEDED
Status: DISCONTINUED | OUTPATIENT
Start: 2019-08-22 | End: 2019-08-22 | Stop reason: HOSPADM

## 2019-08-22 RX ORDER — CEFAZOLIN SODIUM/WATER 2 G/20 ML
2 SYRINGE (ML) INTRAVENOUS
Status: COMPLETED | OUTPATIENT
Start: 2019-08-22 | End: 2019-08-22

## 2019-08-22 RX ADMIN — Medication 3 G: at 14:47

## 2019-08-22 RX ADMIN — BUPIVACAINE HYDROCHLORIDE AND EPINEPHRINE BITARTRATE 150 MG: 5; .005 INJECTION, SOLUTION PERINEURAL at 14:58

## 2019-08-22 RX ADMIN — PROPOFOL 40 MG: 10 INJECTION, EMULSION INTRAVENOUS at 14:48

## 2019-08-22 RX ADMIN — SODIUM CHLORIDE, SODIUM LACTATE, POTASSIUM CHLORIDE, AND CALCIUM CHLORIDE: 600; 310; 30; 20 INJECTION, SOLUTION INTRAVENOUS at 14:42

## 2019-08-22 RX ADMIN — EPHEDRINE SULFATE 10 MG: 50 INJECTION, SOLUTION INTRAVENOUS at 15:12

## 2019-08-22 RX ADMIN — NEOMYCIN AND POLYMYXIN B SULFATES: 40; 200000 SOLUTION IRRIGATION at 15:03

## 2019-08-22 RX ADMIN — PROPOFOL 100 MCG/KG/MIN: 10 INJECTION, EMULSION INTRAVENOUS at 14:48

## 2019-08-22 RX ADMIN — Medication 3 G: at 17:49

## 2019-08-22 RX ADMIN — LIDOCAINE HYDROCHLORIDE 40 MG: 20 INJECTION, SOLUTION EPIDURAL; INFILTRATION; INTRACAUDAL; PERINEURAL at 14:47

## 2019-08-22 RX ADMIN — EPHEDRINE SULFATE 10 MG: 50 INJECTION, SOLUTION INTRAVENOUS at 15:30

## 2019-08-22 NOTE — PROGRESS NOTES
Report received from [facility]. Procedural findings communicated. Intra procedural  medication administration reviewed. Progression of care discussed. Patient received into [facility] [place] [facility] post procedure.      Incision site without bleeding or swelling yes    Dressing dry and intact yes    Patient instructed to limit movement to left upper extremity    Routine post procedural vital signs and site assessment initiated yes

## 2019-08-22 NOTE — ANESTHESIA PREPROCEDURE EVALUATION
Anesthetic History   No history of anesthetic complications            Review of Systems / Medical History  Patient summary reviewed and pertinent labs reviewed    Pulmonary    COPD: moderate    Sleep apnea: CPAP           Neuro/Psych   Within defined limits           Cardiovascular    Hypertension: well controlled          Pacemaker, CAD and CABG (2012)    Exercise tolerance: <4 METS  Comments: ECHO 1/19 - EF 40% with SONIA 1.4   GI/Hepatic/Renal  Within defined limits              Endo/Other        Obesity and arthritis     Other Findings   Comments: Factor 5 Leiden mutation         Physical Exam    Airway  Mallampati: II  TM Distance: 4 - 6 cm  Neck ROM: normal range of motion   Mouth opening: Normal     Cardiovascular  Regular rate and rhythm,  S1 and S2 normal,  no murmur, click, rub, or gallop  Rhythm: regular  Rate: normal         Dental    Dentition: Full lower dentures and Full upper dentures     Pulmonary  Breath sounds clear to auscultation               Abdominal         Other Findings            Anesthetic Plan    ASA: 4  Anesthesia type: total IV anesthesia          Induction: Intravenous  Anesthetic plan and risks discussed with: Patient

## 2019-08-22 NOTE — DISCHARGE INSTRUCTIONS
If you do not have an appointment time called to you please call the following number [phone] and ask for a follow-up in 10-14 days for a generator change of a [employer] ICD. Pacemaker Battery Change Out    Keep your incision dry for 14 days. DO NOT put any lotions, creams, powders, ointments over your incision for 2 weeks. You may remove your bandage after 3 days. If you have strips of tape over your incision please DO NOT remove them. These will fall off on their own. If you have staples or stitches these will be removed at your follow-up appointment. If your incision becomes very red, swollen, there is drainage coming out of the incision, tender, or you have a fever greater than 101 please contact your doctor immediately. You may use your pacemaker arm but DO NOT raise the arm higher than your shoulder for the first 2 weeks so that your incision can heal.    DO NOT lift more than 5-10 pounds for 2 weeks and 20 pounds for one month. DO NOT push or pull with the pacemaker arm for 2 weeks. Microwaves will not harm your pacemaker. Remember to keep your pacemaker card with you at all times. Within 6 weeks you should receive your permanent card. Keep your temporary card as a spare. If you do not receive your permanent card or lose your card please contact your doctor. At airports, always show your pacemaker card. You may walk through the metal detectors, but DO NOT allow the hand held wand near your pacemaker. Be sure to talk with your doctor before having an MRI. If you need to change the follow up appointment that has been made for you please contact your doctor's office.

## 2019-08-22 NOTE — PROGRESS NOTES
Patient up to bedside, vital signs stable. Patient ambulated to bathroom without difficulty. Patient voided without difficulty. Discharge instructions and home medications reviewed with patient. Time allowed for questions and answers. 1810  Peripheral IV site dc'd without difficulty with tip intact. 1815 Patient discharged to home with family.

## 2019-08-22 NOTE — PROGRESS NOTES
Patient received to [address] [place] [address] room # 9  Via wheelchair from [facility]. Patient scheduled for gen change today with Dr Juarez White. Procedure reviewed & questions answered, voiced good understanding consent obtained & placed on chart. All medications and medical history reviewed. Will prep patient per orders. Patient & family updated on plan of care. The patient has a fraility score of 4-VULNERABLE, based on patient A&Ox3, patient requires wheelchair assistance to prep room.

## 2019-08-22 NOTE — ANESTHESIA POSTPROCEDURE EVALUATION
Procedure(s):  BIV ICD GEN CHANGE.    total IV anesthesia    Anesthesia Post Evaluation      Multimodal analgesia: multimodal analgesia used between 6 hours prior to anesthesia start to PACU discharge  Patient location during evaluation: bedside  Patient participation: complete - patient participated  Level of consciousness: awake and alert  Pain score: 1  Pain management: adequate  Airway patency: patent  Anesthetic complications: no  Cardiovascular status: acceptable  Respiratory status: acceptable  Hydration status: acceptable  Comments: Patient doing well. Continue care on floor. Post anesthesia nausea and vomiting:  none      Vitals Value Taken Time   BP     Temp     Pulse 74 8/22/2019  4:18 PM   Resp 31 8/22/2019  4:18 PM   SpO2 95 % 8/22/2019  4:18 PM   Vitals shown include unvalidated device data.

## 2024-02-13 NOTE — PROGRESS NOTES
Dr. Chisholm,    Pt just scheduled a colposcopy with you for 4/25/24. Pt told me that the last time she had a colposcopy she had a vasovagal response and she needed to have a family member pick her up from her appt. She became lightheaded and her vision went black and this took a long time to resolve. Is there anything we would do for her prior to the day of the procedure? Please advise.     Thanks!    Melissa Atkins RN  Pap Tracking      Hospitalist Progress Note Admit Date:  1/10/2019  4:02 PM  
Name:  George Antoine Age:  77 y.o. 
:  1940 MRN:  [id number] PCP:  Yogesh Lopez MD 
Treatment Team: Attending Provider: Darío Mueller MD; Consulting Provider: Corry Mohamud MD; Utilization Review: Miranda Pa RN Subjective:  
67-year-old male history of coronary artery disease, ischemic cardipmyopathy, hypertension, factor V Leiden, CABG, AICD for nonsustained V. tach, phlebitis, DVT, paroxysmal atrial fibrillation, aortic stenosis presents from his primary care office for pneumonia. Pt does have chronic sob on exertion since cabg- several years ago. Since past 2 weeks- nasal congestion,increased sob-- no fever or headache or dizziness or chest pain. Went to pcp today for his INR check- daughter complained that pt was looking ill- wanted pcp to evaluate- found to be hypoxic - oxygen sat 83%- was given breathing treatment- was told that has pneumonia- sent to er for further evaluation. 
  
In er found to have oxygen saturation 89- cxr- cardiomegaly with infiltrate- started in rocephin and zithromax. Mild elevated d-dimer- ordered ct chest with contrast - no pe 
  
Pt will be admitted for acute on chronic sob-secondary to pneumonia. During the stay On the night of admission,pt still being inER, pt oxygen saturation dropped,had to initially on 2lit/min, then 10 lit/min, then non rebreather. Pt Later became sleepy,moaning,responding to painful stimuli. Abg showed ph 7.164,co2 113. Pt was placed on bipap- admitted to ICU. He became more awake,at times confusion and agitation- was given ativan. Able to wean pt of Bipap in day- presently on nasal cannula in a day- requiring bipap at night. Didnot get any ativan after day one for agitation. Pulmonary has been following the pt. CT chest didnt show any PE,did show patchy infiltrate.  Pt was continued on rocephin and zithromax. For his hypercapnic resp failure and wheezing- was also started on steroids- weaning. P t does have a h/o chf ef 40-45% in 2017. Steven Stark started pt in Dimaox for few doses. pt is aso on lasix. With mild decrease in gfr- lasix was held since 1/13/19. Pt had been started on lovenox in addition to coumadin sec to subtherapeutic INR. Pt has chronic constipation- was given lactulose on top of 400mg of colace and 10 mg of dulcolax which he takes daily at home. Constipation resolved. 1/14/19 Pt in chair,says sob better- on nasal cannula Had bowel movement yesterday. Objective:  
 
Patient Vitals for the past 24 hrs: 
 Temp Pulse Resp BP SpO2  
01/14/19 1001  69  94/53 95 % 01/14/19 0826  84  122/63   
01/14/19 0801  77 30 122/63 95 % 01/14/19 0739     96 % 01/14/19 0701 97.8 °F (36.6 °C) 85 27 106/60 98 % 01/14/19 0601  82 (!) 32 96/57 96 % 01/14/19 0504  79 (!) 31 104/59 96 % 01/14/19 0424 98.3 °F (36.8 °C) 72 (!) 51 101/61 97 % 01/14/19 0201  85 28 130/63 95 % 01/14/19 0156     95 % 01/14/19 0101  81 23 108/57 94 % 01/14/19 0001 98.7 °F (37.1 °C) 76 26 105/64 95 % 01/13/19 2301  73 30 105/60 93 % 01/13/19 2201  72 27 108/57 95 % 01/13/19 2101  77 (!) 39 107/58 97 % 01/13/19 2001  71 28 107/56 96 % 01/13/19 1915     96 % 01/13/19 1908 98.4 °F (36.9 °C) 79 (!) 33 100/51 95 % 01/13/19 1801    140/87 95 % 01/13/19 1756  86 21  95 % 01/13/19 1707  74 21  95 % 01/13/19 1703  82 14  95 % 01/13/19 1702  79 25    
01/13/19 1701  84 8 132/70 95 % 01/13/19 1633  72 19  97 % 01/13/19 1602  72 22  97 % 01/13/19 1601  68 20 114/53 93 % 01/13/19 1547     93 % 01/13/19 1535 98.4 °F (36.9 °C) 73 28 111/60 92 % 01/13/19 1521  81 26  92 % 01/13/19 1504  85 10  92 % 01/13/19 1503  78 17  93 % 01/13/19 1501  81 27 111/60 95 % 01/13/19 1453  91 22  (!) 84 % 01/13/19 1401    109/74 (!) 89 % 01/13/19 1356  80 26  91 % 01/13/19 1355  74 25  90 % 01/13/19 1346  74 26  90 % 01/13/19 1345  86 22  90 % 01/13/19 1301  69 27 100/57 90 % 01/13/19 1201  79 29 114/60 92 % 01/13/19 1155  74 26  94 % 01/13/19 1153  80 26  95 % 01/13/19 1150 98.2 °F (36.8 °C) 80 26 114/57 91 % 01/13/19 1149    114/57 91 % 01/13/19 1125     93 % 01/13/19 1120  87 25  93 % Oxygen Therapy O2 Sat (%): 95 % (01/14/19 1001) Pulse via Oximetry: 70 beats per minute (01/14/19 1001) O2 Device: Hi flow nasal cannula (01/14/19 0739) O2 Flow Rate (L/min): 2 l/min (01/14/19 0739) O2 Temperature: 87.8 °F (31 °C) (01/11/19 1155) FIO2 (%): 32 % (01/13/19 1547) Intake/Output Summary (Last 24 hours) at 1/14/2019 1059 Last data filed at 1/14/2019 6546 Gross per 24 hour Intake 2280 ml Output 1850 ml Net 430 ml General:    Well nourished. On nasal cannula 
heent- normal 
CV:   RRR. No murmur, rub, or gallop. Lungs:   Coarse beath sounds Abdomen:   Soft, nontender, nondistended. Cns-awake,alert oriented x3, no focal neurological deficits Extremities: Warm and dry. No cyanosis. bilateral mild  lower ext edema. Skin:     No rashes or jaundice. Data Review: 
I have reviewed all labs, meds, telemetry events, and studies from the last 24 hours. Recent Results (from the past 24 hour(s)) PROTHROMBIN TIME + INR Collection Time: 01/14/19  4:12 AM  
Result Value Ref Range Prothrombin time 15.6 (H) 11.7 - 14.5 sec INR 1.2 METABOLIC PANEL, BASIC Collection Time: 01/14/19  4:12 AM  
Result Value Ref Range Sodium 138 136 - 145 mmol/L Potassium 4.2 3.5 - 5.1 mmol/L Chloride 101 98 - 107 mmol/L  
 CO2 30 21 - 32 mmol/L Anion gap 7 mmol/L Glucose 153 (H) 65 - 100 mg/dL BUN 40 (H) 8 - 23 MG/DL Creatinine 1.34 0.8 - 1.5 MG/DL  
 GFR est AA >60 >60 ml/min/1.73m2 GFR est non-AA 55 ml/min/1.73m2  Calcium 8.1 (L) 8.3 - 10.4 MG/DL  
  
 
 All Micro Results None Current Meds: 
Current Facility-Administered Medications Medication Dose Route Frequency  [START ON 1/15/2019] predniSONE (DELTASONE) tablet 40 mg  40 mg Oral DAILY WITH BREAKFAST  enoxaparin (LOVENOX) injection 40 mg  40 mg SubCUTAneous Q24H  
 albuterol-ipratropium (DUO-NEB) 2.5 MG-0.5 MG/3 ML  3 mL Nebulization Q6H RT  
 pantoprazole (PROTONIX) tablet 40 mg  40 mg Oral ACB  calcium carbonate (TUMS) chewable tablet 200 mg [elemental]  200 mg Oral TID WITH MEALS  
 azithromycin (ZITHROMAX) 500 mg in 0.9% sodium chloride (MBP/ADV) 250 mL  500 mg IntraVENous Q24H  cefTRIAXone (ROCEPHIN) 1 g in 0.9% sodium chloride (MBP/ADV) 50 mL  1 g IntraVENous Q24H  nystatin (MYCOSTATIN) 100,000 unit/gram powder   Topical BID  influenza vaccine 2018-19 (6 mos+)(PF) (FLUARIX QUAD/FLULAVAL QUAD) injection 0.5 mL  0.5 mL IntraMUSCular PRIOR TO DISCHARGE  sodium chloride (NS) flush 10 mL  10 mL InterCATHeter Q8H  
 sodium chloride (NS) flush 10 mL  10 mL InterCATHeter PRN  
 bisacodyl (DULCOLAX) tablet 10 mg  10 mg Oral QHS  docusate sodium (COLACE) capsule 400 mg  400 mg Oral QHS  sodium chloride (NS) flush 5-40 mL  5-40 mL IntraVENous PRN  
 amLODIPine (NORVASC) tablet 10 mg  10 mg Oral DAILY  aspirin delayed-release tablet 81 mg  81 mg Oral DAILY  carvedilol (COREG) tablet 25 mg  25 mg Oral BID WITH MEALS  guaiFENesin ER (MUCINEX) tablet 1,200 mg  1,200 mg Oral BID  
 HYDROcodone-acetaminophen (NORCO)  mg tablet 1 Tab  1 Tab Oral Q6H PRN  
 lisinopril (PRINIVIL, ZESTRIL) tablet 20 mg  20 mg Oral DAILY  pravastatin (PRAVACHOL) tablet 40 mg  40 mg Oral QHS  sodium chloride (NS) flush 5-40 mL  5-40 mL IntraVENous PRN  
 acetaminophen (TYLENOL) tablet 650 mg  650 mg Oral Q4H PRN  
 morphine injection 1 mg  1 mg IntraVENous Q4H PRN  
 naloxone (NARCAN) injection 0.4 mg  0.4 mg IntraVENous PRN  
  ondansetron (ZOFRAN) injection 4 mg  4 mg IntraVENous Q4H PRN  
 hydrALAZINE (APRESOLINE) 20 mg/mL injection 10 mg  10 mg IntraVENous Q6H PRN  
 loratadine (CLARITIN) tablet 10 mg  10 mg Oral DAILY  warfarin (COUMADIN) tablet 5 mg  5 mg Oral QPM  
 
 
Other Studies (last 24 hours): No results found. Assessment and Plan:  
 
Hospital Problems as of 1/14/2019 Date Reviewed: 10/2/2018 Codes Class Noted - Resolved POA Obesity hypoventilation syndrome (HCC) ICD-10-CM: C91.8 ICD-9-CM: 278.03  1/14/2019 - Present Unknown COPD exacerbation ([facility] ICD-10-CM: J44.1 ICD-9-CM: 491.21  1/11/2019 - Present Unknown Hyperkalemia ICD-10-CM: E87.5 ICD-9-CM: 276.7  1/10/2019 - Present Unknown Hypoxia ICD-10-CM: R09.02 
ICD-9-CM: 799.02  1/10/2019 - Present Unknown * (Principal) PNA (pneumonia) ICD-10-CM: J18.9 ICD-9-CM: 941  1/10/2019 - Present Unknown Respiratory failure with hypercapnia ([facility] ICD-10-CM: P99.02 
ICD-9-CM: 518.81  1/10/2019 - Present Unknown Essential hypertension ICD-10-CM: I10 
ICD-9-CM: 401.9  2/6/2017 - Present Yes Chronic systolic congestive heart failure (HCC) ICD-10-CM: I50.22 ICD-9-CM: 428.22, 428.0  10/27/2015 - Present Yes AICD (automatic cardioverter/defibrillator) present ICD-10-CM: Z95.810 ICD-9-CM: V45.02  10/27/2015 - Present Yes Paroxysmal atrial fibrillation (HCC) ICD-10-CM: I48.0 ICD-9-CM: 427.31  10/27/2015 - Present Yes Aortic stenosis, mild to moderate ICD-10-CM: I35.0 ICD-9-CM: 424.1  10/27/2015 - Present Yes Osteoarthritis (Chronic) ICD-10-CM: M19.90 ICD-9-CM: 715.90  2/12/2015 - Present Overview Signed 10/27/2015  9:58 AM by Lorraine Castle With severe chronic low back pain. Chronic back pain (Chronic) ICD-10-CM: M54.9, G89.29 ICD-9-CM: 724.5, 338.29  2/12/2015 - Present Yes Obesity (Chronic) ICD-10-CM: A42.6 ICD-9-CM: 278.00  8/17/2012 - Present Yes Coronary artery disease (Chronic) ICD-10-CM: I25.10 ICD-9-CM: 414.00  8/17/2012 - Present Yes Overview Signed 8/17/2012  3:57 PM by Rosa Thomas Diagnosed 1999, CABG 12/2011 Cardiomyopathy, ischemic (Chronic) ICD-10-CM: I25.5 ICD-9-CM: 414.8  8/17/2012 - Present Yes Overview Signed 8/17/2012  3:58 PM by Rosa Thomas Pacemaker AICD 12/2011,  35% LUEF Factor 5 Leiden mutation, heterozygous ([facility] (Chronic) ICD-10-CM: V91.49 
ICD-9-CM: 289.81  8/17/2012 - Present Yes Overview Signed 8/17/2012  4:02 PM by Rosa Thomas Chronic anticoag., hx DVT Hyperlipidemia (Chronic) ICD-10-CM: K17.7 ICD-9-CM: 272.4  8/17/2012 - Present Yes PLAN:   
Acute hypoxic and hypercapnic resp failure- on bipap at night- now on nasal cannula 
pna- rocephin zithromax Copd exa- steroids CHF- EF 40-45%- mild low gfr- held lasix,finished doses of diamox. Repeat echo. Constipation- resolved after Lactulose. Obesity Sub therapeutic inr- coumadin,lovenox. htn Cad Pulmonary ok for pt to go to floor- as he is a new bipap pt still in ICU. Pt ok to use bipap at night. DC planning/Dispo: DVT ppx:  coumadin Signed: 
Frankey Board, MD

## 2024-09-04 NOTE — PROGRESS NOTES
Internal Medicine Progress Note    Subjective     Chief Complaint   Patient presents with    Weakness    Loss Of Appetite       Interval Events:  HPI patient is awake and alert in the bed,  pubic area pain less, Neg CT scan in pelvic area,  Patient's family at the bedside patient is awake alert oriented.  MRI of the brain shows Rt hemisphere metastasis x2, ate better today . Last night had agitation   requiring haldol and sitter at present patient is awake alert oriented patient was seen in the morning.  Later on patient had liver biopsy from the metastatic lesions which was successful.     Medications  Current Facility-Administered Medications   Medication Dose Route Frequency Provider Last Rate Last Admin    levETIRAcetam ORAL (KepPRA) 100 MG/ML oral solution 500 mg  500 mg Oral 2 times per day Sloan Claros MD   500 mg at 09/03/24 0819    haloperidol lactate (HALDOL) 5 MG/ML short-acting injection 2 mg  2 mg Intravenous Q6H PRN Yovanny Jones MD   2 mg at 09/03/24 0144    diclofenac (VOLTAREN) 1 % gel 2 g  2 g Topical 4x Daily PRN Yovanny Jones MD        dexAMETHasone (DECADRON) injection 4 mg  4 mg Intravenous TID Sloan Claros MD   4 mg at 09/03/24 1309    clonazePAM (KlonoPIN) tablet 0.5 mg  0.5 mg Oral Nightly Yovanny Jones MD   0.5 mg at 09/02/24 1819    polyethylene glycol (MIRALAX) packet 17 g  17 g Oral Daily Yovanny Jones MD        dextrose 50 % injection 25 g  25 g Intravenous PRN Yovanny Jones MD        dextrose 50 % injection 12.5 g  12.5 g Intravenous PRN Yovanny Jones MD        glucagon (GLUCAGEN) injection 1 mg  1 mg Intramuscular PRN Yovnany Jones MD        dextrose (GLUTOSE) 40 % gel 15 g  15 g Oral PRN Yovanny Jones MD        dextrose (GLUTOSE) 40 % gel 30 g  30 g Oral PRN Yovanny Jones MD        insulin lispro (ADMELOG,HumaLOG) - Correction Dose   Subcutaneous TID WC Yovanny Jones MD   2 Units at 09/03/24 1713    tamsulosin (FLOMAX) capsule 0.4 mg  0.4 mg Oral Daily PC  Anesthesia Pre Eval Note    Anesthesia ROS/Med Hx        Anesthetic Complication History:    Patient does not have a history of anesthetic complications      Pulmonary Review:  Patient does not have a pulmonary history      Neuro/Psych Review:  Patient does not have a neuro/psych history         Cardiovascular Review:     Negative for CHF  Negative for cardiomyopathy  Negative for past MI  Negative for CAD    Positive for cardiac stents  Negative for angina  Negative for dysrhythmias  Positive for hypertension - well controlled  Positive for hyperlipidemia    GI/HEPATIC/RENAL Review:     Negative for GERDNegative for liver disease  Negative for renal disease    End/Other Review:  Negative for diabetes  Positive for obesity class I - 30.00 - 34.99  Negative for hypothyroidism  Negative for hyperthyroidism      Relevant Problems   No relevant active problems       Physical Exam     Airway   Mallampati: II  TM Distance: >3 FB  Neck ROM: Full  Neck: Non-tender and Able to place in sniff position  TMJ Mobility: Good    Cardiovascular  Cardiovascular exam normal  Cardio Rhythm: Regular  Cardio Rate: Normal    Head Assessment  Head assessment: Normocephalic and Atraumatic    General Assessment  General Assessment: Alert and oriented and No acute distress    Dental Exam  Dental exam normal  Patient has:  Upper dentures    Pulmonary Exam  Pulmonary exam normal  Breath sounds clear to auscultation:  Yes    Abdominal Exam  Abdominal exam normal      Anesthesia Plan:    ASA Status: 2  Anesthesia Type: MAC    Induction: Intravenous  Maintenance: TIVA    Post-op Pain Management: Per Surgeon      Checklist  Reviewed: NPO Status, Allergies, Medications, Problem list, Past Med History and Patient Summary  Consent/Risks Discussed Statement:  The proposed anesthetic plan, including its risks and benefits, have been discussed with the Patient along with the risks and benefits of alternatives. Questions were encouraged and answered and  Bedside report from [facility], [facility]. Pt alert and oriented 
vss on monitor with noted htn Pt denies needs at this time Will continue care and assessment. Yovanny Jones MD   0.4 mg at 09/03/24 0820    phenazopyridine (PYRIDIUM) tablet 200 mg  200 mg Oral TID WC Yovanny Jones MD   200 mg at 09/03/24 1713    traMADol (ULTRAM) tablet 50 mg  50 mg Oral Q6H PRN Yovanny Jones MD   50 mg at 09/03/24 0607    [Held by provider] megestrol (MEGACE) 40 mg/mL suspension 400 mg  400 mg Oral BID Sloan Claros MD   400 mg at 09/02/24 0836    sodium chloride 0.9% infusion   Intravenous Continuous Yovanny Jones MD 50 mL/hr at 09/03/24 1831 Rate Verify at 09/03/24 1831    [Held by provider] apixaBAN (ELIQUIS) tablet 5 mg  5 mg Oral 2 times per day Yovanny Jones MD   5 mg at 08/30/24 2218    brimonidine (ALPHAGAN) 0.2 % ophthalmic solution 1 drop  1 drop Both Eyes BID Yovanny Jones MD   1 drop at 09/03/24 0821    pantoprazole (PROTONIX) EC tablet 40 mg  40 mg Oral QAM AC Yovanny Jones MD   40 mg at 09/03/24 0607    hydrALAZINE (APRESOLINE) tablet 50 mg  50 mg Oral TID Yovanny Jones MD   50 mg at 09/03/24 1309    empagliflozin (JARDIANCE) tablet 10 mg  10 mg Oral Every Other Day Yovanny Jones MD   10 mg at 09/03/24 0820    latanoprost (XALATAN) 0.005 % ophthalmic solution 1 drop  1 drop Both Eyes Nightly Yovanny Jones MD   1 drop at 09/02/24 2055    losartan (COZAAR) tablet 50 mg  50 mg Oral Daily Yovanny Jones MD   50 mg at 09/03/24 0820    [Held by provider] traZODone (DESYREL) tablet 50 mg  50 mg Oral Nightly Yovanny Jones MD   50 mg at 09/01/24 2019    fluticasone-umeclidin-vilanterol (TRELEGY ELLIPTA) 100-62.5-25 MCG/ACT inhaler 1 puff  1 puff Inhalation Daily Resp Yovanny Jones MD   1 puff at 09/03/24 0759    rOPINIRole (REQUIP) tablet 4 mg  4 mg Oral Nightly Yovanny Jones MD   4 mg at 09/02/24 2050         Review of Systems  Patient is awake and alert hard of hearing in the bed complaining of dysuria  HEENT not congested  Neck no masses no lymph nodes  Lungs as per history  Heart history of the coronary artery disease atrial fibrillation on  the patient and/or representative understands and agrees to proceed.    I have discussed elements of the patient's history or examination, as noted above and/or as follows, that place the patient at higher risk of complications; BMI> 30 (obesity) and age.    I discussed with the patient (and/or patient's legal representative) the risks and benefits of the proposed anesthesia plan, MAC, which may include services performed by other anesthesia providers.    Alternative anesthesia plans, if available, were reviewed with the patient (and/or patient's legal representative). Discussion has been held with the patient (and/or patient's legal representative) regarding risks of anesthesia, which include Intra-operative Awareness, conversion to general anesthesia, death, dental injury, hypotension, intra-operative awareness, memory loss, nausea, need for blood transfusion, organ damage, post-op intubation, sore throat and vomiting and emergent situations that may require change in anesthesia plan.    The patient (and/or patient's legal representative) has indicated understanding, his/her questions have been answered, and he/she wishes to proceed with the planned anesthetic.    Blood Products: Not Anticipated     anticoagulants  Extremities no swelling feels weak  CNS no TIA stroke  Psychiatric denies history of depression       Objective   Vitals with min/max:      Vital Last Value 24 Hour Range   Temperature 98.2 °F (36.8 °C) (09/03/24 1704) Temp  Min: 97 °F (36.1 °C)  Max: 99 °F (37.2 °C)   Pulse 96 (09/03/24 1704) Pulse  Min: 85  Max: 106   Respiratory 18 (09/03/24 1704) Resp  Min: 11  Max: 20   Non-Invasive  Blood Pressure (!) 143/63 (09/03/24 1704) BP  Min: 129/69  Max: 159/63   Pulse Oximetry 92 % (09/03/24 1704) SpO2  Min: 84 %  Max: 100 %   Arterial   Blood Pressure   No data recorded        Physical examination  Awake alert oriented sick looking male  Neck no masses  Lungs decreased breath sounds rhonchi bilaterally  Heart irregular rhythm S1-S2  Abdomen soft nontender   no tenderness on the scrotum or penis  Extremities thinning of the muscles no swelling  CNS no focal weakness  Psychiatric appropriate response flat affect       Labs     Admission on 08/30/2024   Component Date Value Ref Range Status    Ventricular Rate EKG/Min (BPM) 08/30/2024 76   Final    Atrial Rate (BPM) 08/30/2024 76   Final    CO-Interval (MSEC) 08/30/2024 148   Final    QRS-Interval (MSEC) 08/30/2024 98   Final    QT-Interval (MSEC) 08/30/2024 378   Final    QTc 08/30/2024 425   Final    P Axis (Degrees) 08/30/2024 53   Final    R Axis (Degrees) 08/30/2024 52   Final    T Axis (Degrees) 08/30/2024 74   Final    REPORT TEXT 08/30/2024    Final                    Value:Sinus rhythm  with marked sinus arrhythmia  Otherwise normal ECG  When compared with ECG of  29-DEC-2019 07:20,  No significant change was found  Confirmed by JOE GLEZ, GERA ([id number]) on 8/30/2024 1:35:50 PM      Sodium 08/30/2024 134 (L)  135 - 145 mmol/L Final    Potassium 08/30/2024 5.2 (H)  3.4 - 5.1 mmol/L Final    Chloride 08/30/2024 101  97 - 110 mmol/L Final    Carbon Dioxide 08/30/2024 20 (L)  21 - 32 mmol/L Final    Anion Gap 08/30/2024 18  7 - 19 mmol/L Final     Glucose 08/30/2024 169 (H)  70 - 99 mg/dL Final    BUN 08/30/2024 43 (H)  6 - 20 mg/dL Final    Creatinine 08/30/2024 1.20 (H)  0.67 - 1.17 mg/dL Final    Glomerular Filtration Rate 08/30/2024 59 (L)  >=60 Final    eGFR 30-59 mL/min/1.73m2 = Moderate decrease in kidney function. Stage 3 CKD (chronic kidney disease) or moderate kidney disease. Estimated GFR calculated using the CKD-EPI-R (2021) equation that does not include race in the creatinine calculation.    BUN/Cr 08/30/2024 36 (H)  7 - 25 Final    Calcium 08/30/2024 9.6  8.4 - 10.2 mg/dL Final    Bilirubin, Total 08/30/2024 0.6  0.2 - 1.0 mg/dL Final    GOT/AST 08/30/2024 207 (H)  <=37 Units/L Final    GPT/ALT 08/30/2024 122 (H)  <64 Units/L Final    Alkaline Phosphatase 08/30/2024 508 (H)  45 - 117 Units/L Final    Albumin 08/30/2024 3.1 (L)  3.6 - 5.1 g/dL Final    Protein, Total 08/30/2024 7.4  6.4 - 8.2 g/dL Final    Globulin 08/30/2024 4.3 (H)  2.0 - 4.0 g/dL Final    A/G Ratio 08/30/2024 0.7 (L)  1.0 - 2.4 Final    COLOR, URINALYSIS 08/30/2024 Yellow   Final    APPEARANCE, URINALYSIS 08/30/2024 Clear   Final    GLUCOSE, URINALYSIS 08/30/2024 300 (A)  Negative mg/dL Final    BILIRUBIN, URINALYSIS 08/30/2024 Negative  Negative Final    KETONES, URINALYSIS 08/30/2024 Negative  Negative mg/dL Final    SPECIFIC GRAVITY, URINALYSIS 08/30/2024 1.020  1.005 - 1.030 Final    OCCULT BLOOD, URINALYSIS 08/30/2024 Negative  Negative Final    PH, URINALYSIS 08/30/2024 5.0  5.0 - 7.0 Final    PROTEIN, URINALYSIS 08/30/2024 Trace (A)  Negative mg/dL Final    UROBILINOGEN, URINALYSIS 08/30/2024 0.2  0.2, 1.0 mg/dL Final    NITRITE, URINALYSIS 08/30/2024 Negative  Negative Final    LEUKOCYTE ESTERASE, URINALYSIS 08/30/2024 Negative  Negative Final    WBC 08/30/2024 14.5 (H)  4.2 - 11.0 K/mcL Final    RBC 08/30/2024 4.39 (L)  4.50 - 5.90 mil/mcL Final    HGB 08/30/2024 13.5  13.0 - 17.0 g/dL Final    HCT 08/30/2024 41.9  39.0 - 51.0 % Final    MCV 08/30/2024 95.4  78.0  - 100.0 fl Final    MCH 08/30/2024 30.8  26.0 - 34.0 pg Final    MCHC 08/30/2024 32.2  32.0 - 36.5 g/dL Final    RDW-CV 08/30/2024 13.0  11.0 - 15.0 % Final    RDW-SD 08/30/2024 45.5  39.0 - 50.0 fL Final    PLT 08/30/2024 280  140 - 450 K/mcL Final    NRBC 08/30/2024 0  <=0 /100 WBC Final    Neutrophil, Percent 08/30/2024 83  % Final    Lymphocytes, Percent 08/30/2024 9  % Final    Mono, Percent 08/30/2024 7  % Final    Eosinophils, Percent 08/30/2024 0  % Final    Basophils, Percent 08/30/2024 0  % Final    Immature Granulocytes 08/30/2024 1  % Final    Absolute Neutrophils 08/30/2024 11.9 (H)  1.8 - 7.7 K/mcL Final    Absolute Lymphocytes 08/30/2024 1.3  1.0 - 4.0 K/mcL Final    Absolute Monocytes 08/30/2024 1.0 (H)  0.3 - 0.9 K/mcL Final    Absolute Eosinophils  08/30/2024 0.0  0.0 - 0.5 K/mcL Final    Absolute Basophils 08/30/2024 0.1  0.0 - 0.3 K/mcL Final    Absolute Immature Granulocytes 08/30/2024 0.2  0.0 - 0.2 K/mcL Final    Sodium 09/01/2024 140  135 - 145 mmol/L Final    Potassium 09/01/2024 4.6  3.4 - 5.1 mmol/L Final    Chloride 09/01/2024 107  97 - 110 mmol/L Final    Carbon Dioxide 09/01/2024 19 (L)  21 - 32 mmol/L Final    Anion Gap 09/01/2024 19  7 - 19 mmol/L Final    Glucose 09/01/2024 150 (H)  70 - 99 mg/dL Final    BUN 09/01/2024 31 (H)  6 - 20 mg/dL Final    Creatinine 09/01/2024 1.06  0.67 - 1.17 mg/dL Final    Glomerular Filtration Rate 09/01/2024 68  >=60 Final    eGFR results = or >60 mL/min/1.73m2 = Normal kidney function. Estimated GFR calculated using the CKD-EPI-R (2021) equation that does not include race in the creatinine calculation.    BUN/Cr 09/01/2024 29 (H)  7 - 25 Final    Calcium 09/01/2024 9.1  8.4 - 10.2 mg/dL Final    WBC 09/01/2024 13.5 (H)  4.2 - 11.0 K/mcL Final    RBC 09/01/2024 3.99 (L)  4.50 - 5.90 mil/mcL Final    HGB 09/01/2024 12.2 (L)  13.0 - 17.0 g/dL Final    HCT 09/01/2024 37.5 (L)  39.0 - 51.0 % Final    MCV 09/01/2024 94.0  78.0 - 100.0 fl Final    MCH  09/01/2024 30.6  26.0 - 34.0 pg Final    MCHC 09/01/2024 32.5  32.0 - 36.5 g/dL Final    PLT 09/01/2024 232  140 - 450 K/mcL Final    RDW-CV 09/01/2024 13.2  11.0 - 15.0 % Final    RDW-SD 09/01/2024 45.6  39.0 - 50.0 fL Final    NRBC 09/01/2024 0  <=0 /100 WBC Final    Sodium 09/02/2024 142  135 - 145 mmol/L Final    Potassium 09/02/2024 4.2  3.4 - 5.1 mmol/L Final    Chloride 09/02/2024 108  97 - 110 mmol/L Final    Carbon Dioxide 09/02/2024 17 (L)  21 - 32 mmol/L Final    Anion Gap 09/02/2024 21 (H)  7 - 19 mmol/L Final    Glucose 09/02/2024 192 (H)  70 - 99 mg/dL Final    BUN 09/02/2024 24 (H)  6 - 20 mg/dL Final    Creatinine 09/02/2024 1.17  0.67 - 1.17 mg/dL Final    Glomerular Filtration Rate 09/02/2024 61  >=60 Final    eGFR results = or >60 mL/min/1.73m2 = Normal kidney function. Estimated GFR calculated using the CKD-EPI-R (2021) equation that does not include race in the creatinine calculation.    BUN/Cr 09/02/2024 21  7 - 25 Final    Calcium 09/02/2024 8.9  8.4 - 10.2 mg/dL Final    Bilirubin, Total 09/02/2024 0.8  0.2 - 1.0 mg/dL Final    GOT/AST 09/02/2024 267 (H)  <=37 Units/L Final    GPT/ALT 09/02/2024 115 (H)  <64 Units/L Final    Alkaline Phosphatase 09/02/2024 468 (H)  45 - 117 Units/L Final    Albumin 09/02/2024 2.6 (L)  3.6 - 5.1 g/dL Final    Protein, Total 09/02/2024 6.0 (L)  6.4 - 8.2 g/dL Final    Globulin 09/02/2024 3.4  2.0 - 4.0 g/dL Final    A/G Ratio 09/02/2024 0.8 (L)  1.0 - 2.4 Final    WBC 09/02/2024 13.2 (H)  4.2 - 11.0 K/mcL Final    RBC 09/02/2024 3.91 (L)  4.50 - 5.90 mil/mcL Final    HGB 09/02/2024 12.0 (L)  13.0 - 17.0 g/dL Final    HCT 09/02/2024 36.8 (L)  39.0 - 51.0 % Final    MCV 09/02/2024 94.1  78.0 - 100.0 fl Final    MCH 09/02/2024 30.7  26.0 - 34.0 pg Final    MCHC 09/02/2024 32.6  32.0 - 36.5 g/dL Final    RDW-CV 09/02/2024 13.4  11.0 - 15.0 % Final    RDW-SD 09/02/2024 46.0  39.0 - 50.0 fL Final    PLT 09/02/2024 206  140 - 450 K/mcL Final    NRBC 09/02/2024 0  <=0  /100 WBC Final    Neutrophil, Percent 09/02/2024 82  % Final    Lymphocytes, Percent 09/02/2024 10  % Final    Mono, Percent 09/02/2024 6  % Final    Eosinophils, Percent 09/02/2024 0  % Final    Basophils, Percent 09/02/2024 0  % Final    Immature Granulocytes 09/02/2024 2  % Final    Absolute Neutrophils 09/02/2024 10.7 (H)  1.8 - 7.7 K/mcL Final    Absolute Lymphocytes 09/02/2024 1.4  1.0 - 4.0 K/mcL Final    Absolute Monocytes 09/02/2024 0.8  0.3 - 0.9 K/mcL Final    Absolute Eosinophils  09/02/2024 0.0  0.0 - 0.5 K/mcL Final    Absolute Basophils 09/02/2024 0.1  0.0 - 0.3 K/mcL Final    Absolute Immature Granulocytes 09/02/2024 0.2  0.0 - 0.2 K/mcL Final    Extra Tube 09/02/2024 Hold for Add Ons   Final    GLUCOSE, BEDSIDE - POINT OF CARE 09/02/2024 178 (H)  70 - 99 mg/dL Final    GLUCOSE, BEDSIDE - POINT OF CARE 09/03/2024 164 (H)  70 - 99 mg/dL Final    Protime- PT 09/03/2024 13.0 (H)  9.7 - 11.8 sec Final    INR 09/03/2024 1.3    Final    INR Therapeutic Range: 2.0 to 3.0 (2.5 to 3.5 recommended for recurrent thrombotic episodes and mechanical prosthetic heart valves.)    Extra Tube 09/03/2024 Hold for Add Ons   Final    Extra Tube 09/03/2024 Hold for Add Ons   Final    Extra Tube 09/03/2024 Hold for Add Ons   Final    GLUCOSE, BEDSIDE - POINT OF CARE 09/03/2024 167 (H)  70 - 99 mg/dL Final    GLUCOSE, BEDSIDE - POINT OF CARE 09/03/2024 159 (H)  70 - 99 mg/dL Final       Imaging    IR LIVER BIOPSY   Final Result   Technically successful image guided biopsy of left hepatic   lobe liver mass.      PLAN: Please see pathology results for final diagnosis   ___________________________________________________________________________   ______________________________________      PROCEDURES PERFORMED:   Image Guided Access Of left hepatic lobe liver mass   Fine Needle Biopsy   Core Biopsy   Conscious Sedation      ANESTHESIA/SEDATION: Moderate conscious sedation was administered with   continuous vital signs, pulse  oximetry, and ETCO2 monitoring by a   registered nurse who was present in the procedure room.  The sedation   process was conducted under the direct supervision of the physician   performing the procedure.       Sedation start at: 1507       Sedation end at: 1519      PROPHYLACTIC ANTIBIOTIC: None.      PREPARATION: The site was prepared and draped and all elements of maximal   sterile barrier technique including sterile gloves, sterile gown, mask,   large sterile sheet, hand hygiene and cutaneous antisepsis with 2%   chlorhexidine +70% isopropyl alcohol were used. Discussion of Risks,   Benefits and Alternatives completed with patient/authorized decision maker.   Additionally, potential problems related to recuperation, likelihood of   achieving care, treatment and service goals were discussed. Relevant risks,   benefits and side effects related to alternatives, including the possible   results of not receiving care, treatment and services discussed. When   indicated, any limitations on the confidentiality of information learned   from or about the patient were explained. All patient/authorized decision   maker questions answered and consent for procedure was provided. The   patient's identification, correct procedure and position were verified. The   appropriate site was verified and marked as appropriate. Equipment/Implants   were checked for functionality and availability.      PROCEDURE/FINDINGS   IMAGE GUIDED ACCESS: A needle was advanced into the lesion using Ultrasound   guidance after infiltration of the skin and deep tissues with local   anesthetic. Image demonstrated the tip of the needle within the target   area. An image was sent to the PACS for documentation purposes.      FINE-NEEDLE BIOPSY: Fine-needle biopsy of the area of interest was   performed. The sample was given to the pathologist, who was present in the   procedure room confirming adequacy of the specimen.        Guiding Needle: 17 gauge         Needle: 22-gauge Franseen.        Number of samples: One      After evaluation of the fine needle sample, the pathologist deemed the fine   needle specimen inadequate for diagnosis and requested core biopsies for   complete and further evaluation.       CORE BIOPSY: Then, core biopsies of the lesion of interest was performed   and given to the pathologist present in the room.        Needle: 18-gauge Biopince        Number of samples: 3        Additional description of procedure: None      The needle was removed, manual compression was held for hemostasis, and a   sterile dressing was applied.      COMPLICATIONS: None      IMPLANTS: None      ESTIMATED BLOOD LOSS: Minimal      Electronically Signed by: DUSTIN LIU MD    Signed on: 9/3/2024 6:00 PM    Workstation ID: [id number]      CT ABDOMEN PELVIS W CONTRAST   Final Result   Extensive metastatic disease in the liver. Constipation. No   bowel obstruction. No free fluid no free air and no abscess. No definite   abnormality in the pubic region to correlate with the region of pain.                     Electronically Signed by: AKASH NG MD    Signed on: 9/1/2024 4:12 PM    Workstation ID: [id number]      MRI BRAIN W WO CONTRAST   Final Result   A 1.1 cm ring-enhancing mass lesion in the inferior medial   aspect of the right cerebellum and a 4 mm ring-enhancing lesion in the   right occipital lobe are suggestive of secondary neoplasm. No evidence of   acute intracranial hemorrhage or acute stroke.         Electronically Signed by: ARLETTE PITTS MD    Signed on: 9/1/2024 2:35 PM    Workstation ID: [id number]      XR CHEST AP OR PA   Final Result   Left upper lobe mass is strongly suspected. No focal   infiltrate.         Electronically Signed by: AKASH NG MD    Signed on: 8/30/2024 3:34 PM    Workstation ID: [id number]          Microbiology Results       None            Assessment and Plan    Assessment/Plan:  Carcinoma of the lung with  metastasis  Biopsy was done today  Metastatic to liver , brain  Hypertension  Hyperlipidemia  Diabetes mellitus type 2  Atrial fibrillation  COPD  Obstructive sleep apnea  Weight loss  Severe protein calorie malnutrition   Acute metabolic encephalopathy  Constipation.   Dysurea  better  Continue medication   Acute renal failure improved  Continue IV fluids encourage oral oral intake  Follow labs  Discussed with family    Code Status    Code Status: Full Resuscitation      [This note used Dragon technology. Transcription errors are not uncommon and may not have been corrected prior to electronically signing the note. Should you find these errors, please consult the clinician for interpretation (or apply common sense adjustment when safe and appropriate).]    Yovanny Jones MD  9/3/2024 7:16 PM

## 2025-03-03 NOTE — PROGRESS NOTES
Pt. Asks to have bipap replaced with his cannula, stating that he cannot sleep and wants to watch tv.   Placed on 4lpm HFNC 
 patient hesitant to participate/unable to perform